# Patient Record
Sex: MALE | Race: WHITE | Employment: OTHER | URBAN - METROPOLITAN AREA
[De-identification: names, ages, dates, MRNs, and addresses within clinical notes are randomized per-mention and may not be internally consistent; named-entity substitution may affect disease eponyms.]

---

## 2018-10-10 ENCOUNTER — APPOINTMENT (OUTPATIENT)
Dept: GENERAL RADIOLOGY | Age: 58
End: 2018-10-10
Payer: COMMERCIAL

## 2018-10-10 ENCOUNTER — HOSPITAL ENCOUNTER (OUTPATIENT)
Age: 58
Setting detail: OBSERVATION
Discharge: HOME | End: 2018-10-11
Attending: EMERGENCY MEDICINE | Admitting: INTERNAL MEDICINE
Payer: COMMERCIAL

## 2018-10-10 DIAGNOSIS — R07.9 CHEST PAIN, UNSPECIFIED TYPE: Primary | ICD-10-CM

## 2018-10-10 DIAGNOSIS — I10 ESSENTIAL HYPERTENSION: ICD-10-CM

## 2018-10-10 DIAGNOSIS — E78.5 HYPERLIPIDEMIA, UNSPECIFIED HYPERLIPIDEMIA TYPE: ICD-10-CM

## 2018-10-10 LAB
ALBUMIN SERPL-MCNC: 3.8 G/DL (ref 3.5–5.2)
ALP BLD-CCNC: 73 U/L (ref 40–129)
ALT SERPL-CCNC: 33 U/L (ref 0–40)
ANION GAP SERPL CALCULATED.3IONS-SCNC: 9 MMOL/L (ref 7–16)
AST SERPL-CCNC: 26 U/L (ref 0–39)
BASOPHILS ABSOLUTE: 0.01 E9/L (ref 0–0.2)
BASOPHILS RELATIVE PERCENT: 0.2 % (ref 0–2)
BILIRUB SERPL-MCNC: 0.5 MG/DL (ref 0–1.2)
BUN BLDV-MCNC: 15 MG/DL (ref 8–23)
CALCIUM SERPL-MCNC: 8.6 MG/DL (ref 8.6–10.2)
CHLORIDE BLD-SCNC: 98 MMOL/L (ref 98–107)
CO2: 27 MMOL/L (ref 22–29)
CREAT SERPL-MCNC: 0.8 MG/DL (ref 0.7–1.2)
EOSINOPHILS ABSOLUTE: 0.2 E9/L (ref 0.05–0.5)
EOSINOPHILS RELATIVE PERCENT: 3.6 % (ref 0–6)
GFR AFRICAN AMERICAN: >60
GFR NON-AFRICAN AMERICAN: >60 ML/MIN/1.73
GLUCOSE BLD-MCNC: 115 MG/DL (ref 74–109)
HCT VFR BLD CALC: 36.8 % (ref 37–54)
HEMOGLOBIN: 12.4 G/DL (ref 12.5–16.5)
IMMATURE GRANULOCYTES #: 0.03 E9/L
IMMATURE GRANULOCYTES %: 0.5 % (ref 0–5)
LYMPHOCYTES ABSOLUTE: 1.17 E9/L (ref 1.5–4)
LYMPHOCYTES RELATIVE PERCENT: 21.3 % (ref 20–42)
MCH RBC QN AUTO: 29.5 PG (ref 26–35)
MCHC RBC AUTO-ENTMCNC: 33.7 % (ref 32–34.5)
MCV RBC AUTO: 87.4 FL (ref 80–99.9)
MONOCYTES ABSOLUTE: 0.61 E9/L (ref 0.1–0.95)
MONOCYTES RELATIVE PERCENT: 11.1 % (ref 2–12)
NEUTROPHILS ABSOLUTE: 3.48 E9/L (ref 1.8–7.3)
NEUTROPHILS RELATIVE PERCENT: 63.3 % (ref 43–80)
PDW BLD-RTO: 13.8 FL (ref 11.5–15)
PLATELET # BLD: 180 E9/L (ref 130–450)
PMV BLD AUTO: 10 FL (ref 7–12)
POTASSIUM SERPL-SCNC: 4.3 MMOL/L (ref 3.5–5)
PRO-BNP: 127 PG/ML (ref 0–125)
RBC # BLD: 4.21 E12/L (ref 3.8–5.8)
SODIUM BLD-SCNC: 134 MMOL/L (ref 132–146)
TOTAL PROTEIN: 7.1 G/DL (ref 6.4–8.3)
TROPONIN: <0.01 NG/ML (ref 0–0.03)
TROPONIN: <0.01 NG/ML (ref 0–0.03)
WBC # BLD: 5.5 E9/L (ref 4.5–11.5)

## 2018-10-10 PROCEDURE — 36415 COLL VENOUS BLD VENIPUNCTURE: CPT

## 2018-10-10 PROCEDURE — 71045 X-RAY EXAM CHEST 1 VIEW: CPT

## 2018-10-10 PROCEDURE — G0378 HOSPITAL OBSERVATION PER HR: HCPCS

## 2018-10-10 PROCEDURE — 99285 EMERGENCY DEPT VISIT HI MDM: CPT

## 2018-10-10 PROCEDURE — 85025 COMPLETE CBC W/AUTO DIFF WBC: CPT

## 2018-10-10 PROCEDURE — 83880 ASSAY OF NATRIURETIC PEPTIDE: CPT

## 2018-10-10 PROCEDURE — 6370000000 HC RX 637 (ALT 250 FOR IP): Performed by: EMERGENCY MEDICINE

## 2018-10-10 PROCEDURE — 6370000000 HC RX 637 (ALT 250 FOR IP): Performed by: INTERNAL MEDICINE

## 2018-10-10 PROCEDURE — 80053 COMPREHEN METABOLIC PANEL: CPT

## 2018-10-10 PROCEDURE — 93005 ELECTROCARDIOGRAM TRACING: CPT | Performed by: PHYSICIAN ASSISTANT

## 2018-10-10 PROCEDURE — 94664 DEMO&/EVAL PT USE INHALER: CPT

## 2018-10-10 PROCEDURE — 84484 ASSAY OF TROPONIN QUANT: CPT

## 2018-10-10 RX ORDER — ACETAMINOPHEN 325 MG/1
650 TABLET ORAL EVERY 4 HOURS PRN
Status: DISCONTINUED | OUTPATIENT
Start: 2018-10-10 | End: 2018-10-11 | Stop reason: HOSPADM

## 2018-10-10 RX ORDER — NITROGLYCERIN 0.4 MG/1
0.4 TABLET SUBLINGUAL EVERY 5 MIN PRN
Status: DISCONTINUED | OUTPATIENT
Start: 2018-10-10 | End: 2018-10-11 | Stop reason: HOSPADM

## 2018-10-10 RX ORDER — AMLODIPINE BESYLATE 5 MG/1
5 TABLET ORAL DAILY
Status: DISCONTINUED | OUTPATIENT
Start: 2018-10-11 | End: 2018-10-11 | Stop reason: HOSPADM

## 2018-10-10 RX ORDER — PRAVASTATIN SODIUM 20 MG
40 TABLET ORAL DAILY
Status: DISCONTINUED | OUTPATIENT
Start: 2018-10-11 | End: 2018-10-11 | Stop reason: HOSPADM

## 2018-10-10 RX ORDER — ONDANSETRON 2 MG/ML
4 INJECTION INTRAMUSCULAR; INTRAVENOUS EVERY 6 HOURS PRN
Status: DISCONTINUED | OUTPATIENT
Start: 2018-10-10 | End: 2018-10-11 | Stop reason: HOSPADM

## 2018-10-10 RX ORDER — ASPIRIN 81 MG/1
324 TABLET, CHEWABLE ORAL ONCE
Status: COMPLETED | OUTPATIENT
Start: 2018-10-10 | End: 2018-10-10

## 2018-10-10 RX ORDER — ASPIRIN 81 MG/1
81 TABLET, CHEWABLE ORAL DAILY
Status: DISCONTINUED | OUTPATIENT
Start: 2018-10-11 | End: 2018-10-11 | Stop reason: HOSPADM

## 2018-10-10 RX ORDER — IPRATROPIUM BROMIDE AND ALBUTEROL SULFATE 2.5; .5 MG/3ML; MG/3ML
1 SOLUTION RESPIRATORY (INHALATION) ONCE
Status: COMPLETED | OUTPATIENT
Start: 2018-10-10 | End: 2018-10-10

## 2018-10-10 RX ADMIN — IPRATROPIUM BROMIDE AND ALBUTEROL SULFATE 1 AMPULE: .5; 3 SOLUTION RESPIRATORY (INHALATION) at 17:59

## 2018-10-10 RX ADMIN — ASPIRIN 324 MG: 81 TABLET, CHEWABLE ORAL at 18:56

## 2018-10-10 RX ADMIN — ACETAMINOPHEN 650 MG: 325 TABLET ORAL at 23:10

## 2018-10-10 RX ADMIN — NITROGLYCERIN 0.4 MG: 0.4 TABLET SUBLINGUAL at 19:16

## 2018-10-10 RX ADMIN — NITROGLYCERIN 0.4 MG: 0.4 TABLET SUBLINGUAL at 18:58

## 2018-10-10 ASSESSMENT — ENCOUNTER SYMPTOMS
SHORTNESS OF BREATH: 1
EYE REDNESS: 0
VOMITING: 0
EYE DISCHARGE: 0
EYE PAIN: 0
BACK PAIN: 0
NAUSEA: 0
WHEEZING: 0
ABDOMINAL PAIN: 0
SORE THROAT: 0
COUGH: 0
SINUS PRESSURE: 0
DIARRHEA: 0

## 2018-10-10 ASSESSMENT — PAIN SCALES - GENERAL
PAINLEVEL_OUTOF10: 2
PAINLEVEL_OUTOF10: 2
PAINLEVEL_OUTOF10: 7
PAINLEVEL_OUTOF10: 0

## 2018-10-10 ASSESSMENT — PAIN DESCRIPTION - FREQUENCY: FREQUENCY: CONTINUOUS

## 2018-10-10 ASSESSMENT — PAIN DESCRIPTION - ORIENTATION
ORIENTATION: LEFT
ORIENTATION: LEFT

## 2018-10-10 ASSESSMENT — PAIN DESCRIPTION - LOCATION
LOCATION: CHEST
LOCATION: CHEST

## 2018-10-10 ASSESSMENT — PAIN DESCRIPTION - ONSET: ONSET: ON-GOING

## 2018-10-10 ASSESSMENT — PAIN DESCRIPTION - PROGRESSION: CLINICAL_PROGRESSION: NOT CHANGED

## 2018-10-10 ASSESSMENT — PAIN DESCRIPTION - DESCRIPTORS
DESCRIPTORS: HEAVINESS
DESCRIPTORS: HEAVINESS

## 2018-10-10 ASSESSMENT — PAIN DESCRIPTION - PAIN TYPE
TYPE: ACUTE PAIN
TYPE: ACUTE PAIN

## 2018-10-11 ENCOUNTER — APPOINTMENT (OUTPATIENT)
Dept: NUCLEAR MEDICINE | Age: 58
End: 2018-10-11
Payer: COMMERCIAL

## 2018-10-11 VITALS
BODY MASS INDEX: 31.6 KG/M2 | DIASTOLIC BLOOD PRESSURE: 67 MMHG | OXYGEN SATURATION: 93 % | WEIGHT: 196.6 LBS | RESPIRATION RATE: 18 BRPM | HEIGHT: 66 IN | SYSTOLIC BLOOD PRESSURE: 130 MMHG | HEART RATE: 76 BPM | TEMPERATURE: 98.3 F

## 2018-10-11 LAB
ALBUMIN SERPL-MCNC: 3.6 G/DL (ref 3.5–5.2)
ALP BLD-CCNC: 67 U/L (ref 40–129)
ALT SERPL-CCNC: 30 U/L (ref 0–40)
ANION GAP SERPL CALCULATED.3IONS-SCNC: 9 MMOL/L (ref 7–16)
AST SERPL-CCNC: 20 U/L (ref 0–39)
BASOPHILS ABSOLUTE: 0.02 E9/L (ref 0–0.2)
BASOPHILS RELATIVE PERCENT: 0.4 % (ref 0–2)
BILIRUB SERPL-MCNC: 0.4 MG/DL (ref 0–1.2)
BUN BLDV-MCNC: 15 MG/DL (ref 8–23)
CALCIUM SERPL-MCNC: 8.5 MG/DL (ref 8.6–10.2)
CHLORIDE BLD-SCNC: 100 MMOL/L (ref 98–107)
CHOLESTEROL, TOTAL: 145 MG/DL (ref 0–199)
CO2: 25 MMOL/L (ref 22–29)
CREAT SERPL-MCNC: 0.8 MG/DL (ref 0.7–1.2)
EOSINOPHILS ABSOLUTE: 0.21 E9/L (ref 0.05–0.5)
EOSINOPHILS RELATIVE PERCENT: 4.6 % (ref 0–6)
GFR AFRICAN AMERICAN: >60
GFR NON-AFRICAN AMERICAN: >60 ML/MIN/1.73
GLUCOSE BLD-MCNC: 120 MG/DL (ref 74–109)
HCT VFR BLD CALC: 36.2 % (ref 37–54)
HDLC SERPL-MCNC: 23 MG/DL
HEMOGLOBIN: 12.8 G/DL (ref 12.5–16.5)
IMMATURE GRANULOCYTES #: 0.01 E9/L
IMMATURE GRANULOCYTES %: 0.2 % (ref 0–5)
LDL CHOLESTEROL CALCULATED: 90 MG/DL (ref 0–99)
LV EF: 58 %
LVEF MODALITY: NORMAL
LYMPHOCYTES ABSOLUTE: 1.02 E9/L (ref 1.5–4)
LYMPHOCYTES RELATIVE PERCENT: 22.1 % (ref 20–42)
MCH RBC QN AUTO: 30.8 PG (ref 26–35)
MCHC RBC AUTO-ENTMCNC: 35.4 % (ref 32–34.5)
MCV RBC AUTO: 87.2 FL (ref 80–99.9)
MONOCYTES ABSOLUTE: 0.68 E9/L (ref 0.1–0.95)
MONOCYTES RELATIVE PERCENT: 14.8 % (ref 2–12)
NEUTROPHILS ABSOLUTE: 2.67 E9/L (ref 1.8–7.3)
NEUTROPHILS RELATIVE PERCENT: 57.9 % (ref 43–80)
PDW BLD-RTO: 13.7 FL (ref 11.5–15)
PLATELET # BLD: 170 E9/L (ref 130–450)
PMV BLD AUTO: 10.1 FL (ref 7–12)
POTASSIUM REFLEX MAGNESIUM: 4.1 MMOL/L (ref 3.5–5)
RBC # BLD: 4.15 E12/L (ref 3.8–5.8)
SODIUM BLD-SCNC: 134 MMOL/L (ref 132–146)
TOTAL PROTEIN: 6.4 G/DL (ref 6.4–8.3)
TRIGL SERPL-MCNC: 161 MG/DL (ref 0–149)
TROPONIN: <0.01 NG/ML (ref 0–0.03)
VLDLC SERPL CALC-MCNC: 32 MG/DL
WBC # BLD: 4.6 E9/L (ref 4.5–11.5)

## 2018-10-11 PROCEDURE — 6370000000 HC RX 637 (ALT 250 FOR IP): Performed by: INTERNAL MEDICINE

## 2018-10-11 PROCEDURE — 36415 COLL VENOUS BLD VENIPUNCTURE: CPT

## 2018-10-11 PROCEDURE — 93018 CV STRESS TEST I&R ONLY: CPT | Performed by: INTERNAL MEDICINE

## 2018-10-11 PROCEDURE — 84484 ASSAY OF TROPONIN QUANT: CPT

## 2018-10-11 PROCEDURE — 6360000002 HC RX W HCPCS: Performed by: INTERNAL MEDICINE

## 2018-10-11 PROCEDURE — 93016 CV STRESS TEST SUPVJ ONLY: CPT | Performed by: INTERNAL MEDICINE

## 2018-10-11 PROCEDURE — A9500 TC99M SESTAMIBI: HCPCS | Performed by: RADIOLOGY

## 2018-10-11 PROCEDURE — 80053 COMPREHEN METABOLIC PANEL: CPT

## 2018-10-11 PROCEDURE — G0378 HOSPITAL OBSERVATION PER HR: HCPCS

## 2018-10-11 PROCEDURE — 85025 COMPLETE CBC W/AUTO DIFF WBC: CPT

## 2018-10-11 PROCEDURE — 93017 CV STRESS TEST TRACING ONLY: CPT

## 2018-10-11 PROCEDURE — 80061 LIPID PANEL: CPT

## 2018-10-11 PROCEDURE — 3430000000 HC RX DIAGNOSTIC RADIOPHARMACEUTICAL: Performed by: RADIOLOGY

## 2018-10-11 PROCEDURE — 78452 HT MUSCLE IMAGE SPECT MULT: CPT

## 2018-10-11 RX ORDER — ALBUTEROL SULFATE 90 UG/1
2 AEROSOL, METERED RESPIRATORY (INHALATION) EVERY 6 HOURS PRN
Qty: 1 INHALER | Refills: 0 | Status: SHIPPED | OUTPATIENT
Start: 2018-10-11 | End: 2019-02-21 | Stop reason: ALTCHOICE

## 2018-10-11 RX ORDER — PREDNISONE 10 MG/1
TABLET ORAL
Qty: 30 TABLET | Refills: 0 | Status: SHIPPED | OUTPATIENT
Start: 2018-10-11 | End: 2018-10-21

## 2018-10-11 RX ADMIN — REGADENOSON 0.4 MG: 0.08 INJECTION, SOLUTION INTRAVENOUS at 11:12

## 2018-10-11 RX ADMIN — Medication 30 MILLICURIE: at 09:30

## 2018-10-11 RX ADMIN — Medication 10 MILLICURIE: at 09:30

## 2018-10-11 RX ADMIN — ASPIRIN 81 MG: 81 TABLET, CHEWABLE ORAL at 12:43

## 2018-10-11 RX ADMIN — AMLODIPINE BESYLATE 5 MG: 5 TABLET ORAL at 12:44

## 2018-10-11 RX ADMIN — PRAVASTATIN SODIUM 40 MG: 20 TABLET ORAL at 12:43

## 2018-10-11 ASSESSMENT — PAIN SCALES - GENERAL: PAINLEVEL_OUTOF10: 0

## 2018-10-11 NOTE — H&P
10/11/2018     10/11/2018    CREATININE 0.8 10/11/2018    BUN 15 10/11/2018    CO2 25 10/11/2018    GLUCOSE 120 (H) 10/11/2018    ALT 30 10/11/2018    AST 20 10/11/2018    INR 1.0 06/04/2012     Lab Results   Component Value Date    TROPONINI <0.01 10/11/2018       Recent Radiological Studies:  Stress/Rest NM Myocardial SPECT Exercise   Final Result   1. No reversible perfusion defect   2. Ejection fraction is 58 %. 3. No significant wall motion abnormality         XR CHEST PORTABLE   Final Result   Subtle prominence of the pulmonary vasculature for peribronchial   interstitial density, but no evidence of consolidative pneumonia,   dependent effusion, or cardiomegaly. Assessment  Active Hospital Problems    Diagnosis    Chest pain [R07.9]     Priority: High       Patient Active Problem List    Diagnosis Date Noted    Chest pain 10/10/2018     Priority: High    Hypertension     Hyperlipidemia        Plan  Chest pain likely related to pulmonary dz: Observation on telemetry floor, Exercise stress test, Cycle CE's, ASA, lipid panel. He would benefit from PFT's as an outpatient. Albuterol puffer and steroids on DC. · Continue home medications. · Follow labs  · DVT prophylaxis. · Please see orders for further management and care. ·  for discharge planning  · Discharge plan: Home today-- needs pulm f/u    Brittany Gardner NP  10/11/2018  12:29 PM    Addendum: I have personally participated in a face-to-face history and physical exam on the date of service with the patient. I have discussed the case with the NP. I also participated in medical decision making with the NPon the date of service and I agree with all of the pertinent clinical information unless indicated in my editing of the note. I have reviewed and edited the note above based on my findings during my history, exam, and decision making on the same day of service.      Electronically signed by Reji Avila DO on 10/11/2018

## 2018-10-11 NOTE — PROGRESS NOTES
P Quality Flow/Interdisciplinary Rounds Progress Note        Quality Flow Rounds held on October 11, 2018    Disciplines Attending:  Bedside Nurse, ,  and Nursing Unit Leadership    Boaz Mckeon was admitted on 10/10/2018  4:23 PM    Anticipated Discharge Date:  Expected Discharge Date: 10/11/18    Disposition:    Jaren Score:  Jaren Scale Score: 23    Readmission Risk              Risk of Unplanned Readmission:        9             Discussed patient goal for the day, patient clinical progression, and barriers to discharge.   The following Goal(s) of the Day/Commitment(s) have been identified:  Discharge if stress negative      Mayuri Nab  October 11, 2018

## 2018-10-12 LAB
EKG ATRIAL RATE: 81 BPM
EKG P AXIS: 32 DEGREES
EKG P-R INTERVAL: 162 MS
EKG Q-T INTERVAL: 370 MS
EKG QRS DURATION: 84 MS
EKG QTC CALCULATION (BAZETT): 429 MS
EKG R AXIS: -7 DEGREES
EKG T AXIS: 51 DEGREES
EKG VENTRICULAR RATE: 81 BPM

## 2018-11-03 LAB
CHOLESTEROL, TOTAL: 196 MG/DL
CHOLESTEROL/HDL RATIO: 4.8
HDLC SERPL-MCNC: 41 MG/DL (ref 35–70)
LDL CHOLESTEROL CALCULATED: 134 MG/DL (ref 0–160)
TRIGL SERPL-MCNC: 99 MG/DL
VLDLC SERPL CALC-MCNC: NORMAL MG/DL

## 2019-02-21 ENCOUNTER — HOSPITAL ENCOUNTER (EMERGENCY)
Age: 59
Discharge: HOME | End: 2019-02-21
Attending: FAMILY MEDICINE
Payer: COMMERCIAL

## 2019-02-21 VITALS
BODY MASS INDEX: 32.14 KG/M2 | HEART RATE: 84 BPM | HEIGHT: 66 IN | RESPIRATION RATE: 16 BRPM | DIASTOLIC BLOOD PRESSURE: 88 MMHG | WEIGHT: 200 LBS | OXYGEN SATURATION: 96 % | TEMPERATURE: 97.9 F | SYSTOLIC BLOOD PRESSURE: 144 MMHG

## 2019-02-21 DIAGNOSIS — T15.02XA FOREIGN BODY OF LEFT CORNEA, INITIAL ENCOUNTER: Primary | ICD-10-CM

## 2019-02-21 PROCEDURE — 6370000000 HC RX 637 (ALT 250 FOR IP)

## 2019-02-21 PROCEDURE — 99283 EMERGENCY DEPT VISIT LOW MDM: CPT

## 2019-02-21 RX ORDER — TETRACAINE HYDROCHLORIDE 5 MG/ML
SOLUTION OPHTHALMIC
Status: COMPLETED
Start: 2019-02-21 | End: 2019-02-21

## 2019-02-21 RX ADMIN — FLUORESCEIN SODIUM 1 MG: 1 STRIP OPHTHALMIC at 08:30

## 2019-02-21 RX ADMIN — TETRACAINE HYDROCHLORIDE 1 DROP: 5 SOLUTION OPHTHALMIC at 08:29

## 2019-02-21 ASSESSMENT — PAIN SCALES - GENERAL: PAINLEVEL_OUTOF10: 7

## 2019-02-21 ASSESSMENT — PAIN DESCRIPTION - LOCATION: LOCATION: EYE

## 2019-02-21 ASSESSMENT — PAIN DESCRIPTION - FREQUENCY: FREQUENCY: CONTINUOUS

## 2019-02-21 ASSESSMENT — PAIN DESCRIPTION - DESCRIPTORS: DESCRIPTORS: BURNING;SHARP

## 2019-02-21 ASSESSMENT — VISUAL ACUITY
OS: 20/50
OD: 20/20
OU: 20/20

## 2019-02-21 ASSESSMENT — PAIN DESCRIPTION - ORIENTATION: ORIENTATION: LEFT

## 2019-02-21 ASSESSMENT — PAIN - FUNCTIONAL ASSESSMENT: PAIN_FUNCTIONAL_ASSESSMENT: PREVENTS OR INTERFERES SOME ACTIVE ACTIVITIES AND ADLS

## 2019-02-21 ASSESSMENT — PAIN DESCRIPTION - PAIN TYPE: TYPE: ACUTE PAIN

## 2019-03-29 VITALS
BODY MASS INDEX: 35.32 KG/M2 | SYSTOLIC BLOOD PRESSURE: 142 MMHG | WEIGHT: 212 LBS | DIASTOLIC BLOOD PRESSURE: 82 MMHG | HEART RATE: 90 BPM | HEIGHT: 65 IN | TEMPERATURE: 98.4 F

## 2019-03-29 RX ORDER — SILDENAFIL CITRATE 20 MG/1
TABLET ORAL
COMMUNITY

## 2019-05-08 ENCOUNTER — OFFICE VISIT (OUTPATIENT)
Dept: PRIMARY CARE CLINIC | Age: 59
End: 2019-05-08
Payer: COMMERCIAL

## 2019-05-08 VITALS
HEART RATE: 85 BPM | OXYGEN SATURATION: 95 % | BODY MASS INDEX: 33.27 KG/M2 | TEMPERATURE: 97.7 F | WEIGHT: 207 LBS | DIASTOLIC BLOOD PRESSURE: 80 MMHG | SYSTOLIC BLOOD PRESSURE: 144 MMHG | HEIGHT: 66 IN

## 2019-05-08 DIAGNOSIS — R73.01 IMPAIRED FASTING GLUCOSE: ICD-10-CM

## 2019-05-08 DIAGNOSIS — I10 ESSENTIAL HYPERTENSION: Primary | ICD-10-CM

## 2019-05-08 DIAGNOSIS — F17.210 CIGARETTE NICOTINE DEPENDENCE WITHOUT COMPLICATION: ICD-10-CM

## 2019-05-08 DIAGNOSIS — E78.5 HYPERLIPIDEMIA, UNSPECIFIED HYPERLIPIDEMIA TYPE: ICD-10-CM

## 2019-05-08 DIAGNOSIS — L40.9 PSORIASIS: ICD-10-CM

## 2019-05-08 PROCEDURE — 99214 OFFICE O/P EST MOD 30 MIN: CPT | Performed by: FAMILY MEDICINE

## 2019-05-08 RX ORDER — AMLODIPINE BESYLATE 5 MG/1
5 TABLET ORAL DAILY
Qty: 90 TABLET | Refills: 3 | Status: SHIPPED | OUTPATIENT
Start: 2019-05-08 | End: 2019-11-20 | Stop reason: SDUPTHER

## 2019-05-08 RX ORDER — PRAVASTATIN SODIUM 40 MG
40 TABLET ORAL DAILY
Qty: 90 TABLET | Refills: 3 | Status: SHIPPED | OUTPATIENT
Start: 2019-05-08 | End: 2019-11-20 | Stop reason: SDUPTHER

## 2019-05-08 ASSESSMENT — ENCOUNTER SYMPTOMS
RESPIRATORY NEGATIVE: 1
EYES NEGATIVE: 1
ALLERGIC/IMMUNOLOGIC NEGATIVE: 1
GASTROINTESTINAL NEGATIVE: 1

## 2019-05-08 ASSESSMENT — PATIENT HEALTH QUESTIONNAIRE - PHQ9
1. LITTLE INTEREST OR PLEASURE IN DOING THINGS: 0
SUM OF ALL RESPONSES TO PHQ9 QUESTIONS 1 & 2: 0
2. FEELING DOWN, DEPRESSED OR HOPELESS: 0
SUM OF ALL RESPONSES TO PHQ QUESTIONS 1-9: 0
SUM OF ALL RESPONSES TO PHQ QUESTIONS 1-9: 0

## 2019-05-08 NOTE — PROGRESS NOTES
19     Mg Jaffe    : 1960 Sex: male   Age: 59 y.o. Chief Complaint   Patient presents with    Hypertension     No new concerns    Hyperlipidemia    Nicotine Dependence     He did not have CT scan done. HPI: Patient presents today and medical follow-up issues of hypertension hyperlipidemia nicotine dependence psoriasis. Medically doing well with current medications. Repeat pressure per myself 140/80 today. Psoriasis managed by Dr. Grimaldo Grandchild. Currently on Humira. Doing well. Scheduled for screening CT scan of the chest given nicotine history. Currently understands risks and unwilling to have tested this time. ROS:  Review of Systems   Constitutional: Negative. HENT: Negative. Eyes: Negative. Respiratory: Negative. Gastrointestinal: Negative. Endocrine: Negative. Genitourinary: Negative. Musculoskeletal: Negative. Skin: Negative. Allergic/Immunologic: Negative. Neurological: Negative. Hematological: Negative. Psychiatric/Behavioral: Negative.            Current Outpatient Medications:     sildenafil (REVATIO) 20 MG tablet, 2-3 tabs as needed, Disp: , Rfl:     pravastatin (PRAVACHOL) 40 MG tablet, Take 40 mg by mouth daily, Disp: , Rfl:     amLODIPine (NORVASC) 5 MG tablet, Take 5 mg by mouth daily Instructed to take am of procedure, Disp: , Rfl:     No Known Allergies    Social History     Tobacco Use    Smoking status: Heavy Tobacco Smoker     Packs/day: 1.50     Years: 30.00     Pack years: 45.00     Types: Cigarettes    Smokeless tobacco: Never Used   Substance Use Topics    Alcohol use: Yes     Comment: beer     Drug use: No      Past Surgical History:   Procedure Laterality Date    COLONOSCOPY  2016    JOINT REPLACEMENT Left     knee    KNEE ARTHROPLASTY       Family History   Problem Relation Age of Onset    Heart Disease Mother     Cancer Mother         Brain    Cancer Father         colon, Pancreatic    Heart Attack Father     Diabetes Father      Past Medical History:   Diagnosis Date    Abscess     Hyperlipidemia     Hypertension     Impaired fasting glucose     Psoriasis        Vitals:    05/08/19 1601 05/08/19 1607   BP: (!) 158/84 (!) 144/80   Pulse: 85    Temp: 97.7 °F (36.5 °C)    SpO2: 95%    Weight: 207 lb (93.9 kg)    Height: 5' 6\" (1.676 m)        Physical Exam   Constitutional: He is oriented to person, place, and time. He appears well-developed and well-nourished. HENT:   Head: Normocephalic. Right Ear: External ear normal.   Left Ear: External ear normal.   Nose: Nose normal.   Mouth/Throat: Oropharynx is clear and moist.   Eyes: Pupils are equal, round, and reactive to light. Conjunctivae and EOM are normal.   Neck: Normal range of motion. Neck supple. Cardiovascular: Normal rate and intact distal pulses. Pulmonary/Chest: Breath sounds normal.   Abdominal: Soft. Bowel sounds are normal.   Musculoskeletal: Normal range of motion. Neurological: He is alert and oriented to person, place, and time. Skin: Skin is warm and dry. Psychiatric: He has a normal mood and affect. His behavior is normal.   Nursing note and vitals reviewed. Medically all stable as noted. We'll continue with current medications. Recommended labs just prior to next visit including glycohemoglobin    Plan Per Assessment:  Shante Rodriguez was seen today for hypertension, hyperlipidemia and nicotine dependence. Diagnoses and all orders for this visit:    Essential hypertension    Hyperlipidemia, unspecified hyperlipidemia type    Psoriasis    Cigarette nicotine dependence without complication        No follow-ups on file. Avinash Najera DO    Note was generated with the assistance of voice recognition software. Document was reviewed however may contain grammatical errors.

## 2019-07-20 ENCOUNTER — OFFICE VISIT (OUTPATIENT)
Dept: FAMILY MEDICINE CLINIC | Age: 59
End: 2019-07-20
Payer: COMMERCIAL

## 2019-07-20 VITALS
WEIGHT: 201 LBS | HEIGHT: 65 IN | TEMPERATURE: 98 F | BODY MASS INDEX: 33.49 KG/M2 | OXYGEN SATURATION: 96 % | HEART RATE: 87 BPM | SYSTOLIC BLOOD PRESSURE: 138 MMHG | DIASTOLIC BLOOD PRESSURE: 80 MMHG

## 2019-07-20 DIAGNOSIS — L73.9 FOLLICULITIS OF BOTH AXILLAE: Primary | ICD-10-CM

## 2019-07-20 DIAGNOSIS — R73.01 IMPAIRED FASTING GLUCOSE: ICD-10-CM

## 2019-07-20 DIAGNOSIS — I10 ESSENTIAL HYPERTENSION: ICD-10-CM

## 2019-07-20 DIAGNOSIS — E78.5 HYPERLIPIDEMIA, UNSPECIFIED HYPERLIPIDEMIA TYPE: ICD-10-CM

## 2019-07-20 PROCEDURE — 99214 OFFICE O/P EST MOD 30 MIN: CPT | Performed by: FAMILY MEDICINE

## 2019-07-20 RX ORDER — SULFAMETHOXAZOLE AND TRIMETHOPRIM 800; 160 MG/1; MG/1
1 TABLET ORAL 2 TIMES DAILY
Qty: 30 TABLET | Refills: 0 | Status: SHIPPED | OUTPATIENT
Start: 2019-07-20 | End: 2019-08-04

## 2019-07-20 ASSESSMENT — ENCOUNTER SYMPTOMS
EYES NEGATIVE: 1
ALLERGIC/IMMUNOLOGIC NEGATIVE: 1
GASTROINTESTINAL NEGATIVE: 1
RESPIRATORY NEGATIVE: 1

## 2019-08-07 ENCOUNTER — HOSPITAL ENCOUNTER (OUTPATIENT)
Age: 59
Discharge: HOME | End: 2019-08-07
Payer: COMMERCIAL

## 2019-08-07 DIAGNOSIS — I10 ESSENTIAL HYPERTENSION: ICD-10-CM

## 2019-08-07 DIAGNOSIS — E78.5 HYPERLIPIDEMIA, UNSPECIFIED HYPERLIPIDEMIA TYPE: ICD-10-CM

## 2019-08-07 DIAGNOSIS — R73.01 IMPAIRED FASTING GLUCOSE: ICD-10-CM

## 2019-08-07 LAB
ALBUMIN SERPL-MCNC: 4.4 G/DL (ref 3.5–5.2)
ALP BLD-CCNC: 91 U/L (ref 40–129)
ALT SERPL-CCNC: 20 U/L (ref 0–40)
ANION GAP SERPL CALCULATED.3IONS-SCNC: 10 MMOL/L (ref 7–16)
AST SERPL-CCNC: 17 U/L (ref 0–39)
BILIRUB SERPL-MCNC: 0.4 MG/DL (ref 0–1.2)
BUN BLDV-MCNC: 14 MG/DL (ref 8–23)
CALCIUM SERPL-MCNC: 9.3 MG/DL (ref 8.6–10.2)
CHLORIDE BLD-SCNC: 102 MMOL/L (ref 98–107)
CHOLESTEROL, TOTAL: 166 MG/DL (ref 0–199)
CO2: 27 MMOL/L (ref 22–29)
CREAT SERPL-MCNC: 0.8 MG/DL (ref 0.7–1.2)
GFR AFRICAN AMERICAN: >60
GFR NON-AFRICAN AMERICAN: >60 ML/MIN/1.73
GLUCOSE BLD-MCNC: 131 MG/DL (ref 74–99)
HBA1C MFR BLD: 6.1 % (ref 4–5.6)
HDLC SERPL-MCNC: 27 MG/DL
LDL CHOLESTEROL CALCULATED: 118 MG/DL (ref 0–99)
POTASSIUM SERPL-SCNC: 4.1 MMOL/L (ref 3.5–5)
SODIUM BLD-SCNC: 139 MMOL/L (ref 132–146)
TOTAL PROTEIN: 7.7 G/DL (ref 6.4–8.3)
TRIGL SERPL-MCNC: 104 MG/DL (ref 0–149)
VLDLC SERPL CALC-MCNC: 21 MG/DL

## 2019-08-07 PROCEDURE — 83036 HEMOGLOBIN GLYCOSYLATED A1C: CPT

## 2019-08-07 PROCEDURE — 80053 COMPREHEN METABOLIC PANEL: CPT

## 2019-08-07 PROCEDURE — 36415 COLL VENOUS BLD VENIPUNCTURE: CPT

## 2019-08-07 PROCEDURE — 80061 LIPID PANEL: CPT

## 2019-08-15 ENCOUNTER — OFFICE VISIT (OUTPATIENT)
Dept: PRIMARY CARE CLINIC | Age: 59
End: 2019-08-15
Payer: COMMERCIAL

## 2019-08-15 VITALS
TEMPERATURE: 99 F | WEIGHT: 195 LBS | BODY MASS INDEX: 32.45 KG/M2 | SYSTOLIC BLOOD PRESSURE: 142 MMHG | DIASTOLIC BLOOD PRESSURE: 70 MMHG

## 2019-08-15 DIAGNOSIS — I10 ESSENTIAL HYPERTENSION: Primary | ICD-10-CM

## 2019-08-15 DIAGNOSIS — L08.9 SKIN INFECTION: ICD-10-CM

## 2019-08-15 DIAGNOSIS — R73.01 IMPAIRED FASTING GLUCOSE: ICD-10-CM

## 2019-08-15 DIAGNOSIS — L40.9 PSORIASIS: ICD-10-CM

## 2019-08-15 DIAGNOSIS — E78.5 HYPERLIPIDEMIA, UNSPECIFIED HYPERLIPIDEMIA TYPE: ICD-10-CM

## 2019-08-15 PROCEDURE — 99214 OFFICE O/P EST MOD 30 MIN: CPT | Performed by: FAMILY MEDICINE

## 2019-08-15 RX ORDER — METFORMIN HYDROCHLORIDE 500 MG/1
500 TABLET, EXTENDED RELEASE ORAL
Qty: 30 TABLET | Refills: 3 | Status: SHIPPED | OUTPATIENT
Start: 2019-08-15 | End: 2019-11-20 | Stop reason: SDUPTHER

## 2019-08-15 RX ORDER — DOXYCYCLINE HYCLATE 100 MG/1
100 CAPSULE ORAL 2 TIMES DAILY
Qty: 28 CAPSULE | Refills: 0 | Status: SHIPPED | OUTPATIENT
Start: 2019-08-15 | End: 2019-08-29

## 2019-08-15 ASSESSMENT — ENCOUNTER SYMPTOMS
EYES NEGATIVE: 1
ALLERGIC/IMMUNOLOGIC NEGATIVE: 1
RESPIRATORY NEGATIVE: 1
GASTROINTESTINAL NEGATIVE: 1

## 2019-11-14 ENCOUNTER — TELEPHONE (OUTPATIENT)
Dept: PRIMARY CARE CLINIC | Age: 59
End: 2019-11-14

## 2019-11-20 ENCOUNTER — OFFICE VISIT (OUTPATIENT)
Dept: PRIMARY CARE CLINIC | Age: 59
End: 2019-11-20

## 2019-11-20 VITALS
WEIGHT: 196 LBS | OXYGEN SATURATION: 98 % | TEMPERATURE: 97.6 F | HEART RATE: 84 BPM | BODY MASS INDEX: 32.62 KG/M2 | DIASTOLIC BLOOD PRESSURE: 80 MMHG | SYSTOLIC BLOOD PRESSURE: 158 MMHG

## 2019-11-20 DIAGNOSIS — I10 ESSENTIAL HYPERTENSION: Primary | ICD-10-CM

## 2019-11-20 DIAGNOSIS — E78.5 HYPERLIPIDEMIA, UNSPECIFIED HYPERLIPIDEMIA TYPE: ICD-10-CM

## 2019-11-20 DIAGNOSIS — L40.9 PSORIASIS: ICD-10-CM

## 2019-11-20 DIAGNOSIS — F17.210 CIGARETTE NICOTINE DEPENDENCE WITHOUT COMPLICATION: ICD-10-CM

## 2019-11-20 DIAGNOSIS — R73.01 IMPAIRED FASTING GLUCOSE: ICD-10-CM

## 2019-11-20 PROCEDURE — 99214 OFFICE O/P EST MOD 30 MIN: CPT | Performed by: FAMILY MEDICINE

## 2019-11-20 RX ORDER — AMLODIPINE BESYLATE 5 MG/1
5 TABLET ORAL DAILY
Qty: 90 TABLET | Refills: 3 | Status: SHIPPED
Start: 2019-11-20 | End: 2020-12-21 | Stop reason: SDUPTHER

## 2019-11-20 RX ORDER — HYDROCHLOROTHIAZIDE 25 MG/1
25 TABLET ORAL EVERY MORNING
Qty: 30 TABLET | Refills: 2 | Status: SHIPPED
Start: 2019-11-20 | End: 2020-03-23

## 2019-11-20 RX ORDER — METFORMIN HYDROCHLORIDE 500 MG/1
500 TABLET, EXTENDED RELEASE ORAL
Qty: 90 TABLET | Refills: 3 | Status: SHIPPED
Start: 2019-11-20 | End: 2020-12-04

## 2019-11-20 RX ORDER — PRAVASTATIN SODIUM 40 MG
40 TABLET ORAL DAILY
Qty: 90 TABLET | Refills: 3 | Status: SHIPPED
Start: 2019-11-20 | End: 2020-12-21 | Stop reason: SDUPTHER

## 2019-11-20 ASSESSMENT — ENCOUNTER SYMPTOMS
ALLERGIC/IMMUNOLOGIC NEGATIVE: 1
EYES NEGATIVE: 1
RESPIRATORY NEGATIVE: 1
GASTROINTESTINAL NEGATIVE: 1

## 2020-01-19 LAB
ALBUMIN SERPL-MCNC: 4.4 G/DL
ALP BLD-CCNC: 84 U/L
ALT SERPL-CCNC: 14 U/L
ANION GAP SERPL CALCULATED.3IONS-SCNC: NORMAL MMOL/L
AST SERPL-CCNC: 13 U/L
AVERAGE GLUCOSE: NORMAL
BILIRUB SERPL-MCNC: 0.6 MG/DL (ref 0.1–1.4)
BUN BLDV-MCNC: 14 MG/DL
CALCIUM SERPL-MCNC: 9.4 MG/DL
CHLORIDE BLD-SCNC: 104 MMOL/L
CHOLESTEROL, TOTAL: 176 MG/DL
CHOLESTEROL/HDL RATIO: 4.9
CO2: 31 MMOL/L
CREAT SERPL-MCNC: 0.82 MG/DL
GFR CALCULATED: 95
GLUCOSE BLD-MCNC: 121 MG/DL
HBA1C MFR BLD: 6.1 %
HDLC SERPL-MCNC: 36 MG/DL (ref 35–70)
LDL CHOLESTEROL CALCULATED: 122 MG/DL (ref 0–160)
POTASSIUM SERPL-SCNC: 4.2 MMOL/L
SODIUM BLD-SCNC: 141 MMOL/L
TOTAL PROTEIN: 6.8
TRIGL SERPL-MCNC: 79 MG/DL
VLDLC SERPL CALC-MCNC: NORMAL MG/DL

## 2020-02-15 ENCOUNTER — OFFICE VISIT (OUTPATIENT)
Dept: FAMILY MEDICINE CLINIC | Age: 60
End: 2020-02-15
Payer: MEDICAID

## 2020-02-15 VITALS
TEMPERATURE: 97.8 F | SYSTOLIC BLOOD PRESSURE: 146 MMHG | DIASTOLIC BLOOD PRESSURE: 78 MMHG | OXYGEN SATURATION: 97 % | WEIGHT: 202 LBS | RESPIRATION RATE: 18 BRPM | BODY MASS INDEX: 33.61 KG/M2 | HEART RATE: 83 BPM

## 2020-02-15 PROCEDURE — 4004F PT TOBACCO SCREEN RCVD TLK: CPT | Performed by: NURSE PRACTITIONER

## 2020-02-15 PROCEDURE — 3017F COLORECTAL CA SCREEN DOC REV: CPT | Performed by: NURSE PRACTITIONER

## 2020-02-15 PROCEDURE — G8427 DOCREV CUR MEDS BY ELIG CLIN: HCPCS | Performed by: NURSE PRACTITIONER

## 2020-02-15 PROCEDURE — G8417 CALC BMI ABV UP PARAM F/U: HCPCS | Performed by: NURSE PRACTITIONER

## 2020-02-15 PROCEDURE — G8484 FLU IMMUNIZE NO ADMIN: HCPCS | Performed by: NURSE PRACTITIONER

## 2020-02-15 PROCEDURE — 99213 OFFICE O/P EST LOW 20 MIN: CPT | Performed by: NURSE PRACTITIONER

## 2020-02-15 RX ORDER — AZITHROMYCIN 250 MG/1
250 TABLET, FILM COATED ORAL SEE ADMIN INSTRUCTIONS
Qty: 6 TABLET | Refills: 0 | Status: SHIPPED | OUTPATIENT
Start: 2020-02-18 | End: 2020-02-20 | Stop reason: CLARIF

## 2020-02-15 NOTE — PROGRESS NOTES
Chief Complaint:   URI; Cough (non productive); and Sinus Problem (stuffy)      History of Present Illness   Source of history provided by:  patient. Wilman Diaz is a 62 y.o. old male with a past medical history of:   Past Medical History:   Diagnosis Date    Abscess     Hyperlipidemia     Hypertension     Impaired fasting glucose     Psoriasis     Presents to express care for evaluation of sinus pressure, nasal congestion, discolored nasal drainage, bilateral ear pressure, mild nonproductive cough and sore throat x 2 days. Has been taking nyquil and dayquil OTC without relief. Denies any fever, chills, wheezing, CP, SOB, or GI symptoms. Denies any hx of asthma or COPD. He does admit to smoking cigarettes approximately 1 ppd. Review of Systems   Unless otherwise stated in this report or unable to obtain because of the patient's clinical or mental status as evidenced by the medical record, this patients's positive and negative responses for Review of Systems, constitutional, psych, eyes, ENT, cardiovascular, respiratory, gastrointestinal, neurological, genitourinary, musculoskeletal, integument systems and systems related to the presenting problem are either stated in the preceding or were negative for the symptoms and/or complaints related to the medical problem. Past Surgical History:  has a past surgical history that includes joint replacement (Left); Colonoscopy (12/05/2016); and Knee Arthroplasty. Social History:  reports that he has been smoking cigarettes. He has a 45.00 pack-year smoking history. He has never used smokeless tobacco. He reports current alcohol use. He reports that he does not use drugs. Family History: family history includes Cancer in his father and mother; Diabetes in his father; Heart Attack in his father; Heart Disease in his mother. Allergies: Patient has no known allergies.     Physical Exam   Vital Signs:  BP (!) 146/78   Pulse 83   Temp 97.8 °F (36.6

## 2020-02-20 ENCOUNTER — OFFICE VISIT (OUTPATIENT)
Dept: PRIMARY CARE CLINIC | Age: 60
End: 2020-02-20
Payer: MEDICAID

## 2020-02-20 VITALS
WEIGHT: 204 LBS | TEMPERATURE: 98.5 F | BODY MASS INDEX: 33.95 KG/M2 | SYSTOLIC BLOOD PRESSURE: 152 MMHG | DIASTOLIC BLOOD PRESSURE: 82 MMHG | OXYGEN SATURATION: 97 % | HEART RATE: 89 BPM

## 2020-02-20 PROCEDURE — G8427 DOCREV CUR MEDS BY ELIG CLIN: HCPCS | Performed by: FAMILY MEDICINE

## 2020-02-20 PROCEDURE — 4004F PT TOBACCO SCREEN RCVD TLK: CPT | Performed by: FAMILY MEDICINE

## 2020-02-20 PROCEDURE — 3017F COLORECTAL CA SCREEN DOC REV: CPT | Performed by: FAMILY MEDICINE

## 2020-02-20 PROCEDURE — G8417 CALC BMI ABV UP PARAM F/U: HCPCS | Performed by: FAMILY MEDICINE

## 2020-02-20 PROCEDURE — G8484 FLU IMMUNIZE NO ADMIN: HCPCS | Performed by: FAMILY MEDICINE

## 2020-02-20 PROCEDURE — 99214 OFFICE O/P EST MOD 30 MIN: CPT | Performed by: FAMILY MEDICINE

## 2020-02-20 ASSESSMENT — PATIENT HEALTH QUESTIONNAIRE - PHQ9
1. LITTLE INTEREST OR PLEASURE IN DOING THINGS: 0
SUM OF ALL RESPONSES TO PHQ QUESTIONS 1-9: 0
SUM OF ALL RESPONSES TO PHQ9 QUESTIONS 1 & 2: 0
SUM OF ALL RESPONSES TO PHQ QUESTIONS 1-9: 0
2. FEELING DOWN, DEPRESSED OR HOPELESS: 0

## 2020-02-20 ASSESSMENT — ENCOUNTER SYMPTOMS
COUGH: 1
SINUS PRESSURE: 1
EYES NEGATIVE: 1
ALLERGIC/IMMUNOLOGIC NEGATIVE: 1
SINUS PAIN: 1
GASTROINTESTINAL NEGATIVE: 1

## 2020-03-23 RX ORDER — HYDROCHLOROTHIAZIDE 25 MG/1
25 TABLET ORAL EVERY MORNING
Qty: 30 TABLET | Refills: 2 | Status: SHIPPED
Start: 2020-03-23 | End: 2020-05-20 | Stop reason: SDUPTHER

## 2020-05-20 ENCOUNTER — OFFICE VISIT (OUTPATIENT)
Dept: PRIMARY CARE CLINIC | Age: 60
End: 2020-05-20
Payer: MEDICAID

## 2020-05-20 ENCOUNTER — HOSPITAL ENCOUNTER (OUTPATIENT)
Age: 60
Discharge: HOME | End: 2020-05-22
Payer: COMMERCIAL

## 2020-05-20 VITALS
HEART RATE: 88 BPM | TEMPERATURE: 98.3 F | DIASTOLIC BLOOD PRESSURE: 78 MMHG | SYSTOLIC BLOOD PRESSURE: 140 MMHG | OXYGEN SATURATION: 98 % | BODY MASS INDEX: 33.12 KG/M2 | WEIGHT: 199 LBS

## 2020-05-20 PROBLEM — M79.672 FOOT PAIN, BILATERAL: Status: ACTIVE | Noted: 2020-05-20

## 2020-05-20 PROBLEM — M25.562 CHRONIC PAIN OF LEFT KNEE: Status: ACTIVE | Noted: 2020-05-20

## 2020-05-20 PROBLEM — G89.29 CHRONIC PAIN OF LEFT KNEE: Status: ACTIVE | Noted: 2020-05-20

## 2020-05-20 PROBLEM — M79.671 FOOT PAIN, BILATERAL: Status: ACTIVE | Noted: 2020-05-20

## 2020-05-20 LAB
ALBUMIN SERPL-MCNC: 4.6 G/DL (ref 3.5–5.2)
ALP BLD-CCNC: 78 U/L (ref 40–129)
ALT SERPL-CCNC: 16 U/L (ref 0–40)
ANION GAP SERPL CALCULATED.3IONS-SCNC: 14 MMOL/L (ref 7–16)
AST SERPL-CCNC: 17 U/L (ref 0–39)
BILIRUB SERPL-MCNC: 0.5 MG/DL (ref 0–1.2)
BUN BLDV-MCNC: 14 MG/DL (ref 8–23)
CALCIUM SERPL-MCNC: 9.7 MG/DL (ref 8.6–10.2)
CHLORIDE BLD-SCNC: 100 MMOL/L (ref 98–107)
CHOLESTEROL, TOTAL: 173 MG/DL (ref 0–199)
CO2: 31 MMOL/L (ref 22–29)
CREAT SERPL-MCNC: 1 MG/DL (ref 0.7–1.2)
GFR AFRICAN AMERICAN: >60
GFR NON-AFRICAN AMERICAN: >60 ML/MIN/1.73
GLUCOSE BLD-MCNC: 131 MG/DL (ref 74–99)
HBA1C MFR BLD: 6.4 % (ref 4–5.6)
HDLC SERPL-MCNC: 34 MG/DL
LDL CHOLESTEROL CALCULATED: 117 MG/DL (ref 0–99)
POTASSIUM SERPL-SCNC: 4.6 MMOL/L (ref 3.5–5)
SODIUM BLD-SCNC: 145 MMOL/L (ref 132–146)
TOTAL PROTEIN: 7.6 G/DL (ref 6.4–8.3)
TRIGL SERPL-MCNC: 111 MG/DL (ref 0–149)
VLDLC SERPL CALC-MCNC: 22 MG/DL

## 2020-05-20 PROCEDURE — G8427 DOCREV CUR MEDS BY ELIG CLIN: HCPCS | Performed by: FAMILY MEDICINE

## 2020-05-20 PROCEDURE — 4004F PT TOBACCO SCREEN RCVD TLK: CPT | Performed by: FAMILY MEDICINE

## 2020-05-20 PROCEDURE — 99214 OFFICE O/P EST MOD 30 MIN: CPT | Performed by: FAMILY MEDICINE

## 2020-05-20 PROCEDURE — 80061 LIPID PANEL: CPT

## 2020-05-20 PROCEDURE — 83036 HEMOGLOBIN GLYCOSYLATED A1C: CPT

## 2020-05-20 PROCEDURE — 80053 COMPREHEN METABOLIC PANEL: CPT

## 2020-05-20 PROCEDURE — 3017F COLORECTAL CA SCREEN DOC REV: CPT | Performed by: FAMILY MEDICINE

## 2020-05-20 PROCEDURE — G8417 CALC BMI ABV UP PARAM F/U: HCPCS | Performed by: FAMILY MEDICINE

## 2020-05-20 PROCEDURE — 36415 COLL VENOUS BLD VENIPUNCTURE: CPT

## 2020-05-20 RX ORDER — HYDROCHLOROTHIAZIDE 25 MG/1
25 TABLET ORAL EVERY MORNING
Qty: 90 TABLET | Refills: 3 | Status: SHIPPED
Start: 2020-05-20 | End: 2021-03-04 | Stop reason: SDUPTHER

## 2020-05-20 ASSESSMENT — ENCOUNTER SYMPTOMS
EYES NEGATIVE: 1
GASTROINTESTINAL NEGATIVE: 1
ALLERGIC/IMMUNOLOGIC NEGATIVE: 1
RESPIRATORY NEGATIVE: 1

## 2020-05-20 NOTE — PROGRESS NOTES
MG tablet, Take 1 tablet by mouth daily, Disp: 90 tablet, Rfl: 3    amLODIPine (NORVASC) 5 MG tablet, Take 1 tablet by mouth daily 1 tablet daily, Disp: 90 tablet, Rfl: 3    metFORMIN (GLUCOPHAGE XR) 500 MG extended release tablet, Take 1 tablet by mouth daily (with breakfast), Disp: 90 tablet, Rfl: 3    adalimumab (HUMIRA PEN) 40 MG/0.8ML injection, Inject into the skin, Disp: , Rfl:     sildenafil (REVATIO) 20 MG tablet, 2-3 tabs as needed, Disp: , Rfl:     No Known Allergies    Social History     Tobacco Use    Smoking status: Heavy Tobacco Smoker     Packs/day: 1.50     Years: 30.00     Pack years: 45.00     Types: Cigarettes    Smokeless tobacco: Never Used   Substance Use Topics    Alcohol use: Yes     Comment: beer     Drug use: No      Past Surgical History:   Procedure Laterality Date    COLONOSCOPY  12/05/2016    JOINT REPLACEMENT Left     knee    KNEE ARTHROPLASTY       Family History   Problem Relation Age of Onset    Heart Disease Mother     Cancer Mother         Brain    Cancer Father         colon, Pancreatic    Heart Attack Father     Diabetes Father      Past Medical History:   Diagnosis Date    Abscess     Hyperlipidemia     Hypertension     Impaired fasting glucose     Psoriasis        Vitals:    05/20/20 1547   BP: (!) 140/78   Pulse: 88   Temp: 98.3 °F (36.8 °C)   SpO2: 98%   Weight: 199 lb (90.3 kg)     BP Readings from Last 3 Encounters:   05/20/20 (!) 140/78   02/20/20 (!) 152/82   02/15/20 (!) 146/78    138/78    Physical Exam  Vitals signs and nursing note reviewed. Constitutional:       Appearance: He is well-developed. HENT:      Head: Normocephalic. Right Ear: External ear normal.      Left Ear: External ear normal.      Nose: Nose normal.   Eyes:      Conjunctiva/sclera: Conjunctivae normal.      Pupils: Pupils are equal, round, and reactive to light. Neck:      Musculoskeletal: Normal range of motion and neck supple.    Cardiovascular:      Rate and LABA1C 6.1 01/18/2020    LABA1C 6.1 (H) 08/07/2019     No results found for: EAG       Plan Per Assessment:  Joshua Limon was seen today for hypertension and discuss labs. Diagnoses and all orders for this visit:    Essential hypertension  -     Comprehensive Metabolic Panel; Future  -     Lipid Panel; Future  -     Hemoglobin A1C; Future    Impaired fasting glucose  -     Comprehensive Metabolic Panel; Future  -     Lipid Panel; Future  -     Hemoglobin A1C; Future    Hyperlipidemia, unspecified hyperlipidemia type  -     Comprehensive Metabolic Panel; Future  -     Lipid Panel; Future  -     Hemoglobin A1C; Future    Cigarette nicotine dependence without complication    Chronic pain of left knee    Foot pain, bilateral    Other orders  -     hydroCHLOROthiazide (HYDRODIURIL) 25 MG tablet; Take 1 tablet by mouth every morning            Return in about 3 months (around 8/20/2020). Preethi Mcmanus, DO    Note was generated with the assistance of voice recognition software. Document was reviewed however may contain grammatical errors.

## 2020-07-13 ENCOUNTER — HOSPITAL ENCOUNTER (OUTPATIENT)
Age: 60
Discharge: HOME | End: 2020-07-15
Payer: MEDICAID

## 2020-07-13 ENCOUNTER — OFFICE VISIT (OUTPATIENT)
Dept: FAMILY MEDICINE CLINIC | Age: 60
End: 2020-07-13
Payer: MEDICAID

## 2020-07-13 VITALS
HEART RATE: 85 BPM | WEIGHT: 194 LBS | OXYGEN SATURATION: 95 % | TEMPERATURE: 97.9 F | BODY MASS INDEX: 32.28 KG/M2 | DIASTOLIC BLOOD PRESSURE: 82 MMHG | SYSTOLIC BLOOD PRESSURE: 142 MMHG

## 2020-07-13 PROBLEM — L02.411 ABSCESS OF AXILLA, RIGHT: Status: ACTIVE | Noted: 2020-07-13

## 2020-07-13 PROBLEM — L30.9 DERMATITIS: Status: ACTIVE | Noted: 2019-07-20

## 2020-07-13 PROCEDURE — G8417 CALC BMI ABV UP PARAM F/U: HCPCS | Performed by: FAMILY MEDICINE

## 2020-07-13 PROCEDURE — 3017F COLORECTAL CA SCREEN DOC REV: CPT | Performed by: FAMILY MEDICINE

## 2020-07-13 PROCEDURE — G8427 DOCREV CUR MEDS BY ELIG CLIN: HCPCS | Performed by: FAMILY MEDICINE

## 2020-07-13 PROCEDURE — 10060 I&D ABSCESS SIMPLE/SINGLE: CPT | Performed by: FAMILY MEDICINE

## 2020-07-13 PROCEDURE — 87081 CULTURE SCREEN ONLY: CPT

## 2020-07-13 PROCEDURE — 99214 OFFICE O/P EST MOD 30 MIN: CPT | Performed by: FAMILY MEDICINE

## 2020-07-13 PROCEDURE — 4004F PT TOBACCO SCREEN RCVD TLK: CPT | Performed by: FAMILY MEDICINE

## 2020-07-13 RX ORDER — SULFAMETHOXAZOLE AND TRIMETHOPRIM 800; 160 MG/1; MG/1
1 TABLET ORAL 2 TIMES DAILY
Qty: 28 TABLET | Refills: 0 | Status: SHIPPED | OUTPATIENT
Start: 2020-07-13 | End: 2020-07-27

## 2020-07-13 RX ORDER — DESOXIMETASONE 2.5 MG/G
CREAM TOPICAL
Qty: 30 G | Refills: 1 | Status: SHIPPED | OUTPATIENT
Start: 2020-07-13

## 2020-07-13 RX ORDER — PREDNISONE 10 MG/1
TABLET ORAL
Qty: 30 TABLET | Refills: 0 | Status: SHIPPED
Start: 2020-07-13 | End: 2020-08-05

## 2020-07-13 ASSESSMENT — ENCOUNTER SYMPTOMS
GASTROINTESTINAL NEGATIVE: 1
ALLERGIC/IMMUNOLOGIC NEGATIVE: 1
RESPIRATORY NEGATIVE: 1
EYES NEGATIVE: 1

## 2020-07-13 NOTE — PROGRESS NOTES
20     Mg Jaffe    : 1960 Sex: male   Age: 62 y.o. Chief Complaint   Patient presents with    Rash     using topical cream       Prior to Admission medications    Medication Sig Start Date End Date Taking? Authorizing Provider   predniSONE (DELTASONE) 10 MG tablet 3 x 5 days then 2 x  5 days then 1 x 5days 20  Yes Kathy Rader DO   sulfamethoxazole-trimethoprim (BACTRIM DS;SEPTRA DS) 800-160 MG per tablet Take 1 tablet by mouth 2 times daily for 14 days 20 Yes Ravinder Rader,    desoximetasone (TOPICORT) 0.25 % cream Apply topically 2 times daily. 20  Yes Kathy Rader DO   hydroCHLOROthiazide (HYDRODIURIL) 25 MG tablet Take 1 tablet by mouth every morning 20   Eron Martinez, DO   pravastatin (PRAVACHOL) 40 MG tablet Take 1 tablet by mouth daily 19   Kathy Rader DO   amLODIPine (NORVASC) 5 MG tablet Take 1 tablet by mouth daily 1 tablet daily 19   Kathy Rader DO   metFORMIN (GLUCOPHAGE XR) 500 MG extended release tablet Take 1 tablet by mouth daily (with breakfast) 19   Kathy Rader DO   adalimumab (HUMIRA PEN) 40 MG/0.8ML injection Inject into the skin    Historical Provider, MD   sildenafil (REVATIO) 20 MG tablet 2-3 tabs as needed    Historical Provider, MD          HPI: Patient evaluated today for rash involving the extremities primarily. Hands appear to be dyshidrosis eczema. Additional problems of psoriasis and follows with dermatology. Currently on Humira. Additional problems today axillary abscess. Incision and drainage completed and antibiotic therapy provided. Review of Systems   Constitutional: Negative. HENT: Negative. Eyes: Negative. Respiratory: Negative. Gastrointestinal: Negative. Endocrine: Negative. Genitourinary: Negative. Musculoskeletal: Negative. Skin: Negative. Allergic/Immunologic: Negative. Neurological: Negative.     Hematological: Negative. Psychiatric/Behavioral: Negative. Systems review overall stable aside from right axillary abscess and dermatitis as noted. Current Outpatient Medications:     predniSONE (DELTASONE) 10 MG tablet, 3 x 5 days then 2 x  5 days then 1 x 5days, Disp: 30 tablet, Rfl: 0    sulfamethoxazole-trimethoprim (BACTRIM DS;SEPTRA DS) 800-160 MG per tablet, Take 1 tablet by mouth 2 times daily for 14 days, Disp: 28 tablet, Rfl: 0    desoximetasone (TOPICORT) 0.25 % cream, Apply topically 2 times daily. , Disp: 30 g, Rfl: 1    hydroCHLOROthiazide (HYDRODIURIL) 25 MG tablet, Take 1 tablet by mouth every morning, Disp: 90 tablet, Rfl: 3    pravastatin (PRAVACHOL) 40 MG tablet, Take 1 tablet by mouth daily, Disp: 90 tablet, Rfl: 3    amLODIPine (NORVASC) 5 MG tablet, Take 1 tablet by mouth daily 1 tablet daily, Disp: 90 tablet, Rfl: 3    metFORMIN (GLUCOPHAGE XR) 500 MG extended release tablet, Take 1 tablet by mouth daily (with breakfast), Disp: 90 tablet, Rfl: 3    adalimumab (HUMIRA PEN) 40 MG/0.8ML injection, Inject into the skin, Disp: , Rfl:     sildenafil (REVATIO) 20 MG tablet, 2-3 tabs as needed, Disp: , Rfl:     No Known Allergies    Social History     Tobacco Use    Smoking status: Heavy Tobacco Smoker     Packs/day: 1.50     Years: 30.00     Pack years: 45.00     Types: Cigarettes    Smokeless tobacco: Never Used   Substance Use Topics    Alcohol use: Yes     Comment: beer     Drug use: No      Past Surgical History:   Procedure Laterality Date    COLONOSCOPY  12/05/2016    JOINT REPLACEMENT Left     knee    KNEE ARTHROPLASTY       Family History   Problem Relation Age of Onset    Heart Disease Mother     Cancer Mother         Brain    Cancer Father         colon, Pancreatic    Heart Attack Father     Diabetes Father      Past Medical History:   Diagnosis Date    Abscess     Hyperlipidemia     Hypertension     Impaired fasting glucose     Psoriasis        Vitals:    07/13/20 0803   BP: (!) 142/82   Pulse: 85   Temp: 97.9 °F (36.6 °C)   SpO2: 95%   Weight: 194 lb (88 kg)     BP Readings from Last 3 Encounters:   07/13/20 (!) 142/82   05/20/20 (!) 140/78   02/20/20 (!) 152/82        Physical Exam  Vitals signs and nursing note reviewed. Constitutional:       Appearance: He is well-developed. HENT:      Head: Normocephalic. Right Ear: External ear normal.      Left Ear: External ear normal.      Nose: Nose normal.   Eyes:      Conjunctiva/sclera: Conjunctivae normal.      Pupils: Pupils are equal, round, and reactive to light. Neck:      Musculoskeletal: Normal range of motion and neck supple. Cardiovascular:      Rate and Rhythm: Normal rate. Pulmonary:      Breath sounds: Normal breath sounds. Abdominal:      General: Bowel sounds are normal.      Palpations: Abdomen is soft. Musculoskeletal: Normal range of motion. Skin:     General: Skin is warm and dry. Neurological:      Mental Status: He is alert and oriented to person, place, and time. Psychiatric:         Behavior: Behavior normal.     Large axillary abscess on the right side. Incision and drainage today. Culture was sent. Placed on Septra DS twice a day 14 days. Probable staph infection. Additional treatment for skin rash provided. Reassessment 1 week and sooner if problems. Plan Per Assessment:  Ami Zamora was seen today for rash. Diagnoses and all orders for this visit:    Abscess of axilla, right  -     Culture, MRSA, Screening; Future    Dermatitis    Other orders  -     predniSONE (DELTASONE) 10 MG tablet; 3 x 5 days then 2 x  5 days then 1 x 5days  -     sulfamethoxazole-trimethoprim (BACTRIM DS;SEPTRA DS) 800-160 MG per tablet; Take 1 tablet by mouth 2 times daily for 14 days  -     desoximetasone (TOPICORT) 0.25 % cream; Apply topically 2 times daily. Return in about 1 week (around 7/20/2020).       Patricia Houston DO    Note was generated with the assistance of

## 2020-07-15 LAB — MRSA CULTURE ONLY: NORMAL

## 2020-07-20 ENCOUNTER — OFFICE VISIT (OUTPATIENT)
Dept: PRIMARY CARE CLINIC | Age: 60
End: 2020-07-20
Payer: MEDICAID

## 2020-07-20 VITALS
TEMPERATURE: 98.9 F | HEART RATE: 94 BPM | DIASTOLIC BLOOD PRESSURE: 72 MMHG | OXYGEN SATURATION: 97 % | SYSTOLIC BLOOD PRESSURE: 134 MMHG

## 2020-07-20 PROBLEM — M77.42 METATARSALGIA OF BOTH FEET: Status: ACTIVE | Noted: 2020-05-20

## 2020-07-20 PROBLEM — M77.41 METATARSALGIA OF BOTH FEET: Status: ACTIVE | Noted: 2020-05-20

## 2020-07-20 PROCEDURE — 3017F COLORECTAL CA SCREEN DOC REV: CPT | Performed by: FAMILY MEDICINE

## 2020-07-20 PROCEDURE — 99214 OFFICE O/P EST MOD 30 MIN: CPT | Performed by: FAMILY MEDICINE

## 2020-07-20 PROCEDURE — G8417 CALC BMI ABV UP PARAM F/U: HCPCS | Performed by: FAMILY MEDICINE

## 2020-07-20 PROCEDURE — G8427 DOCREV CUR MEDS BY ELIG CLIN: HCPCS | Performed by: FAMILY MEDICINE

## 2020-07-20 PROCEDURE — 4004F PT TOBACCO SCREEN RCVD TLK: CPT | Performed by: FAMILY MEDICINE

## 2020-07-20 ASSESSMENT — ENCOUNTER SYMPTOMS
EYES NEGATIVE: 1
RESPIRATORY NEGATIVE: 1
ALLERGIC/IMMUNOLOGIC NEGATIVE: 1
GASTROINTESTINAL NEGATIVE: 1

## 2020-07-20 NOTE — PROGRESS NOTES
20     Mg Jaffe    : 1960 Sex: male   Age: 62 y.o. Chief Complaint   Patient presents with    Other     recheck abscess right underarm. Improving not having to use dressing over    Numbness     notices numbness feet when done working for the day       Prior to Admission medications    Medication Sig Start Date End Date Taking? Authorizing Provider   predniSONE (DELTASONE) 10 MG tablet 3 x 5 days then 2 x  5 days then 1 x 5days 20  Yes Rosa Maria Rader DO   sulfamethoxazole-trimethoprim (BACTRIM DS;SEPTRA DS) 800-160 MG per tablet Take 1 tablet by mouth 2 times daily for 14 days 20 Yes Ravinder Rader,    desoximetasone (TOPICORT) 0.25 % cream Apply topically 2 times daily. 20  Yes Rosa Maria Rader DO   hydroCHLOROthiazide (HYDRODIURIL) 25 MG tablet Take 1 tablet by mouth every morning 20  Yes Rosa Maria Rader DO   pravastatin (PRAVACHOL) 40 MG tablet Take 1 tablet by mouth daily 19  Yes Rosa Maria Rader DO   amLODIPine (NORVASC) 5 MG tablet Take 1 tablet by mouth daily 1 tablet daily 19  Yes Rosa Maria Rader DO   metFORMIN (GLUCOPHAGE XR) 500 MG extended release tablet Take 1 tablet by mouth daily (with breakfast) 19  Yes Rosa Maria Rader DO   adalimumab (HUMIRA PEN) 40 MG/0.8ML injection Inject into the skin   Yes Historical Provider, MD   sildenafil (REVATIO) 20 MG tablet 2-3 tabs as needed   Yes Historical Provider, MD          HPI: Patient evaluated today hypertension hyperlipidemia psoriasis eczematous skin condition. We will have him further evaluated with Dr. Charlie Kunz. Previously seeing Dr. Gloria Lane and needed to adjust because of insurance. Recent axillary abscess is healing well and responding well to antibiotics and incision and drainage that was completed. Bilateral foot pain is present. Metatarsalgia is present. X-rays to be completed and then podiatry consultation.         Review of Systems Constitutional: Negative. HENT: Negative. Eyes: Negative. Respiratory: Negative. Gastrointestinal: Negative. Endocrine: Negative. Genitourinary: Negative. Musculoskeletal: Positive for arthralgias. Skin: Positive for rash. Allergic/Immunologic: Negative. Neurological: Negative. Hematological: Negative. Psychiatric/Behavioral: Negative. Systems review stable aside from bilateral foot pain as noted. Podiatry consultation and x-rays. Current Outpatient Medications:     predniSONE (DELTASONE) 10 MG tablet, 3 x 5 days then 2 x  5 days then 1 x 5days, Disp: 30 tablet, Rfl: 0    sulfamethoxazole-trimethoprim (BACTRIM DS;SEPTRA DS) 800-160 MG per tablet, Take 1 tablet by mouth 2 times daily for 14 days, Disp: 28 tablet, Rfl: 0    desoximetasone (TOPICORT) 0.25 % cream, Apply topically 2 times daily. , Disp: 30 g, Rfl: 1    hydroCHLOROthiazide (HYDRODIURIL) 25 MG tablet, Take 1 tablet by mouth every morning, Disp: 90 tablet, Rfl: 3    pravastatin (PRAVACHOL) 40 MG tablet, Take 1 tablet by mouth daily, Disp: 90 tablet, Rfl: 3    amLODIPine (NORVASC) 5 MG tablet, Take 1 tablet by mouth daily 1 tablet daily, Disp: 90 tablet, Rfl: 3    metFORMIN (GLUCOPHAGE XR) 500 MG extended release tablet, Take 1 tablet by mouth daily (with breakfast), Disp: 90 tablet, Rfl: 3    adalimumab (HUMIRA PEN) 40 MG/0.8ML injection, Inject into the skin, Disp: , Rfl:     sildenafil (REVATIO) 20 MG tablet, 2-3 tabs as needed, Disp: , Rfl:     No Known Allergies    Social History     Tobacco Use    Smoking status: Heavy Tobacco Smoker     Packs/day: 1.50     Years: 30.00     Pack years: 45.00     Types: Cigarettes    Smokeless tobacco: Never Used   Substance Use Topics    Alcohol use: Yes     Comment: beer     Drug use: No      Past Surgical History:   Procedure Laterality Date    COLONOSCOPY  12/05/2016    JOINT REPLACEMENT Left     knee    KNEE ARTHROPLASTY       Family History   Problem Relation Age of Onset    Heart Disease Mother     Cancer Mother         Brain    Cancer Father         colon, Pancreatic    Heart Attack Father     Diabetes Father      Past Medical History:   Diagnosis Date    Abscess     Hyperlipidemia     Hypertension     Impaired fasting glucose     Psoriasis        Vitals:    07/20/20 1347   BP: 134/72   Pulse: 94   Temp: 98.9 °F (37.2 °C)   SpO2: 97%     BP Readings from Last 3 Encounters:   07/20/20 134/72   07/13/20 (!) 142/82   05/20/20 (!) 140/78        Physical Exam  Vitals signs and nursing note reviewed. Constitutional:       Appearance: He is well-developed. HENT:      Head: Normocephalic. Right Ear: External ear normal.      Left Ear: External ear normal.      Nose: Nose normal.   Eyes:      Conjunctiva/sclera: Conjunctivae normal.      Pupils: Pupils are equal, round, and reactive to light. Neck:      Musculoskeletal: Normal range of motion and neck supple. Cardiovascular:      Rate and Rhythm: Normal rate. Pulmonary:      Breath sounds: Normal breath sounds. Abdominal:      General: Bowel sounds are normal.      Palpations: Abdomen is soft. Musculoskeletal: Normal range of motion. Skin:     General: Skin is warm and dry. Neurological:      Mental Status: He is alert and oriented to person, place, and time. Psychiatric:         Behavior: Behavior normal.     Today's physical exam is stable. Medications as prescribed. Complete antibiotics as noted. Abscess is resolved. Rash is improved. Dermatology consultation. Bilateral foot x-rays and podiatry consultation for metatarsalgia. Reassessment in August.          Plan Per Assessment:  Shereen Goldstein was seen today for other and numbness.     Diagnoses and all orders for this visit:    Abscess of axilla, right    Essential hypertension    Psoriasis  -     Viri Jean Baptiste MD,  Dermatology, [facility] ([facility])    Hyperlipidemia, unspecified hyperlipidemia type    Dermatitis  -

## 2020-08-01 ENCOUNTER — HOSPITAL ENCOUNTER (EMERGENCY)
Age: 60
Discharge: HOME | End: 2020-08-01
Payer: MEDICAID

## 2020-08-01 VITALS
RESPIRATION RATE: 16 BRPM | TEMPERATURE: 97.7 F | OXYGEN SATURATION: 98 % | HEIGHT: 66 IN | DIASTOLIC BLOOD PRESSURE: 96 MMHG | SYSTOLIC BLOOD PRESSURE: 164 MMHG | HEART RATE: 70 BPM | BODY MASS INDEX: 30.53 KG/M2 | WEIGHT: 190 LBS

## 2020-08-01 PROCEDURE — 99282 EMERGENCY DEPT VISIT SF MDM: CPT

## 2020-08-01 RX ORDER — TRIAMCINOLONE ACETONIDE 1 MG/G
CREAM TOPICAL
Qty: 80 G | Refills: 1 | Status: SHIPPED | OUTPATIENT
Start: 2020-08-01

## 2020-08-01 NOTE — ED PROVIDER NOTES
for this visit on 08/01/20. RADIOLOGY:  Interpreted by Radiologist.  No orders to display       ------------------------- NURSING NOTES AND VITALS REVIEWED ---------------------------   The nursing notes within the ED encounter and vital signs as below have been reviewed. BP (!) 164/96   Pulse 70   Temp 97.7 °F (36.5 °C) (Infrared)   Resp 16   Ht 5' 6\" (1.676 m)   Wt 190 lb (86.2 kg)   SpO2 98%   BMI 30.67 kg/m²   Oxygen Saturation Interpretation: Normal      ---------------------------------------------------PHYSICAL EXAM--------------------------------------      Constitutional/General: Alert and oriented x3, well appearing, non toxic in NAD  Head: Normocephalic and atraumatic  Eyes: PERRL, EOMI  Mouth: Oropharynx clear, handling secretions, no trismus  Neck: Supple, full ROM,   Pulmonary: Lungs clear to auscultation bilaterally, no wheezes, rales, or rhonchi. Not in respiratory distress  Cardiovascular:  Regular rate and rhythm, no murmurs, gallops, or rubs. 2+ distal pulses  Abdomen: Soft, non tender, non distended,   Extremities: Moves all extremities x 4. Warm and well perfused  Skin: warm and dry, excoriated macular rash to bilateral forearms, bilateral lower legs, and stomach, no fluctuance, no drainage, no erythema, no petechiae. Neurologic: GCS 15,  Psych: Normal Affect      ------------------------------ ED COURSE/MEDICAL DECISION MAKING----------------------  Medications - No data to display      ED COURSE:       Medical Decision Making:    Patient is 55-year-old male presenting emergency department for rash to bilateral upper and lower extremities consistent with a dermatitis. He has had this in the past.  No inciting event. Patient was treated with topical steroids in the past with complete resolution of his symptoms. Will initiate the same treatment. Recommend close follow-up with PCP for recheck return to the emergency department any new worsening symptoms.   No other signs of systemic illness at this time. Patient voiced understanding and is agreeable to the above treatment plan. Counseling: The emergency provider has spoken with the patient and discussed todays results, in addition to providing specific details for the plan of care and counseling regarding the diagnosis and prognosis. Questions are answered at this time and they are agreeable with the plan.      --------------------------------- IMPRESSION AND DISPOSITION ---------------------------------    IMPRESSION  1. Dermatitis        DISPOSITION  Disposition: Discharge to home  Patient condition is stable      NOTE: This report was transcribed using voice recognition software.  Every effort was made to ensure accuracy; however, inadvertent computerized transcription errors may be present        Maria Del Carmen Granda  08/01/20 4727

## 2020-08-05 ENCOUNTER — OFFICE VISIT (OUTPATIENT)
Dept: PODIATRY | Age: 60
End: 2020-08-05
Payer: MEDICAID

## 2020-08-05 VITALS
TEMPERATURE: 98.2 F | BODY MASS INDEX: 30.51 KG/M2 | SYSTOLIC BLOOD PRESSURE: 128 MMHG | WEIGHT: 189 LBS | DIASTOLIC BLOOD PRESSURE: 82 MMHG

## 2020-08-05 PROCEDURE — G8417 CALC BMI ABV UP PARAM F/U: HCPCS | Performed by: PODIATRIST

## 2020-08-05 PROCEDURE — 4004F PT TOBACCO SCREEN RCVD TLK: CPT | Performed by: PODIATRIST

## 2020-08-05 PROCEDURE — G8427 DOCREV CUR MEDS BY ELIG CLIN: HCPCS | Performed by: PODIATRIST

## 2020-08-05 PROCEDURE — 99213 OFFICE O/P EST LOW 20 MIN: CPT | Performed by: PODIATRIST

## 2020-08-05 PROCEDURE — 20600 DRAIN/INJ JOINT/BURSA W/O US: CPT | Performed by: PODIATRIST

## 2020-08-05 PROCEDURE — 3017F COLORECTAL CA SCREEN DOC REV: CPT | Performed by: PODIATRIST

## 2020-08-05 RX ORDER — BUPIVACAINE HYDROCHLORIDE 5 MG/ML
1 INJECTION, SOLUTION PERINEURAL ONCE
Status: COMPLETED | OUTPATIENT
Start: 2020-08-05 | End: 2020-08-05

## 2020-08-05 RX ORDER — DEXAMETHASONE SODIUM PHOSPHATE 4 MG/ML
4 INJECTION, SOLUTION INTRA-ARTICULAR; INTRALESIONAL; INTRAMUSCULAR; INTRAVENOUS; SOFT TISSUE ONCE
Status: COMPLETED | OUTPATIENT
Start: 2020-08-05 | End: 2020-08-05

## 2020-08-05 RX ORDER — MELOXICAM 15 MG/1
15 TABLET ORAL DAILY
Qty: 30 TABLET | Refills: 3 | Status: SHIPPED
Start: 2020-08-05 | End: 2020-09-03

## 2020-08-05 RX ADMIN — BUPIVACAINE HYDROCHLORIDE 5 MG: 5 INJECTION, SOLUTION PERINEURAL at 17:00

## 2020-08-05 RX ADMIN — DEXAMETHASONE SODIUM PHOSPHATE 4 MG: 4 INJECTION, SOLUTION INTRA-ARTICULAR; INTRALESIONAL; INTRAMUSCULAR; INTRAVENOUS; SOFT TISSUE at 17:00

## 2020-08-05 NOTE — PROGRESS NOTES
[facility] PODIATRY  [address] [place] [address]  Dept: [phone]  Dept Fax: [phone]     PATIENT PROGRESS NOTE  Date of patient's visit: 8/5/2020  Patient's Name:  Celine Jamison YOB: 1960            Patient Care Team:  Ivonne Alves DO as PCP - [place] [id number] as PCP - [facility] Empaneled Provider        Chief Complaint   Patient presents with    Diabetes     new pt referred by Dr. Amber Gomez for pain in both feet had xrays done on 7/20/2020    Foot Pain    Established New Doctor       HPI  HPI:   Celine Jamison is a 62 y.o. male who presents to the office today complaining of this patient is complaining of right foot left pain right worse than left. Patient states that I have seen him years ago but today is a new issue. Patient is seen mostly for right ball of foot pain this is mostly after work. .  . Currently denies F/C/N/V. No Known Allergies    Past Medical History:   Diagnosis Date    Abscess     Hyperlipidemia     Hypertension     Impaired fasting glucose     Psoriasis        Prior to Admission medications    Medication Sig Start Date End Date Taking? Authorizing Provider   meloxicam (MOBIC) 15 MG tablet Take 1 tablet by mouth daily 8/5/20  Yes Leonila Gonzales DPM   triamcinolone (KENALOG) 0.1 % cream Apply topically 2 times daily. 8/1/20  Yes LEONARDO Babcock   desoximetasone (TOPICORT) 0.25 % cream Apply topically 2 times daily.  7/13/20  Yes Nigel Rader, DO   hydroCHLOROthiazide (HYDRODIURIL) 25 MG tablet Take 1 tablet by mouth every morning 5/20/20  Yes Nigel Rader DO   pravastatin (PRAVACHOL) 40 MG tablet Take 1 tablet by mouth daily 11/20/19  Yes Nigel Rader DO   amLODIPine (NORVASC) 5 MG tablet Take 1 tablet by mouth daily 1 tablet daily 11/20/19  Yes Nigel Rader, DO   metFORMIN (GLUCOPHAGE XR) 500 MG extended release tablet Take 1 tablet by mouth daily (with breakfast) 11/20/19  Yes Kevin Rader DO   adalimumab (HUMIRA PEN) 40 MG/0.8ML injection Inject into the skin   Yes Historical Provider, MD   sildenafil (REVATIO) 20 MG tablet 2-3 tabs as needed   Yes Historical Provider, MD       Past Surgical History:   Procedure Laterality Date    COLONOSCOPY  12/05/2016    JOINT REPLACEMENT Left     knee    KNEE ARTHROPLASTY         Family History   Problem Relation Age of Onset    Heart Disease Mother     Cancer Mother         Brain    Cancer Father         colon, Pancreatic    Heart Attack Father     Diabetes Father        Social History     Tobacco Use    Smoking status: Heavy Tobacco Smoker     Packs/day: 1.50     Years: 30.00     Pack years: 45.00     Types: Cigarettes    Smokeless tobacco: Never Used   Substance Use Topics    Alcohol use: Yes     Comment: beer        Review of Systems    Review of Systems:   History obtained from chart review and the patient  General ROS: negative for - chills, fatigue, fever, night sweats or weight gain  Constitutional: Negative for chills, diaphoresis, fatigue, fever and unexpected weight change. Musculoskeletal: Positive for . Neurological ROS: negative for - behavioral changes, confusion, headaches or seizures. Negative for weakness and numbness. Dermatological ROS: negative for - mole changes, rash  Cardiovascular: Negative for leg swelling. Gastrointestinal: Negative for constipation, diarrhea, nausea and vomiting. Lower Extremity Physical Examination:   Vitals:   Vitals:    08/05/20 1635   BP: 128/82   Temp: 98.2 °F (36.8 °C)        Foot Exam  Ortho Exam    General: AAO x 3 in NAD. Dermatologic Exam:  Skin lesion/ulceration Absent . Skin No rashes or nodules noted. .   Musculoskeletal:   Orthopedic examination revealed pain with palpation to the second third metatarsal phalangeal joint right foot there was full range of motion there was a short first metatarsal bilateral with full range of motion negative pain with palpation first metatarsal phalangeal joint bilaterally. Vascular:     Radiographs: X-ray has been reviewed he has a brachial metatarsal first digit multiple arthritic joints through the midfoot and rear foot inferior posterior calcaneal spurs multiple focal sclerotic regions to the metatarsals. 3 views Xr Foot Left (min 3 Views)    Result Date: 7/20/2020  1. Multifocal degenerative joint disease. 2. Findings of chronic plantar fasciitis. Xr Foot Right (min 3 Views)    Result Date: 7/20/2020  There is no fracture or dislocation of the right foot. Multifocal degenerative joint disease. Findings of chronic plantar fasciitis. foot/ankle:     Asessment: Patient is a 62 y.o. male with:    Diagnosis Orders   1. Arthritis of foot     2. Pain in left toe(s)     3. Pain in toe of right foot     4. Metatarsalgia of both feet         Plan: Patient examined and evaluated. Right foot injection:  Potential risks, complications, alternative treatment options and procedure prognosis were explained to the patient. Verbal and signed informed consent were obtained from the patient. An antiseptic preparation of the skin was performed with ChloraPrep 1 cc of 1.5% Marcaine plain 1 cc of dexamethasone into the second and third metatarsal phalangeal joints right foot no adverse reaction was observed. The puncture site was covered with an adhesive bandage and the patient was fitted with a removable metatarsal pad. Post injection instructions were given. We did discuss making diabetic orthotics for the patient as well as Mobic once a day and an injection. Current condition and treatment options discussed in detail. Discussed conservative and surgical options with the patient. Treatment options today consisted of verbal and written instructions given to patient. Contact office with any questions/problems/concerns. RTC in 4week(s).     8/5/2020    Electronically signed by Chantelle Vazquez DPM on 8/5/2020 at 5:16 PM  8/5/2020

## 2020-08-13 ENCOUNTER — TELEPHONE (OUTPATIENT)
Dept: PODIATRY | Age: 60
End: 2020-08-13

## 2020-08-13 NOTE — TELEPHONE ENCOUNTER
Called insurance co to check benefits for orthotics . Pt has a $85 copay due after deductible is met. Deductible is [id number] with [id number] met. No prior auth, no frequency limit . Spoke to james call ref # [id number]    Called patient to let him know deductible information and that he would be responsible for the cost of the orthotics. He states he will speak with his wife and call back with his decision to move forward with being casted for the orthotics.

## 2020-08-20 ENCOUNTER — OFFICE VISIT (OUTPATIENT)
Dept: PRIMARY CARE CLINIC | Age: 60
End: 2020-08-20
Payer: MEDICAID

## 2020-08-20 ENCOUNTER — HOSPITAL ENCOUNTER (OUTPATIENT)
Age: 60
Discharge: HOME | End: 2020-08-22
Payer: MEDICAID

## 2020-08-20 VITALS
WEIGHT: 188 LBS | TEMPERATURE: 97.9 F | DIASTOLIC BLOOD PRESSURE: 74 MMHG | SYSTOLIC BLOOD PRESSURE: 148 MMHG | RESPIRATION RATE: 16 BRPM | HEART RATE: 88 BPM | OXYGEN SATURATION: 98 % | BODY MASS INDEX: 30.34 KG/M2

## 2020-08-20 LAB
ALBUMIN SERPL-MCNC: 4.4 G/DL (ref 3.5–5.2)
ALP BLD-CCNC: 70 U/L (ref 40–129)
ALT SERPL-CCNC: 15 U/L (ref 0–40)
ANION GAP SERPL CALCULATED.3IONS-SCNC: 17 MMOL/L (ref 7–16)
AST SERPL-CCNC: 16 U/L (ref 0–39)
BILIRUB SERPL-MCNC: 0.5 MG/DL (ref 0–1.2)
BUN BLDV-MCNC: 18 MG/DL (ref 8–23)
CALCIUM SERPL-MCNC: 9.8 MG/DL (ref 8.6–10.2)
CHLORIDE BLD-SCNC: 102 MMOL/L (ref 98–107)
CHOLESTEROL, TOTAL: 168 MG/DL (ref 0–199)
CO2: 24 MMOL/L (ref 22–29)
CREAT SERPL-MCNC: 1.2 MG/DL (ref 0.7–1.2)
GFR AFRICAN AMERICAN: >60
GFR NON-AFRICAN AMERICAN: >60 ML/MIN/1.73
GLUCOSE BLD-MCNC: 112 MG/DL (ref 74–99)
HBA1C MFR BLD: 6.1 % (ref 4–5.6)
HDLC SERPL-MCNC: 36 MG/DL
LDL CHOLESTEROL CALCULATED: 110 MG/DL (ref 0–99)
POTASSIUM SERPL-SCNC: 3.8 MMOL/L (ref 3.5–5)
SODIUM BLD-SCNC: 143 MMOL/L (ref 132–146)
TOTAL PROTEIN: 6.9 G/DL (ref 6.4–8.3)
TRIGL SERPL-MCNC: 108 MG/DL (ref 0–149)
VLDLC SERPL CALC-MCNC: 22 MG/DL

## 2020-08-20 PROCEDURE — 3017F COLORECTAL CA SCREEN DOC REV: CPT | Performed by: FAMILY MEDICINE

## 2020-08-20 PROCEDURE — G8427 DOCREV CUR MEDS BY ELIG CLIN: HCPCS | Performed by: FAMILY MEDICINE

## 2020-08-20 PROCEDURE — 80053 COMPREHEN METABOLIC PANEL: CPT

## 2020-08-20 PROCEDURE — 83036 HEMOGLOBIN GLYCOSYLATED A1C: CPT

## 2020-08-20 PROCEDURE — 80061 LIPID PANEL: CPT

## 2020-08-20 PROCEDURE — 4004F PT TOBACCO SCREEN RCVD TLK: CPT | Performed by: FAMILY MEDICINE

## 2020-08-20 PROCEDURE — 99214 OFFICE O/P EST MOD 30 MIN: CPT | Performed by: FAMILY MEDICINE

## 2020-08-20 PROCEDURE — 36415 COLL VENOUS BLD VENIPUNCTURE: CPT

## 2020-08-20 PROCEDURE — G8417 CALC BMI ABV UP PARAM F/U: HCPCS | Performed by: FAMILY MEDICINE

## 2020-08-20 NOTE — PROGRESS NOTES
20     Mg Jaffe    : 1960 Sex: male   Age: 62 y.o. Chief Complaint   Patient presents with    Hypertension    Hyperlipidemia    Discuss Labs       Prior to Admission medications    Medication Sig Start Date End Date Taking? Authorizing Provider   meloxicam (MOBIC) 15 MG tablet Take 1 tablet by mouth daily 20  Yes Johnathon Bernheim, DPM   triamcinolone (KENALOG) 0.1 % cream Apply topically 2 times daily. 20  Yes LEONARDO Plascencia   desoximetasone (TOPICORT) 0.25 % cream Apply topically 2 times daily. 20  Yes Nya Rader DO   hydroCHLOROthiazide (HYDRODIURIL) 25 MG tablet Take 1 tablet by mouth every morning 20  Yes Nya Rader DO   pravastatin (PRAVACHOL) 40 MG tablet Take 1 tablet by mouth daily 19  Yes Nya Rader DO   amLODIPine (NORVASC) 5 MG tablet Take 1 tablet by mouth daily 1 tablet daily 19  Yes Nya Rader DO   metFORMIN (GLUCOPHAGE XR) 500 MG extended release tablet Take 1 tablet by mouth daily (with breakfast) 19  Yes Nya Rader DO   adalimumab (HUMIRA PEN) 40 MG/0.8ML injection Inject into the skin   Yes Historical Provider, MD   sildenafil (REVATIO) 20 MG tablet 2-3 tabs as needed   Yes Historical Provider, MD          HPI: Patient presents today in follow-up doing well blood pressure is under control. Psoriasis will be seeing dermatology. Dr. Leilani Maldonado. Hyperlipidemia stable impaired fasting glucose stable. Review of Systems   Constitutional: Negative. HENT: Negative. Eyes: Negative. Respiratory: Negative. Gastrointestinal: Negative. Endocrine: Negative. Genitourinary: Negative. Musculoskeletal: Negative. Skin: Negative. Allergic/Immunologic: Negative. Neurological: Negative. Hematological: Negative. Psychiatric/Behavioral: Negative. Systems review stable as noted.         Current Outpatient Medications:     meloxicam (MOBIC) 15 MG tablet, Take 1 tablet by mouth daily, Disp: 30 tablet, Rfl: 3    triamcinolone (KENALOG) 0.1 % cream, Apply topically 2 times daily. , Disp: 80 g, Rfl: 1    desoximetasone (TOPICORT) 0.25 % cream, Apply topically 2 times daily. , Disp: 30 g, Rfl: 1    hydroCHLOROthiazide (HYDRODIURIL) 25 MG tablet, Take 1 tablet by mouth every morning, Disp: 90 tablet, Rfl: 3    pravastatin (PRAVACHOL) 40 MG tablet, Take 1 tablet by mouth daily, Disp: 90 tablet, Rfl: 3    amLODIPine (NORVASC) 5 MG tablet, Take 1 tablet by mouth daily 1 tablet daily, Disp: 90 tablet, Rfl: 3    metFORMIN (GLUCOPHAGE XR) 500 MG extended release tablet, Take 1 tablet by mouth daily (with breakfast), Disp: 90 tablet, Rfl: 3    adalimumab (HUMIRA PEN) 40 MG/0.8ML injection, Inject into the skin, Disp: , Rfl:     sildenafil (REVATIO) 20 MG tablet, 2-3 tabs as needed, Disp: , Rfl:     No Known Allergies    Social History     Tobacco Use    Smoking status: Heavy Tobacco Smoker     Packs/day: 1.50     Years: 30.00     Pack years: 45.00     Types: Cigarettes    Smokeless tobacco: Never Used   Substance Use Topics    Alcohol use: Yes     Comment: beer     Drug use: No      Past Surgical History:   Procedure Laterality Date    COLONOSCOPY  12/05/2016    JOINT REPLACEMENT Left     knee    KNEE ARTHROPLASTY       Family History   Problem Relation Age of Onset    Heart Disease Mother     Cancer Mother         Brain    Cancer Father         colon, Pancreatic    Heart Attack Father     Diabetes Father      Past Medical History:   Diagnosis Date    Abscess     Hyperlipidemia     Hypertension     Impaired fasting glucose     Psoriasis        Vitals:    08/20/20 1543   BP: (!) 148/74   Pulse: 88   Resp: 16   Temp: 97.9 °F (36.6 °C)   TempSrc: Temporal   SpO2: 98%   Weight: 188 lb (85.3 kg)     BP Readings from Last 3 Encounters:   08/20/20 (!) 148/74   08/05/20 128/82   08/01/20 (!) 164/96        Physical Exam  Vitals signs and nursing note reviewed.    Constitutional: Appearance: He is well-developed. HENT:      Head: Normocephalic. Right Ear: External ear normal.      Left Ear: External ear normal.      Nose: Nose normal.   Eyes:      Conjunctiva/sclera: Conjunctivae normal.      Pupils: Pupils are equal, round, and reactive to light. Neck:      Musculoskeletal: Normal range of motion and neck supple. Cardiovascular:      Rate and Rhythm: Normal rate. Pulmonary:      Breath sounds: Normal breath sounds. Abdominal:      General: Bowel sounds are normal.      Palpations: Abdomen is soft. Musculoskeletal: Normal range of motion. Skin:     General: Skin is warm and dry. Neurological:      Mental Status: He is alert and oriented to person, place, and time. Psychiatric:         Behavior: Behavior normal.     Today's vitals physical examination stable. We will maintain present meds and care. Reassessment 3 months and sooner if problems.       No results found for: TSH, W9RPMWZ, W7OSLBR, THYROIDAB, FT3, T4FREE  Lab Results   Component Value Date    CHOL 168 08/20/2020    CHOL 173 05/20/2020     Lab Results   Component Value Date    TRIG 108 08/20/2020    TRIG 111 05/20/2020     Lab Results   Component Value Date    HDL 36 08/20/2020    HDL 34 05/20/2020     No results found for: [facility]  Lab Results   Component Value Date    LABVLDL 22 08/20/2020    LABVLDL 22 05/20/2020     Lab Results   Component Value Date    CHOLHDLRATIO 4.9 01/18/2020    CHOLHDLRATIO 4.8 11/03/2018     Lab Results   Component Value Date    WBC 4.6 10/11/2018    HGB 12.8 10/11/2018    HCT 36.2 (L) 10/11/2018    MCV 87.2 10/11/2018     10/11/2018    LYMPHOPCT 22.1 10/11/2018    RBC 4.15 10/11/2018    MCH 30.8 10/11/2018    MCHC 35.4 (H) 10/11/2018    RDW 13.7 10/11/2018     Lab Results   Component Value Date     08/20/2020    K 3.8 08/20/2020     08/20/2020    CO2 24 08/20/2020    BUN 18 08/20/2020    CREATININE 1.2 08/20/2020    GLUCOSE 112 (H) 08/20/2020 CALCIUM 9.8 08/20/2020    PROT 6.9 08/20/2020    LABALBU 4.4 08/20/2020    BILITOT 0.5 08/20/2020    ALKPHOS 70 08/20/2020    AST 16 08/20/2020    ALT 15 08/20/2020    LABGLOM >60 08/20/2020    GFRAA >60 08/20/2020      No results found for: PSA, PSADIA   Lab Results   Component Value Date    LABA1C 6.1 (H) 08/20/2020    LABA1C 6.4 (H) 05/20/2020    LABA1C 6.1 01/18/2020     No results found for: EAG     Plan Per Assessment:  [place] Imelda was seen today for hypertension, hyperlipidemia and discuss labs. Diagnoses and all orders for this visit:    Essential hypertension  -     Comprehensive Metabolic Panel; Future  -     Hemoglobin A1C; Future  -     Lipid Panel; Future    Impaired fasting glucose  -     Comprehensive Metabolic Panel; Future  -     Hemoglobin A1C; Future  -     Lipid Panel; Future    Hyperlipidemia, unspecified hyperlipidemia type  -     Comprehensive Metabolic Panel; Future  -     Hemoglobin A1C; Future  -     Lipid Panel; Future    Cigarette nicotine dependence without complication    Psoriasis    Metatarsalgia of both feet            Return in about 3 months (around 11/20/2020). Janki Kimbrough DO    Note was generated with the assistance of voice recognition software. Document was reviewed however may contain grammatical errors.

## 2020-09-03 ENCOUNTER — OFFICE VISIT (OUTPATIENT)
Dept: PODIATRY | Age: 60
End: 2020-09-03
Payer: MEDICAID

## 2020-09-03 VITALS
DIASTOLIC BLOOD PRESSURE: 82 MMHG | BODY MASS INDEX: 29.73 KG/M2 | TEMPERATURE: 97.9 F | WEIGHT: 185 LBS | HEIGHT: 66 IN | SYSTOLIC BLOOD PRESSURE: 139 MMHG

## 2020-09-03 PROCEDURE — 99213 OFFICE O/P EST LOW 20 MIN: CPT | Performed by: PODIATRIST

## 2020-09-03 PROCEDURE — G8417 CALC BMI ABV UP PARAM F/U: HCPCS | Performed by: PODIATRIST

## 2020-09-03 PROCEDURE — G8427 DOCREV CUR MEDS BY ELIG CLIN: HCPCS | Performed by: PODIATRIST

## 2020-09-03 PROCEDURE — 3017F COLORECTAL CA SCREEN DOC REV: CPT | Performed by: PODIATRIST

## 2020-09-03 PROCEDURE — 4004F PT TOBACCO SCREEN RCVD TLK: CPT | Performed by: PODIATRIST

## 2020-09-03 PROCEDURE — 20600 DRAIN/INJ JOINT/BURSA W/O US: CPT | Performed by: PODIATRIST

## 2020-09-03 RX ORDER — PREDNISONE 10 MG/1
10 TABLET ORAL DAILY
Qty: 18 TABLET | Refills: 1 | Status: SHIPPED | OUTPATIENT
Start: 2020-09-03 | End: 2020-09-12

## 2020-09-03 RX ORDER — BUPIVACAINE HYDROCHLORIDE 5 MG/ML
1 INJECTION, SOLUTION PERINEURAL ONCE
Status: COMPLETED | OUTPATIENT
Start: 2020-09-03 | End: 2020-09-03

## 2020-09-03 RX ORDER — METHYLPREDNISOLONE ACETATE 40 MG/ML
40 INJECTION, SUSPENSION INTRA-ARTICULAR; INTRALESIONAL; INTRAMUSCULAR; SOFT TISSUE ONCE
Status: COMPLETED | OUTPATIENT
Start: 2020-09-03 | End: 2020-09-03

## 2020-09-03 RX ADMIN — METHYLPREDNISOLONE ACETATE 40 MG: 40 INJECTION, SUSPENSION INTRA-ARTICULAR; INTRALESIONAL; INTRAMUSCULAR; SOFT TISSUE at 07:55

## 2020-09-03 RX ADMIN — BUPIVACAINE HYDROCHLORIDE 5 MG: 5 INJECTION, SOLUTION PERINEURAL at 07:54

## 2020-09-03 NOTE — PROGRESS NOTES
9/3/20  Mg Jaffe : 1960 Sex: male  Age: 62 y.o. Patient was referred by Derrick Cartagena DO    Chief Complaint   Patient presents with    Foot Pain     b/l arthritis had an inj right foot last visit we ck'd ins about inserts but high ded not met yet he would be resp for full amount of inserts    Diabetes     stopped taking the mobic he got hives not sure if that is from that med       SUBJECTIVE pt seen for f/u  Right foot pain injection did help  Left foot pain started to get worse no trauma  Noted   HPI  Review of Systems  Const: Denies constitutional symptoms  Musculo: Denies symptoms other than stated above  Skin: Denies symptoms other than stated above       Current Outpatient Medications:     predniSONE (DELTASONE) 10 MG tablet, Take 1 tablet by mouth daily for 9 days 3 tabs  Qd x 3  days   2 tabs qd x 3 days  1 tab qd  X 3 days, Disp: 18 tablet, Rfl: 1    triamcinolone (KENALOG) 0.1 % cream, Apply topically 2 times daily. , Disp: 80 g, Rfl: 1    desoximetasone (TOPICORT) 0.25 % cream, Apply topically 2 times daily. , Disp: 30 g, Rfl: 1    hydroCHLOROthiazide (HYDRODIURIL) 25 MG tablet, Take 1 tablet by mouth every morning, Disp: 90 tablet, Rfl: 3    pravastatin (PRAVACHOL) 40 MG tablet, Take 1 tablet by mouth daily, Disp: 90 tablet, Rfl: 3    amLODIPine (NORVASC) 5 MG tablet, Take 1 tablet by mouth daily 1 tablet daily, Disp: 90 tablet, Rfl: 3    metFORMIN (GLUCOPHAGE XR) 500 MG extended release tablet, Take 1 tablet by mouth daily (with breakfast), Disp: 90 tablet, Rfl: 3    adalimumab (HUMIRA PEN) 40 MG/0.8ML injection, Inject into the skin, Disp: , Rfl:     sildenafil (REVATIO) 20 MG tablet, 2-3 tabs as needed, Disp: , Rfl:   Allergies   Allergen Reactions    Mobic [Meloxicam] Hives       Past Medical History:   Diagnosis Date    Abscess     Hyperlipidemia     Hypertension     Impaired fasting glucose     Psoriasis      Past Surgical History:   Procedure Laterality Date    COLONOSCOPY  12/05/2016    JOINT REPLACEMENT Left     knee    KNEE ARTHROPLASTY       Family History   Problem Relation Age of Onset    Heart Disease Mother     Cancer Mother         Brain    Cancer Father         colon, Pancreatic    Heart Attack Father     Diabetes Father      Social History     Socioeconomic History    Marital status:      Spouse name: Not on file    Number of children: Not on file    Years of education: Not on file    Highest education level: Not on file   Occupational History    Not on file   Social Needs    Financial resource strain: Not on file    Food insecurity     Worry: Not on file     Inability: Not on file    Transportation needs     Medical: Not on file     Non-medical: Not on file   Tobacco Use    Smoking status: Heavy Tobacco Smoker     Packs/day: 1.50     Years: 30.00     Pack years: 45.00     Types: Cigarettes    Smokeless tobacco: Never Used   Substance and Sexual Activity    Alcohol use: Yes     Comment: beer     Drug use: No    Sexual activity: Not on file   Lifestyle    Physical activity     Days per week: Not on file     Minutes per session: Not on file    Stress: Not on file   Relationships    Social connections     Talks on phone: Not on file     Gets together: Not on file     Attends [employer] service: Not on file     Active member of club or organization: Not on file     Attends meetings of clubs or organizations: Not on file     Relationship status: Not on file    Intimate partner violence     Fear of current or ex partner: Not on file     Emotionally abused: Not on file     Physically abused: Not on file     Forced sexual activity: Not on file   Other Topics Concern    Not on file   Social History Narrative    Not on file       Vitals:    09/03/20 0726   BP: 139/82   Temp: 97.9 °F (36.6 °C)   TempSrc: Temporal   Weight: 185 lb (83.9 kg)   Height: 5' 6\" (1.676 m)       Focused Lower Extremity Physical Exam:  Vitals: 09/03/20 0726   BP: 139/82   Temp: 97.9 °F (36.6 °C)        Foot Exam    General  General Appearance: appears stated age and healthy   Orientation: alert and oriented to person, place, and time       Right Foot/Ankle     Inspection and Palpation  Tenderness: metatarsals       Left Foot/Ankle      Inspection and Palpation  Tenderness: metatarsals              Ortho Exam    Vascular: pulses  dp  pt    Capillary Refill Time:   Hair growth  Skin:    Edema:    Neurologic:      Musculoskeletal/ Orthopedic examination: Orthopedic examination of bilateral feet the right foot had minimal pain with palpation to the second and third metatarsal phalangeal joint mild numbness noted to distal digits. The left foot there was pain with palpation to the second metatarsal phalangeal joint second webspace. Full range of motion  NAIL   Web space  Derm:          Assessment and Plan: Today we discussed treatment options I did give a an injection into the left foot. The right foot were going to continue monitoring I did call in a prednisone Medrol Dosepak for the patient to take for 9 days due to the rash and the pain in both feet. I also advised orthotics but his deductible has not been met so we will hold off on orthotics at this time. Left foot second metatarsal phalangeal joint injection:  Potential risks, complications, alternative treatment options and procedure prognosis were explained to the patient. Verbal and signed informed consent were obtained from the patient. An antiseptic preparation of the skin was performed with ChloraPrep 1 cc 0.5% Marcaine plain 1 cc of Depo-Medrol into the second metatarsal phalangeal joint left foot no adverse reaction was observed. The puncture site was covered with an adhesive bandage and the patient was fitted with a removable metatarsal pad. Post injection instructions were given. Elsa Suarez was seen today for foot pain and diabetes.     Diagnoses and all orders for this

## 2020-09-19 ENCOUNTER — OFFICE VISIT (OUTPATIENT)
Dept: PODIATRY | Age: 60
End: 2020-09-19
Payer: MEDICAID

## 2020-09-19 VITALS
DIASTOLIC BLOOD PRESSURE: 82 MMHG | SYSTOLIC BLOOD PRESSURE: 136 MMHG | TEMPERATURE: 97.9 F | WEIGHT: 189 LBS | BODY MASS INDEX: 30.51 KG/M2

## 2020-09-19 PROCEDURE — 4004F PT TOBACCO SCREEN RCVD TLK: CPT | Performed by: PODIATRIST

## 2020-09-19 PROCEDURE — G8417 CALC BMI ABV UP PARAM F/U: HCPCS | Performed by: PODIATRIST

## 2020-09-19 PROCEDURE — 3017F COLORECTAL CA SCREEN DOC REV: CPT | Performed by: PODIATRIST

## 2020-09-19 PROCEDURE — G8427 DOCREV CUR MEDS BY ELIG CLIN: HCPCS | Performed by: PODIATRIST

## 2020-09-19 PROCEDURE — 99213 OFFICE O/P EST LOW 20 MIN: CPT | Performed by: PODIATRIST

## 2020-09-19 NOTE — PROGRESS NOTES
9/3/20  Mg Jaffe : 1960 Sex: male  Age: 62 y.o. Patient was referred by Yves Castellano DO    Chief Complaint   Patient presents with    Foot Pain     arthritis in both feet last visit had an inj left foot the pain is off and on depending on how long he is on his feet        SUBJECTIVE patient is seen for follow-up of left and right foot pain. Right foot is doing excellent left foot where I gave the injection feels about 80% better only having pain in the second and third toes when he is going up and down stairs. Foot Pain    This is a chronic problem. The current episode started more than 1 year ago. The problem occurs 2 to 4 times per day. The pain is at a severity of 4/10. Review of Systems  Const: Denies constitutional symptoms  Musculo: Denies symptoms other than stated above  Skin: Denies symptoms other than stated above       Current Outpatient Medications:     diclofenac sodium (VOLTAREN) 1 % GEL, Apply 4 g topically 4 times daily, Disp: 4 Tube, Rfl: 1    triamcinolone (KENALOG) 0.1 % cream, Apply topically 2 times daily. , Disp: 80 g, Rfl: 1    desoximetasone (TOPICORT) 0.25 % cream, Apply topically 2 times daily. , Disp: 30 g, Rfl: 1    hydroCHLOROthiazide (HYDRODIURIL) 25 MG tablet, Take 1 tablet by mouth every morning, Disp: 90 tablet, Rfl: 3    pravastatin (PRAVACHOL) 40 MG tablet, Take 1 tablet by mouth daily, Disp: 90 tablet, Rfl: 3    amLODIPine (NORVASC) 5 MG tablet, Take 1 tablet by mouth daily 1 tablet daily, Disp: 90 tablet, Rfl: 3    metFORMIN (GLUCOPHAGE XR) 500 MG extended release tablet, Take 1 tablet by mouth daily (with breakfast), Disp: 90 tablet, Rfl: 3    adalimumab (HUMIRA PEN) 40 MG/0.8ML injection, Inject into the skin, Disp: , Rfl:     sildenafil (REVATIO) 20 MG tablet, 2-3 tabs as needed, Disp: , Rfl:   Allergies   Allergen Reactions    Mobic [Meloxicam] Hives       Past Medical History:   Diagnosis Date    Abscess     Hyperlipidemia Weight: 189 lb (85.7 kg)       Focused Lower Extremity Physical Exam:  Vitals:    09/19/20 0850   BP: 136/82   Temp: 97.9 °F (36.6 °C)        Foot Exam    General  General Appearance: appears stated age and healthy   Orientation: alert and oriented to person, place, and time       Right Foot/Ankle     Inspection and Palpation  Tenderness: metatarsals       Left Foot/Ankle      Inspection and Palpation  Tenderness: metatarsals              Ortho Exam    Vascular: pulses  dp  pt    Capillary Refill Time:   Hair growth  Skin:    Edema:    Neurologic:      Musculoskeletal/ Orthopedic examination: Orthopedic examination of bilateral feet the right foot had minimal pain with palpation to the second and third metatarsal phalangeal joint mild numbness noted to distal digits. The left foot there was pain with palpation to the second metatarsal phalangeal joint second webspace. Full range of motion  NAIL   Web space  Derm:          Assessment and Plan: Today we reevaluated the arthritis I prescribed Voltaren gel up to 4 times on bilateral feet patient is to continue wearing custom inserts with a comfortable shoes. We will monitor patient does not have allergic reaction to meloxicam he is taking it now without any issues    Yoel Phillip was seen today for foot pain. Diagnoses and all orders for this visit:    Arthritis of foot    Pain in left toe(s)    Pain in toe of right foot    Metatarsalgia of both feet    Other orders  -     diclofenac sodium (VOLTAREN) 1 % GEL; Apply 4 g topically 4 times daily        No follow-ups on file. Seen By:  Zulema Mann DPM      Document was created using voice recognition software. Note was reviewed, however may contain grammatical errors.

## 2020-11-25 ENCOUNTER — OFFICE VISIT (OUTPATIENT)
Dept: PRIMARY CARE CLINIC | Age: 60
End: 2020-11-25
Payer: COMMERCIAL

## 2020-11-25 VITALS
SYSTOLIC BLOOD PRESSURE: 140 MMHG | BODY MASS INDEX: 30.76 KG/M2 | DIASTOLIC BLOOD PRESSURE: 70 MMHG | OXYGEN SATURATION: 97 % | TEMPERATURE: 97.1 F | HEART RATE: 84 BPM | HEIGHT: 66 IN | WEIGHT: 191.38 LBS

## 2020-11-25 PROCEDURE — 99213 OFFICE O/P EST LOW 20 MIN: CPT | Performed by: INTERNAL MEDICINE

## 2020-11-25 PROCEDURE — G8417 CALC BMI ABV UP PARAM F/U: HCPCS | Performed by: INTERNAL MEDICINE

## 2020-11-25 PROCEDURE — 4004F PT TOBACCO SCREEN RCVD TLK: CPT | Performed by: INTERNAL MEDICINE

## 2020-11-25 PROCEDURE — G8427 DOCREV CUR MEDS BY ELIG CLIN: HCPCS | Performed by: INTERNAL MEDICINE

## 2020-11-25 PROCEDURE — 3017F COLORECTAL CA SCREEN DOC REV: CPT | Performed by: INTERNAL MEDICINE

## 2020-11-25 PROCEDURE — G8484 FLU IMMUNIZE NO ADMIN: HCPCS | Performed by: INTERNAL MEDICINE

## 2020-11-25 RX ORDER — MELOXICAM 15 MG/1
TABLET ORAL
COMMUNITY
Start: 2020-09-27 | End: 2021-03-04

## 2020-11-25 NOTE — PROGRESS NOTES
rhonchi  Heart: regular rate and rhythm, S1, S2 normal, no murmur, click, rub or gallop  Abdomen: soft, non-tender; bowel sounds normal; no masses, no organomegaly  Extremities: atraumatic, no cyanosis, no edema  Skin: color, texture, turgor within normal limits. psoriasis plaques to hands and upper extremities bilaterally  Neurologic:speech appropriate, moves all 4 extremities, CN 2-12 grossly intact    Labs:  Lab Results   Component Value Date    WBC 4.6 10/11/2018    HGB 12.8 10/11/2018    HCT 36.2 (L) 10/11/2018     10/11/2018     08/20/2020    K 3.8 08/20/2020     08/20/2020    CREATININE 1.2 08/20/2020    BUN 18 08/20/2020    CO2 24 08/20/2020    GLUCOSE 112 (H) 08/20/2020    ALT 15 08/20/2020    AST 16 08/20/2020    INR 1.0 06/04/2012     No results found for: TSH  Lab Results   Component Value Date    TRIG 108 08/20/2020    TRIG 111 05/20/2020    TRIG 79 01/18/2020     Lab Results   Component Value Date    HDL 36 08/20/2020    HDL 34 05/20/2020    HDL 36 01/18/2020     Lab Results   Component Value Date    LDLCALC 110 (H) 08/20/2020    LDLCALC 117 (H) 05/20/2020    LDLCALC 122 01/18/2020     Lab Results   Component Value Date    LABA1C 6.1 (H) 08/20/2020     Lab Results   Component Value Date    INR 1.0 06/04/2012    PROTIME 11.2 06/04/2012      *All recent labs were reviewed. Please see electronic chart for a more comprehensive set of labs    Radiology:  No results found. Assessment and Plan     Patient is a 56 y.o. male who presented to the office for follow up. Full problem list is as follows:  Patient Active Problem List   Diagnosis    Chest pain    Hypertension    Hyperlipidemia    Psoriasis    Impaired fasting glucose    Cigarette nicotine dependence without complication    Dermatitis    Skin infection    Chronic pain of left knee    Metatarsalgia of both feet    Abscess of axilla, right       Mg was seen today for hypertension.     Diagnoses and all orders for this visit:    Psoriasis    Essential hypertension    Hyperlipidemia, unspecified hyperlipidemia type    Patient instructed to have his lab work that was previously ordered completed before his next visit. Educational materials and/or home exercises printed for patient's review and were included in patient instructions on his/her After Visit Summary and given to patient at the end of visit. Counseled regarding above diagnosis, including possible risks and complications,  especially if left uncontrolled. Counseled regarding the possible side effects, risks, benefits and alternatives to treatment; patient and/or guardian verbalizes understanding, agrees,feels comfortable with and wishes to proceed with above treatment plan. Advised patient to call [facility]. new medication issues, and read all Rx info from pharmacy to assure aware of all possible risks and side effects of any medication before taking. Reviewed age and gender appropriate health screening exams and vaccinations. Advised patient regarding importance of keeping up with recommended health maintenance and to schedule as soon as possible if overdue, as this is important in assessing for undiagnosed pathology, especially cancer, as well as protecting against potentially harmful/life threatening disease. Patient verbalizes understanding and agrees with above counseling, assessment and plan. All questions answered.     Cara Crenshaw DO   11/25/2020  4:17 PM

## 2020-12-04 RX ORDER — METFORMIN HYDROCHLORIDE 500 MG/1
TABLET, EXTENDED RELEASE ORAL
Qty: 90 TABLET | Refills: 3 | Status: SHIPPED
Start: 2020-12-04 | End: 2021-12-27

## 2020-12-21 RX ORDER — PRAVASTATIN SODIUM 40 MG
40 TABLET ORAL DAILY
Qty: 90 TABLET | Refills: 3 | Status: SHIPPED
Start: 2020-12-21 | End: 2022-01-10

## 2020-12-21 RX ORDER — AMLODIPINE BESYLATE 5 MG/1
5 TABLET ORAL DAILY
Qty: 90 TABLET | Refills: 3 | Status: SHIPPED
Start: 2020-12-21 | End: 2022-01-10

## 2021-02-28 LAB
ALBUMIN SERPL-MCNC: 4.4 G/DL
ALP BLD-CCNC: 73 U/L
ALT SERPL-CCNC: 19 U/L
ANION GAP SERPL CALCULATED.3IONS-SCNC: NORMAL MMOL/L
AST SERPL-CCNC: 14 U/L
AVERAGE GLUCOSE: NORMAL
BILIRUB SERPL-MCNC: 0.6 MG/DL (ref 0.1–1.4)
BUN BLDV-MCNC: 15 MG/DL
CALCIUM SERPL-MCNC: 9.5 MG/DL
CHLORIDE BLD-SCNC: 106 MMOL/L
CHOLESTEROL, TOTAL: 184 MG/DL
CHOLESTEROL/HDL RATIO: 4.7
CO2: 30 MMOL/L
CREAT SERPL-MCNC: 0.86 MG/DL
GFR CALCULATED: 93
GLUCOSE BLD-MCNC: 128 MG/DL
HBA1C MFR BLD: 6 %
HDLC SERPL-MCNC: 39 MG/DL (ref 35–70)
LDL CHOLESTEROL CALCULATED: 128 MG/DL (ref 0–160)
NONHDLC SERPL-MCNC: 145 MG/DL
POTASSIUM SERPL-SCNC: 4 MMOL/L
SODIUM BLD-SCNC: 141 MMOL/L
TOTAL PROTEIN: 6.9
TRIGL SERPL-MCNC: 80 MG/DL
VLDLC SERPL CALC-MCNC: NORMAL MG/DL

## 2021-03-04 ENCOUNTER — OFFICE VISIT (OUTPATIENT)
Dept: PRIMARY CARE CLINIC | Age: 61
End: 2021-03-04
Payer: COMMERCIAL

## 2021-03-04 VITALS
BODY MASS INDEX: 32.28 KG/M2 | SYSTOLIC BLOOD PRESSURE: 130 MMHG | HEART RATE: 57 BPM | TEMPERATURE: 98.2 F | DIASTOLIC BLOOD PRESSURE: 80 MMHG | WEIGHT: 200 LBS | OXYGEN SATURATION: 93 %

## 2021-03-04 DIAGNOSIS — R73.01 IMPAIRED FASTING GLUCOSE: ICD-10-CM

## 2021-03-04 DIAGNOSIS — E78.5 HYPERLIPIDEMIA, UNSPECIFIED HYPERLIPIDEMIA TYPE: ICD-10-CM

## 2021-03-04 DIAGNOSIS — I10 ESSENTIAL HYPERTENSION: ICD-10-CM

## 2021-03-04 DIAGNOSIS — I10 ESSENTIAL HYPERTENSION: Primary | ICD-10-CM

## 2021-03-04 DIAGNOSIS — L40.9 PSORIASIS: ICD-10-CM

## 2021-03-04 PROCEDURE — 99214 OFFICE O/P EST MOD 30 MIN: CPT | Performed by: FAMILY MEDICINE

## 2021-03-04 PROCEDURE — 4004F PT TOBACCO SCREEN RCVD TLK: CPT | Performed by: FAMILY MEDICINE

## 2021-03-04 PROCEDURE — 3017F COLORECTAL CA SCREEN DOC REV: CPT | Performed by: FAMILY MEDICINE

## 2021-03-04 PROCEDURE — G8427 DOCREV CUR MEDS BY ELIG CLIN: HCPCS | Performed by: FAMILY MEDICINE

## 2021-03-04 PROCEDURE — G8417 CALC BMI ABV UP PARAM F/U: HCPCS | Performed by: FAMILY MEDICINE

## 2021-03-04 PROCEDURE — G8484 FLU IMMUNIZE NO ADMIN: HCPCS | Performed by: FAMILY MEDICINE

## 2021-03-04 RX ORDER — HYDROXYZINE HYDROCHLORIDE 10 MG/1
TABLET, FILM COATED ORAL
COMMUNITY
Start: 2021-01-28

## 2021-03-04 RX ORDER — HYDROCHLOROTHIAZIDE 25 MG/1
25 TABLET ORAL EVERY MORNING
Qty: 90 TABLET | Refills: 3 | Status: SHIPPED
Start: 2021-03-04 | End: 2022-03-21

## 2021-03-04 ASSESSMENT — ENCOUNTER SYMPTOMS
ALLERGIC/IMMUNOLOGIC NEGATIVE: 1
GASTROINTESTINAL NEGATIVE: 1
EYES NEGATIVE: 1
RESPIRATORY NEGATIVE: 1

## 2021-03-04 ASSESSMENT — PATIENT HEALTH QUESTIONNAIRE - PHQ9
SUM OF ALL RESPONSES TO PHQ QUESTIONS 1-9: 0
SUM OF ALL RESPONSES TO PHQ9 QUESTIONS 1 & 2: 0
SUM OF ALL RESPONSES TO PHQ QUESTIONS 1-9: 0

## 2021-03-04 NOTE — PROGRESS NOTES
3/4/21     Mg Jaffe    : 1960 Sex: male   Age: 56 y.o. Chief Complaint   Patient presents with    Discuss Labs    Hypertension       Prior to Admission medications    Medication Sig Start Date End Date Taking? Authorizing Provider   hydrOXYzine (ATARAX) 10 MG tablet take 1 tablet by mouth four times a day if needed 21  Yes Historical Provider, MD   hydroCHLOROthiazide (HYDRODIURIL) 25 MG tablet Take 1 tablet by mouth every morning 3/4/21  Yes Tessa Rader DO   pravastatin (PRAVACHOL) 40 MG tablet Take 1 tablet by mouth daily 20  Yes Tessa Rader DO   amLODIPine (NORVASC) 5 MG tablet Take 1 tablet by mouth daily 1 tablet daily 20  Yes Tessa Rader DO   metFORMIN (GLUCOPHAGE-XR) 500 MG extended release tablet take 1 tablet by mouth once daily with food 20  Yes Tessa Rader DO   diclofenac sodium (VOLTAREN) 1 % GEL Apply 4 g topically 4 times daily 20  Yes Carolyn Albright DPM   triamcinolone (KENALOG) 0.1 % cream Apply topically 2 times daily. 20  Yes LEONARDO Rhoades   desoximetasone (TOPICORT) 0.25 % cream Apply topically 2 times daily. 20  Yes Ravinder Rader DO   adalimumab (HUMIRA PEN) 40 MG/0.8ML injection Inject into the skin   Yes Historical Provider, MD   sildenafil (REVATIO) 20 MG tablet 2-3 tabs as needed   Yes Historical Provider, MD   meloxicam (MOBIC) 15 MG tablet take 1 tablet by mouth once daily 20   Historical Provider, MD          HPI: Patient seen today hypertension hyperlipidemia impaired fasting glucose psoriasis all stable current meds. Review all stable          Review of Systems   Constitutional: Negative. HENT: Negative. Eyes: Negative. Respiratory: Negative. Gastrointestinal: Negative. Endocrine: Negative. Genitourinary: Negative. Musculoskeletal: Negative. Skin: Negative. Allergic/Immunologic: Negative. Neurological: Negative. Hematological: Negative. Psychiatric/Behavioral: Negative. Current Outpatient Medications:     hydrOXYzine (ATARAX) 10 MG tablet, take 1 tablet by mouth four times a day if needed, Disp: , Rfl:     hydroCHLOROthiazide (HYDRODIURIL) 25 MG tablet, Take 1 tablet by mouth every morning, Disp: 90 tablet, Rfl: 3    pravastatin (PRAVACHOL) 40 MG tablet, Take 1 tablet by mouth daily, Disp: 90 tablet, Rfl: 3    amLODIPine (NORVASC) 5 MG tablet, Take 1 tablet by mouth daily 1 tablet daily, Disp: 90 tablet, Rfl: 3    metFORMIN (GLUCOPHAGE-XR) 500 MG extended release tablet, take 1 tablet by mouth once daily with food, Disp: 90 tablet, Rfl: 3    diclofenac sodium (VOLTAREN) 1 % GEL, Apply 4 g topically 4 times daily, Disp: 4 Tube, Rfl: 1    triamcinolone (KENALOG) 0.1 % cream, Apply topically 2 times daily. , Disp: 80 g, Rfl: 1    desoximetasone (TOPICORT) 0.25 % cream, Apply topically 2 times daily. , Disp: 30 g, Rfl: 1    adalimumab (HUMIRA PEN) 40 MG/0.8ML injection, Inject into the skin, Disp: , Rfl:     sildenafil (REVATIO) 20 MG tablet, 2-3 tabs as needed, Disp: , Rfl:     meloxicam (MOBIC) 15 MG tablet, take 1 tablet by mouth once daily, Disp: , Rfl:     Allergies   Allergen Reactions    Mobic [Meloxicam] Hives       Social History     Tobacco Use    Smoking status: Heavy Tobacco Smoker     Packs/day: 1.50     Years: 30.00     Pack years: 45.00     Types: Cigarettes    Smokeless tobacco: Never Used   Substance Use Topics    Alcohol use: Yes     Comment: beer     Drug use: No      Past Surgical History:   Procedure Laterality Date    COLONOSCOPY  12/05/2016    JOINT REPLACEMENT Left     knee    KNEE ARTHROPLASTY       Family History   Problem Relation Age of Onset    Heart Disease Mother     Cancer Mother         Brain    Cancer Father         colon, Pancreatic    Heart Attack Father     Diabetes Father      Past Medical History:   Diagnosis Date    Abscess     Hyperlipidemia     Hypertension     Impaired fasting glucose     Psoriasis        Vitals:    03/04/21 1645   BP: 130/80   Pulse: 57   Temp: 98.2 °F (36.8 °C)   SpO2: 93%   Weight: 200 lb (90.7 kg)     BP Readings from Last 3 Encounters:   03/04/21 130/80   11/25/20 (!) 140/70   09/19/20 136/82        Physical Exam  Vitals signs and nursing note reviewed. Constitutional:       Appearance: He is well-developed. HENT:      Head: Normocephalic. Right Ear: External ear normal.      Left Ear: External ear normal.      Nose: Nose normal.   Eyes:      Conjunctiva/sclera: Conjunctivae normal.      Pupils: Pupils are equal, round, and reactive to light. Neck:      Musculoskeletal: Normal range of motion and neck supple. Cardiovascular:      Rate and Rhythm: Normal rate. Pulmonary:      Breath sounds: Normal breath sounds. Abdominal:      General: Bowel sounds are normal.      Palpations: Abdomen is soft. Musculoskeletal: Normal range of motion. Skin:     General: Skin is warm and dry. Neurological:      Mental Status: He is alert and oriented to person, place, and time. Psychiatric:         Behavior: Behavior normal.     Vitals physical examination stable. I will maintain present meds and care. Reassessment with me 3 months blood work again at that time. Covid vaccination discussed and he will have that completed in the near future. Plan Per Assessment:  Latanya Padilla was seen today for discuss labs and hypertension. Diagnoses and all orders for this visit:    Essential hypertension  -     Comprehensive Metabolic Panel; Future  -     Hemoglobin A1C; Future  -     Lipid Panel; Future    Hyperlipidemia, unspecified hyperlipidemia type  -     Comprehensive Metabolic Panel; Future  -     Hemoglobin A1C; Future  -     Lipid Panel; Future    Impaired fasting glucose  -     Comprehensive Metabolic Panel; Future  -     Hemoglobin A1C; Future  -     Lipid Panel;  Future    Psoriasis    Other orders  -     hydroCHLOROthiazide (HYDRODIURIL) 25 MG tablet; Take 1 tablet by mouth every morning            Return in about 3 months (around 6/4/2021). Chante Petersen DO    Note was generated with the assistance of voice recognition software. Document was reviewed however may contain grammatical errors.

## 2021-06-05 ENCOUNTER — HOSPITAL ENCOUNTER (OUTPATIENT)
Age: 61
Discharge: HOME | End: 2021-06-05
Payer: COMMERCIAL

## 2021-06-05 DIAGNOSIS — R73.01 IMPAIRED FASTING GLUCOSE: ICD-10-CM

## 2021-06-05 DIAGNOSIS — I10 ESSENTIAL HYPERTENSION: ICD-10-CM

## 2021-06-05 DIAGNOSIS — E78.5 HYPERLIPIDEMIA, UNSPECIFIED HYPERLIPIDEMIA TYPE: ICD-10-CM

## 2021-06-05 LAB
ALBUMIN SERPL-MCNC: 4.2 G/DL (ref 3.5–5.2)
ALP BLD-CCNC: 88 U/L (ref 40–129)
ALT SERPL-CCNC: 18 U/L (ref 0–40)
ANION GAP SERPL CALCULATED.3IONS-SCNC: 9 MMOL/L (ref 7–16)
AST SERPL-CCNC: 15 U/L (ref 0–39)
BILIRUB SERPL-MCNC: 0.3 MG/DL (ref 0–1.2)
BUN BLDV-MCNC: 16 MG/DL (ref 6–23)
CALCIUM SERPL-MCNC: 9.4 MG/DL (ref 8.6–10.2)
CHLORIDE BLD-SCNC: 101 MMOL/L (ref 98–107)
CHOLESTEROL, TOTAL: 163 MG/DL (ref 0–199)
CO2: 30 MMOL/L (ref 22–29)
CREAT SERPL-MCNC: 0.9 MG/DL (ref 0.7–1.2)
GFR AFRICAN AMERICAN: >60
GFR NON-AFRICAN AMERICAN: >60 ML/MIN/1.73
GLUCOSE BLD-MCNC: 134 MG/DL (ref 74–99)
HBA1C MFR BLD: 6 % (ref 4–5.6)
HDLC SERPL-MCNC: 32 MG/DL
LDL CHOLESTEROL CALCULATED: 113 MG/DL (ref 0–99)
POTASSIUM SERPL-SCNC: 4.3 MMOL/L (ref 3.5–5)
SODIUM BLD-SCNC: 140 MMOL/L (ref 132–146)
TOTAL PROTEIN: 7.2 G/DL (ref 6.4–8.3)
TRIGL SERPL-MCNC: 91 MG/DL (ref 0–149)
VLDLC SERPL CALC-MCNC: 18 MG/DL

## 2021-06-05 PROCEDURE — 36415 COLL VENOUS BLD VENIPUNCTURE: CPT

## 2021-06-05 PROCEDURE — 80061 LIPID PANEL: CPT

## 2021-06-05 PROCEDURE — 83036 HEMOGLOBIN GLYCOSYLATED A1C: CPT

## 2021-06-05 PROCEDURE — 80053 COMPREHEN METABOLIC PANEL: CPT

## 2021-06-08 ENCOUNTER — OFFICE VISIT (OUTPATIENT)
Dept: PRIMARY CARE CLINIC | Age: 61
End: 2021-06-08
Payer: COMMERCIAL

## 2021-06-08 VITALS
BODY MASS INDEX: 31.5 KG/M2 | TEMPERATURE: 98.7 F | HEIGHT: 66 IN | DIASTOLIC BLOOD PRESSURE: 82 MMHG | SYSTOLIC BLOOD PRESSURE: 138 MMHG | HEART RATE: 96 BPM | OXYGEN SATURATION: 96 % | RESPIRATION RATE: 16 BRPM | WEIGHT: 196 LBS

## 2021-06-08 DIAGNOSIS — R73.01 IMPAIRED FASTING GLUCOSE: ICD-10-CM

## 2021-06-08 DIAGNOSIS — E78.5 HYPERLIPIDEMIA, UNSPECIFIED HYPERLIPIDEMIA TYPE: ICD-10-CM

## 2021-06-08 DIAGNOSIS — M25.552 HIP PAIN, LEFT: ICD-10-CM

## 2021-06-08 DIAGNOSIS — F17.210 CIGARETTE NICOTINE DEPENDENCE WITHOUT COMPLICATION: ICD-10-CM

## 2021-06-08 DIAGNOSIS — L40.9 PSORIASIS: ICD-10-CM

## 2021-06-08 DIAGNOSIS — I10 ESSENTIAL HYPERTENSION: Primary | ICD-10-CM

## 2021-06-08 PROCEDURE — G8427 DOCREV CUR MEDS BY ELIG CLIN: HCPCS | Performed by: FAMILY MEDICINE

## 2021-06-08 PROCEDURE — G8417 CALC BMI ABV UP PARAM F/U: HCPCS | Performed by: FAMILY MEDICINE

## 2021-06-08 PROCEDURE — 4004F PT TOBACCO SCREEN RCVD TLK: CPT | Performed by: FAMILY MEDICINE

## 2021-06-08 PROCEDURE — 3017F COLORECTAL CA SCREEN DOC REV: CPT | Performed by: FAMILY MEDICINE

## 2021-06-08 PROCEDURE — 99214 OFFICE O/P EST MOD 30 MIN: CPT | Performed by: FAMILY MEDICINE

## 2021-06-08 RX ORDER — METHYLPREDNISOLONE 4 MG/1
TABLET ORAL
Qty: 21 TABLET | Refills: 0 | Status: SHIPPED
Start: 2021-06-08 | End: 2021-06-15 | Stop reason: CLARIF

## 2021-06-08 ASSESSMENT — ENCOUNTER SYMPTOMS
RESPIRATORY NEGATIVE: 1
ALLERGIC/IMMUNOLOGIC NEGATIVE: 1
EYES NEGATIVE: 1
GASTROINTESTINAL NEGATIVE: 1

## 2021-06-15 ENCOUNTER — OFFICE VISIT (OUTPATIENT)
Dept: PRIMARY CARE CLINIC | Age: 61
End: 2021-06-15
Payer: COMMERCIAL

## 2021-06-15 VITALS — OXYGEN SATURATION: 96 % | SYSTOLIC BLOOD PRESSURE: 126 MMHG | DIASTOLIC BLOOD PRESSURE: 78 MMHG | HEART RATE: 83 BPM

## 2021-06-15 DIAGNOSIS — I10 ESSENTIAL HYPERTENSION: ICD-10-CM

## 2021-06-15 DIAGNOSIS — E78.5 HYPERLIPIDEMIA, UNSPECIFIED HYPERLIPIDEMIA TYPE: ICD-10-CM

## 2021-06-15 DIAGNOSIS — M25.552 HIP PAIN, LEFT: Primary | ICD-10-CM

## 2021-06-15 DIAGNOSIS — R73.01 IMPAIRED FASTING GLUCOSE: ICD-10-CM

## 2021-06-15 PROCEDURE — 99214 OFFICE O/P EST MOD 30 MIN: CPT | Performed by: FAMILY MEDICINE

## 2021-06-15 PROCEDURE — 4004F PT TOBACCO SCREEN RCVD TLK: CPT | Performed by: FAMILY MEDICINE

## 2021-06-15 PROCEDURE — G8427 DOCREV CUR MEDS BY ELIG CLIN: HCPCS | Performed by: FAMILY MEDICINE

## 2021-06-15 PROCEDURE — 3017F COLORECTAL CA SCREEN DOC REV: CPT | Performed by: FAMILY MEDICINE

## 2021-06-15 PROCEDURE — G8417 CALC BMI ABV UP PARAM F/U: HCPCS | Performed by: FAMILY MEDICINE

## 2021-06-15 ASSESSMENT — ENCOUNTER SYMPTOMS
EYES NEGATIVE: 1
RESPIRATORY NEGATIVE: 1
GASTROINTESTINAL NEGATIVE: 1
ALLERGIC/IMMUNOLOGIC NEGATIVE: 1

## 2021-06-15 NOTE — PROGRESS NOTES
6/15/21     Mg Jaffe    : 1960 Sex: male   Age: 56 y.o. Chief Complaint   Patient presents with    Hip Pain     did not have xrays completed yet, finishing medrol pedro today       Prior to Admission medications    Medication Sig Start Date End Date Taking? Authorizing Provider   hydrOXYzine (ATARAX) 10 MG tablet take 1 tablet by mouth four times a day if needed 21  Yes Historical Provider, MD   hydroCHLOROthiazide (HYDRODIURIL) 25 MG tablet Take 1 tablet by mouth every morning 3/4/21  Yes Anat Rader DO   pravastatin (PRAVACHOL) 40 MG tablet Take 1 tablet by mouth daily 20  Yes Anat Rader DO   amLODIPine (NORVASC) 5 MG tablet Take 1 tablet by mouth daily 1 tablet daily 20  Yes Anat Rader DO   metFORMIN (GLUCOPHAGE-XR) 500 MG extended release tablet take 1 tablet by mouth once daily with food 20  Yes Anat Rader DO   diclofenac sodium (VOLTAREN) 1 % GEL Apply 4 g topically 4 times daily 20  Yes Saima Lee DPM   triamcinolone (KENALOG) 0.1 % cream Apply topically 2 times daily. 20  Yes LEONARDO Ko   desoximetasone (TOPICORT) 0.25 % cream Apply topically 2 times daily. 20  Yes Ravinder Rader DO   adalimumab (HUMIRA PEN) 40 MG/0.8ML injection Inject into the skin   Yes Historical Provider, MD   sildenafil (REVATIO) 20 MG tablet 2-3 tabs as needed   Yes Historical Provider, MD          HPI: Patient seen today in follow-up hyperlipidemia hypertension impaired fasting glucose. Left hip pain has improved with a Medrol pack. X-ray will be completed today of both hips. Reassessment with me if further problems otherwise 3 months. Medications to continue as prescribed. Review of Systems   Constitutional: Negative. HENT: Negative. Eyes: Negative. Respiratory: Negative. Gastrointestinal: Negative. Endocrine: Negative. Genitourinary: Negative. Musculoskeletal: Positive for arthralgias.    Skin: Negative. Allergic/Immunologic: Negative. Neurological: Negative. Hematological: Negative. Psychiatric/Behavioral: Negative. Current Outpatient Medications:     hydrOXYzine (ATARAX) 10 MG tablet, take 1 tablet by mouth four times a day if needed, Disp: , Rfl:     hydroCHLOROthiazide (HYDRODIURIL) 25 MG tablet, Take 1 tablet by mouth every morning, Disp: 90 tablet, Rfl: 3    pravastatin (PRAVACHOL) 40 MG tablet, Take 1 tablet by mouth daily, Disp: 90 tablet, Rfl: 3    amLODIPine (NORVASC) 5 MG tablet, Take 1 tablet by mouth daily 1 tablet daily, Disp: 90 tablet, Rfl: 3    metFORMIN (GLUCOPHAGE-XR) 500 MG extended release tablet, take 1 tablet by mouth once daily with food, Disp: 90 tablet, Rfl: 3    diclofenac sodium (VOLTAREN) 1 % GEL, Apply 4 g topically 4 times daily, Disp: 4 Tube, Rfl: 1    triamcinolone (KENALOG) 0.1 % cream, Apply topically 2 times daily. , Disp: 80 g, Rfl: 1    desoximetasone (TOPICORT) 0.25 % cream, Apply topically 2 times daily. , Disp: 30 g, Rfl: 1    adalimumab (HUMIRA PEN) 40 MG/0.8ML injection, Inject into the skin, Disp: , Rfl:     sildenafil (REVATIO) 20 MG tablet, 2-3 tabs as needed, Disp: , Rfl:     Allergies   Allergen Reactions    Mobic [Meloxicam] Hives       Social History     Tobacco Use    Smoking status: Heavy Tobacco Smoker     Packs/day: 1.50     Years: 30.00     Pack years: 45.00     Types: Cigarettes    Smokeless tobacco: Never Used   Substance Use Topics    Alcohol use: Yes     Comment: beer     Drug use: No      Past Surgical History:   Procedure Laterality Date    COLONOSCOPY  12/05/2016    JOINT REPLACEMENT Left     knee    KNEE ARTHROPLASTY       Family History   Problem Relation Age of Onset    Heart Disease Mother     Cancer Mother         Brain    Cancer Father         colon, Pancreatic    Heart Attack Father     Diabetes Father      Past Medical History:   Diagnosis Date    Abscess     Hyperlipidemia     Hypertension     Impaired fasting glucose     Psoriasis        Vitals:    06/15/21 1616   BP: 126/78   Pulse: 83   SpO2: 96%     BP Readings from Last 3 Encounters:   06/15/21 126/78   06/08/21 138/82   03/04/21 130/80        Physical Exam  Vitals and nursing note reviewed. Constitutional:       Appearance: He is well-developed. HENT:      Head: Normocephalic. Right Ear: External ear normal.      Left Ear: External ear normal.      Nose: Nose normal.   Eyes:      Conjunctiva/sclera: Conjunctivae normal.      Pupils: Pupils are equal, round, and reactive to light. Cardiovascular:      Rate and Rhythm: Normal rate. Pulmonary:      Breath sounds: Normal breath sounds. Abdominal:      General: Bowel sounds are normal.      Palpations: Abdomen is soft. Musculoskeletal:         General: Normal range of motion. Cervical back: Normal range of motion and neck supple. Skin:     General: Skin is warm and dry. Neurological:      Mental Status: He is alert and oriented to person, place, and time. Psychiatric:         Behavior: Behavior normal.     Today's vitals physical examination stable. Maintain current meds and care. Reassessment 3 months and sooner if problems. X-rays will be followed up and reviewed by phone.     No results found for: TSH, R3VORXW, F4XCIOK, THYROIDAB, FT3, T4FREE  Lab Results   Component Value Date    CHOL 163 06/05/2021    CHOL 184 02/27/2021     Lab Results   Component Value Date    TRIG 91 06/05/2021    TRIG 80 02/27/2021     Lab Results   Component Value Date    HDL 32 06/05/2021    HDL 39 02/27/2021     No results found for: TIMOTHY [facility]  Lab Results   Component Value Date    LABVLDL 18 06/05/2021    LABVLDL 22 08/20/2020     Lab Results   Component Value Date    CHOLHDLRATIO 4.7 02/27/2021    CHOLHDLRATIO 4.9 01/18/2020     Lab Results   Component Value Date    WBC 4.6 10/11/2018    HGB 12.8 10/11/2018    HCT 36.2 (L) 10/11/2018    MCV 87.2 10/11/2018     10/11/2018    LYMPHOPCT 22.1 10/11/2018    RBC 4.15 10/11/2018    MCH 30.8 10/11/2018    MCHC 35.4 (H) 10/11/2018    RDW 13.7 10/11/2018     Lab Results   Component Value Date     06/05/2021    K 4.3 06/05/2021     06/05/2021    CO2 30 (H) 06/05/2021    BUN 16 06/05/2021    CREATININE 0.9 06/05/2021    GLUCOSE 134 (H) 06/05/2021    CALCIUM 9.4 06/05/2021    PROT 7.2 06/05/2021    LABALBU 4.2 06/05/2021    BILITOT 0.3 06/05/2021    ALKPHOS 88 06/05/2021    AST 15 06/05/2021    ALT 18 06/05/2021    LABGLOM >60 06/05/2021    GFRAA >60 06/05/2021      No results found for: PSA, PSADIA   Lab Results   Component Value Date    LABA1C 6.0 (H) 06/05/2021    LABA1C 6.0 02/27/2021    LABA1C 6.1 (H) 08/20/2020     No results found for: EAG       Plan Per Assessment:  There are no diagnoses linked to this encounter. No follow-ups on file. Christopherjameel Del Torolding, DO    Note was generated with the assistance of voice recognition software. Document was reviewed however may contain grammatical errors.

## 2021-09-16 ENCOUNTER — OFFICE VISIT (OUTPATIENT)
Dept: PRIMARY CARE CLINIC | Age: 61
End: 2021-09-16
Payer: COMMERCIAL

## 2021-09-16 ENCOUNTER — HOSPITAL ENCOUNTER (OUTPATIENT)
Age: 61
Discharge: HOME | End: 2021-09-16
Payer: COMMERCIAL

## 2021-09-16 VITALS
OXYGEN SATURATION: 98 % | WEIGHT: 191 LBS | HEART RATE: 90 BPM | BODY MASS INDEX: 30.83 KG/M2 | SYSTOLIC BLOOD PRESSURE: 138 MMHG | DIASTOLIC BLOOD PRESSURE: 76 MMHG

## 2021-09-16 DIAGNOSIS — I10 ESSENTIAL HYPERTENSION: ICD-10-CM

## 2021-09-16 DIAGNOSIS — L40.9 PSORIASIS: ICD-10-CM

## 2021-09-16 DIAGNOSIS — I10 ESSENTIAL HYPERTENSION: Primary | ICD-10-CM

## 2021-09-16 DIAGNOSIS — E78.5 HYPERLIPIDEMIA, UNSPECIFIED HYPERLIPIDEMIA TYPE: ICD-10-CM

## 2021-09-16 DIAGNOSIS — F17.210 CIGARETTE NICOTINE DEPENDENCE WITHOUT COMPLICATION: ICD-10-CM

## 2021-09-16 DIAGNOSIS — R73.01 IMPAIRED FASTING GLUCOSE: ICD-10-CM

## 2021-09-16 LAB
ALBUMIN SERPL-MCNC: 4.2 G/DL (ref 3.5–5.2)
ALP BLD-CCNC: 93 U/L (ref 40–129)
ALT SERPL-CCNC: 45 U/L (ref 0–40)
ANION GAP SERPL CALCULATED.3IONS-SCNC: 9 MMOL/L (ref 7–16)
AST SERPL-CCNC: 27 U/L (ref 0–39)
BILIRUB SERPL-MCNC: 0.4 MG/DL (ref 0–1.2)
BUN BLDV-MCNC: 15 MG/DL (ref 6–23)
CALCIUM SERPL-MCNC: 9.4 MG/DL (ref 8.6–10.2)
CHLORIDE BLD-SCNC: 101 MMOL/L (ref 98–107)
CHOLESTEROL, TOTAL: 149 MG/DL (ref 0–199)
CO2: 27 MMOL/L (ref 22–29)
CREAT SERPL-MCNC: 0.9 MG/DL (ref 0.7–1.2)
GFR AFRICAN AMERICAN: >60
GFR NON-AFRICAN AMERICAN: >60 ML/MIN/1.73
GLUCOSE BLD-MCNC: 138 MG/DL (ref 74–99)
HBA1C MFR BLD: 6 % (ref 4–5.6)
HDLC SERPL-MCNC: 32 MG/DL
LDL CHOLESTEROL CALCULATED: 100 MG/DL (ref 0–99)
POTASSIUM SERPL-SCNC: 4.2 MMOL/L (ref 3.5–5)
SODIUM BLD-SCNC: 137 MMOL/L (ref 132–146)
TOTAL PROTEIN: 7.1 G/DL (ref 6.4–8.3)
TRIGL SERPL-MCNC: 87 MG/DL (ref 0–149)
VLDLC SERPL CALC-MCNC: 17 MG/DL

## 2021-09-16 PROCEDURE — G8417 CALC BMI ABV UP PARAM F/U: HCPCS | Performed by: FAMILY MEDICINE

## 2021-09-16 PROCEDURE — 3017F COLORECTAL CA SCREEN DOC REV: CPT | Performed by: FAMILY MEDICINE

## 2021-09-16 PROCEDURE — 99214 OFFICE O/P EST MOD 30 MIN: CPT | Performed by: FAMILY MEDICINE

## 2021-09-16 PROCEDURE — 80061 LIPID PANEL: CPT

## 2021-09-16 PROCEDURE — 36415 COLL VENOUS BLD VENIPUNCTURE: CPT

## 2021-09-16 PROCEDURE — 83036 HEMOGLOBIN GLYCOSYLATED A1C: CPT

## 2021-09-16 PROCEDURE — 4004F PT TOBACCO SCREEN RCVD TLK: CPT | Performed by: FAMILY MEDICINE

## 2021-09-16 PROCEDURE — G8427 DOCREV CUR MEDS BY ELIG CLIN: HCPCS | Performed by: FAMILY MEDICINE

## 2021-09-16 PROCEDURE — 80053 COMPREHEN METABOLIC PANEL: CPT

## 2021-09-16 RX ORDER — GUSELKUMAB 100 MG/ML
INJECTION SUBCUTANEOUS
COMMUNITY
Start: 2021-09-13 | End: 2022-08-05

## 2021-09-16 ASSESSMENT — ENCOUNTER SYMPTOMS
RESPIRATORY NEGATIVE: 1
GASTROINTESTINAL NEGATIVE: 1
EYES NEGATIVE: 1
ALLERGIC/IMMUNOLOGIC NEGATIVE: 1

## 2021-09-16 NOTE — PROGRESS NOTES
21     Mg Jaffe    : 1960 Sex: male   Age: 56 y.o. Chief Complaint   Patient presents with   [address]       Prior to Admission medications    Medication Sig Start Date End Date Taking? Authorizing Provider   TREMFYA 100 MG/ML SOPN INJECT 100MG UNDER THE SKIN AT WEEK 0, WEEK 4, AND EVERY 8 WEEKS THEREAFTER 21  Yes Historical Provider, MD   hydrOXYzine (ATARAX) 10 MG tablet take 1 tablet by mouth four times a day if needed 21  Yes Historical Provider, MD   hydroCHLOROthiazide (HYDRODIURIL) 25 MG tablet Take 1 tablet by mouth every morning 3/4/21  Yes Real Sablopez Garvinoff, DO   pravastatin (PRAVACHOL) 40 MG tablet Take 1 tablet by mouth daily 20  Yes Real Sablopez Garvinoff, DO   amLODIPine (NORVASC) 5 MG tablet Take 1 tablet by mouth daily 1 tablet daily 20  Yes Real Sablopez Garvinoff, DO   metFORMIN (GLUCOPHAGE-XR) 500 MG extended release tablet take 1 tablet by mouth once daily with food 20  Yes Real Sabal Tramariaoff, DO   diclofenac sodium (VOLTAREN) 1 % GEL Apply 4 g topically 4 times daily 20  Yes Aleyda Cantu DPM   triamcinolone (KENALOG) 0.1 % cream Apply topically 2 times daily. 20  Yes LEONARDO Haas   desoximetasone (TOPICORT) 0.25 % cream Apply topically 2 times daily. 20  Yes Real Sablopez Garvinoff, DO   sildenafil (REVATIO) 20 MG tablet 2-3 tabs as needed   Yes Historical Provider, MD          HPI: Presents today hypertension impaired fasting glucose psoriasis hyperlipidemia. Medications all reviewed well-tolerated will be continued. Chronic smoker continue encourage off. Recently started on Tremfya and seems to be tolerating. Working with dermatology Dr. Will Sawyer of Systems   Constitutional: Negative. HENT: Negative. Eyes: Negative. Respiratory: Negative. Gastrointestinal: Negative. Endocrine: Negative. Genitourinary: Negative. Musculoskeletal: Negative. Skin: Negative. Allergic/Immunologic: Negative. Neurological: Negative. Hematological: Negative. Psychiatric/Behavioral: Negative. Systems reviewed today stable meds will continue as prescribed. Current Outpatient Medications:     TREMFYA 100 MG/ML SOPN, INJECT 100MG UNDER THE SKIN AT WEEK 0, WEEK 4, AND EVERY 8 WEEKS THEREAFTER, Disp: , Rfl:     hydrOXYzine (ATARAX) 10 MG tablet, take 1 tablet by mouth four times a day if needed, Disp: , Rfl:     hydroCHLOROthiazide (HYDRODIURIL) 25 MG tablet, Take 1 tablet by mouth every morning, Disp: 90 tablet, Rfl: 3    pravastatin (PRAVACHOL) 40 MG tablet, Take 1 tablet by mouth daily, Disp: 90 tablet, Rfl: 3    amLODIPine (NORVASC) 5 MG tablet, Take 1 tablet by mouth daily 1 tablet daily, Disp: 90 tablet, Rfl: 3    metFORMIN (GLUCOPHAGE-XR) 500 MG extended release tablet, take 1 tablet by mouth once daily with food, Disp: 90 tablet, Rfl: 3    diclofenac sodium (VOLTAREN) 1 % GEL, Apply 4 g topically 4 times daily, Disp: 4 Tube, Rfl: 1    triamcinolone (KENALOG) 0.1 % cream, Apply topically 2 times daily. , Disp: 80 g, Rfl: 1    desoximetasone (TOPICORT) 0.25 % cream, Apply topically 2 times daily. , Disp: 30 g, Rfl: 1    sildenafil (REVATIO) 20 MG tablet, 2-3 tabs as needed, Disp: , Rfl:     Allergies   Allergen Reactions    Mobic [Meloxicam] Hives       Social History     Tobacco Use    Smoking status: Heavy Tobacco Smoker     Packs/day: 1.50     Years: 30.00     Pack years: 45.00     Types: Cigarettes    Smokeless tobacco: Never Used   Substance Use Topics    Alcohol use: Yes     Comment: beer     Drug use: No      Past Surgical History:   Procedure Laterality Date    COLONOSCOPY  12/05/2016    JOINT REPLACEMENT Left     knee    KNEE ARTHROPLASTY       Family History   Problem Relation Age of Onset    Heart Disease Mother     Cancer Mother         Brain    Cancer Father         colon, Pancreatic    Heart Attack Father     Diabetes Father      Past Medical History:   Diagnosis Date  Abscess     Hyperlipidemia     Hypertension     Impaired fasting glucose     Psoriasis        Vitals:    09/16/21 1623   BP: 138/76   Pulse: 90   SpO2: 98%   Weight: 191 lb (86.6 kg)     BP Readings from Last 3 Encounters:   09/16/21 138/76   06/15/21 126/78   06/08/21 138/82    124/70    Physical Exam  Vitals and nursing note reviewed. Constitutional:       Appearance: He is well-developed. HENT:      Head: Normocephalic. Right Ear: External ear normal.      Left Ear: External ear normal.      Nose: Nose normal.   Eyes:      Conjunctiva/sclera: Conjunctivae normal.      Pupils: Pupils are equal, round, and reactive to light. Cardiovascular:      Rate and Rhythm: Normal rate. Pulmonary:      Breath sounds: Normal breath sounds. Abdominal:      General: Bowel sounds are normal.      Palpations: Abdomen is soft. Musculoskeletal:         General: Normal range of motion. Cervical back: Normal range of motion and neck supple. Skin:     General: Skin is warm and dry. Neurological:      Mental Status: He is alert and oriented to person, place, and time. Psychiatric:         Behavior: Behavior normal.        Blood pressures controlled. Medications as prescribed. Reassessment with me 3 months sooner if problems. Blood work at that time.       No results found for: TSH, M0KECWB, A8KPYVP, THYROIDAB, FT3, T4FREE  Lab Results   Component Value Date    CHOL 149 09/16/2021    CHOL 163 06/05/2021     Lab Results   Component Value Date    TRIG 87 09/16/2021    TRIG 91 06/05/2021     Lab Results   Component Value Date    HDL 32 09/16/2021    HDL 32 06/05/2021     No results found for: [facility]  Lab Results   Component Value Date    LABVLDL 17 09/16/2021    LABVLDL 18 06/05/2021     Lab Results   Component Value Date    CHOLHDLRATIO 4.7 02/27/2021    CHOLHDLRATIO 4.9 01/18/2020     Lab Results   Component Value Date    WBC 4.6 10/11/2018    HGB 12.8 10/11/2018    HCT 36.2 (L) 10/11/2018 MCV 87.2 10/11/2018     10/11/2018    LYMPHOPCT 22.1 10/11/2018    RBC 4.15 10/11/2018    MCH 30.8 10/11/2018    MCHC 35.4 (H) 10/11/2018    RDW 13.7 10/11/2018     Lab Results   Component Value Date     09/16/2021    K 4.2 09/16/2021     09/16/2021    CO2 27 09/16/2021    BUN 15 09/16/2021    CREATININE 0.9 09/16/2021    GLUCOSE 138 (H) 09/16/2021    CALCIUM 9.4 09/16/2021    PROT 7.1 09/16/2021    LABALBU 4.2 09/16/2021    BILITOT 0.4 09/16/2021    ALKPHOS 93 09/16/2021    AST 27 09/16/2021    ALT 45 (H) 09/16/2021    LABGLOM >60 09/16/2021    GFRAA >60 09/16/2021      No results found for: PSA, PSADIA   Lab Results   Component Value Date    LABA1C 6.0 (H) 09/16/2021    LABA1C 6.0 (H) 06/05/2021    LABA1C 6.0 02/27/2021     No results found for: EAG     Plan Per Assessment:  Marcelino Tom was seen today for discuss labs. Diagnoses and all orders for this visit:    Essential hypertension    Impaired fasting glucose    Psoriasis    Hyperlipidemia, unspecified hyperlipidemia type            No follow-ups on file. Brandon April, DO    Note was generated with the assistance of voice recognition software. Document was reviewed however may contain grammatical errors.

## 2021-12-04 ENCOUNTER — HOSPITAL ENCOUNTER (OUTPATIENT)
Age: 61
Discharge: HOME | End: 2021-12-04
Payer: COMMERCIAL

## 2021-12-04 DIAGNOSIS — I10 ESSENTIAL HYPERTENSION: ICD-10-CM

## 2021-12-04 DIAGNOSIS — E78.5 HYPERLIPIDEMIA, UNSPECIFIED HYPERLIPIDEMIA TYPE: ICD-10-CM

## 2021-12-04 DIAGNOSIS — R73.01 IMPAIRED FASTING GLUCOSE: ICD-10-CM

## 2021-12-04 LAB
ALBUMIN SERPL-MCNC: 4.3 G/DL (ref 3.5–5.2)
ALP BLD-CCNC: 96 U/L (ref 40–129)
ALT SERPL-CCNC: 14 U/L (ref 0–40)
ANION GAP SERPL CALCULATED.3IONS-SCNC: 8 MMOL/L (ref 7–16)
AST SERPL-CCNC: 14 U/L (ref 0–39)
BASOPHILS ABSOLUTE: 0.02 E9/L (ref 0–0.2)
BASOPHILS RELATIVE PERCENT: 0.4 % (ref 0–2)
BILIRUB SERPL-MCNC: 0.4 MG/DL (ref 0–1.2)
BUN BLDV-MCNC: 12 MG/DL (ref 6–23)
CALCIUM SERPL-MCNC: 9.8 MG/DL (ref 8.6–10.2)
CHLORIDE BLD-SCNC: 100 MMOL/L (ref 98–107)
CHOLESTEROL, TOTAL: 167 MG/DL (ref 0–199)
CO2: 32 MMOL/L (ref 22–29)
CREAT SERPL-MCNC: 0.9 MG/DL (ref 0.7–1.2)
EOSINOPHILS ABSOLUTE: 0.17 E9/L (ref 0.05–0.5)
EOSINOPHILS RELATIVE PERCENT: 3.4 % (ref 0–6)
GFR AFRICAN AMERICAN: >60
GFR NON-AFRICAN AMERICAN: >60 ML/MIN/1.73
GLUCOSE BLD-MCNC: 131 MG/DL (ref 74–99)
HBA1C MFR BLD: 6.1 % (ref 4–5.6)
HCT VFR BLD CALC: 47.8 % (ref 37–54)
HDLC SERPL-MCNC: 37 MG/DL
HEMOGLOBIN: 15.8 G/DL (ref 12.5–16.5)
IMMATURE GRANULOCYTES #: 0.02 E9/L
IMMATURE GRANULOCYTES %: 0.4 % (ref 0–5)
LDL CHOLESTEROL CALCULATED: 113 MG/DL (ref 0–99)
LYMPHOCYTES ABSOLUTE: 1.19 E9/L (ref 1.5–4)
LYMPHOCYTES RELATIVE PERCENT: 24 % (ref 20–42)
MCH RBC QN AUTO: 28.6 PG (ref 26–35)
MCHC RBC AUTO-ENTMCNC: 33.1 % (ref 32–34.5)
MCV RBC AUTO: 86.6 FL (ref 80–99.9)
MONOCYTES ABSOLUTE: 0.37 E9/L (ref 0.1–0.95)
MONOCYTES RELATIVE PERCENT: 7.5 % (ref 2–12)
NEUTROPHILS ABSOLUTE: 3.18 E9/L (ref 1.8–7.3)
NEUTROPHILS RELATIVE PERCENT: 64.3 % (ref 43–80)
PDW BLD-RTO: 13.1 FL (ref 11.5–15)
PLATELET # BLD: 168 E9/L (ref 130–450)
PMV BLD AUTO: 9 FL (ref 7–12)
POTASSIUM SERPL-SCNC: 4.8 MMOL/L (ref 3.5–5)
RBC # BLD: 5.52 E12/L (ref 3.8–5.8)
SODIUM BLD-SCNC: 140 MMOL/L (ref 132–146)
TOTAL PROTEIN: 7.4 G/DL (ref 6.4–8.3)
TRIGL SERPL-MCNC: 84 MG/DL (ref 0–149)
TSH SERPL DL<=0.05 MIU/L-ACNC: 1.33 UIU/ML (ref 0.27–4.2)
VLDLC SERPL CALC-MCNC: 17 MG/DL
WBC # BLD: 5 E9/L (ref 4.5–11.5)

## 2021-12-04 PROCEDURE — 80053 COMPREHEN METABOLIC PANEL: CPT

## 2021-12-04 PROCEDURE — 84443 ASSAY THYROID STIM HORMONE: CPT

## 2021-12-04 PROCEDURE — 84436 ASSAY OF TOTAL THYROXINE: CPT

## 2021-12-04 PROCEDURE — 36415 COLL VENOUS BLD VENIPUNCTURE: CPT

## 2021-12-04 PROCEDURE — 80061 LIPID PANEL: CPT

## 2021-12-04 PROCEDURE — 85025 COMPLETE CBC W/AUTO DIFF WBC: CPT

## 2021-12-04 PROCEDURE — 83036 HEMOGLOBIN GLYCOSYLATED A1C: CPT

## 2021-12-05 LAB — T4 TOTAL: 8.8 MCG/DL (ref 4.5–11.7)

## 2021-12-21 ENCOUNTER — OFFICE VISIT (OUTPATIENT)
Dept: PRIMARY CARE CLINIC | Age: 61
End: 2021-12-21
Payer: COMMERCIAL

## 2021-12-21 VITALS
HEIGHT: 66 IN | BODY MASS INDEX: 31.34 KG/M2 | TEMPERATURE: 97.4 F | OXYGEN SATURATION: 96 % | HEART RATE: 90 BPM | WEIGHT: 195 LBS | SYSTOLIC BLOOD PRESSURE: 138 MMHG | DIASTOLIC BLOOD PRESSURE: 76 MMHG

## 2021-12-21 DIAGNOSIS — I10 PRIMARY HYPERTENSION: Primary | ICD-10-CM

## 2021-12-21 DIAGNOSIS — R73.01 IMPAIRED FASTING GLUCOSE: ICD-10-CM

## 2021-12-21 DIAGNOSIS — E78.5 HYPERLIPIDEMIA, UNSPECIFIED HYPERLIPIDEMIA TYPE: ICD-10-CM

## 2021-12-21 PROCEDURE — G8427 DOCREV CUR MEDS BY ELIG CLIN: HCPCS | Performed by: FAMILY MEDICINE

## 2021-12-21 PROCEDURE — 4004F PT TOBACCO SCREEN RCVD TLK: CPT | Performed by: FAMILY MEDICINE

## 2021-12-21 PROCEDURE — 99214 OFFICE O/P EST MOD 30 MIN: CPT | Performed by: FAMILY MEDICINE

## 2021-12-21 PROCEDURE — 3017F COLORECTAL CA SCREEN DOC REV: CPT | Performed by: FAMILY MEDICINE

## 2021-12-21 PROCEDURE — G8417 CALC BMI ABV UP PARAM F/U: HCPCS | Performed by: FAMILY MEDICINE

## 2021-12-21 PROCEDURE — G8484 FLU IMMUNIZE NO ADMIN: HCPCS | Performed by: FAMILY MEDICINE

## 2021-12-21 ASSESSMENT — ENCOUNTER SYMPTOMS
ALLERGIC/IMMUNOLOGIC NEGATIVE: 1
GASTROINTESTINAL NEGATIVE: 1
RESPIRATORY NEGATIVE: 1
EYES NEGATIVE: 1

## 2021-12-21 NOTE — PROGRESS NOTES
21     Mg Jaffe    : 1960 Sex: male   Age: 59 y.o. Chief Complaint   Patient presents with   [address]       Prior to Admission medications    Medication Sig Start Date End Date Taking? Authorizing Provider   TREMFYA 100 MG/ML SOPN INJECT 100MG UNDER THE SKIN AT WEEK 0, WEEK 4, AND EVERY 8 WEEKS THEREAFTER 21  Yes Historical Provider, MD   hydrOXYzine (ATARAX) 10 MG tablet take 1 tablet by mouth four times a day if needed 21  Yes Historical Provider, MD   hydroCHLOROthiazide (HYDRODIURIL) 25 MG tablet Take 1 tablet by mouth every morning 3/4/21  Yes Sita Rader DO   pravastatin (PRAVACHOL) 40 MG tablet Take 1 tablet by mouth daily 20  Yes Sita Rader DO   amLODIPine (NORVASC) 5 MG tablet Take 1 tablet by mouth daily 1 tablet daily 20  Yes Sita Rader DO   metFORMIN (GLUCOPHAGE-XR) 500 MG extended release tablet take 1 tablet by mouth once daily with food 20  Yes Sita Rader DO   diclofenac sodium (VOLTAREN) 1 % GEL Apply 4 g topically 4 times daily 20  Yes Simón Clifford DPM   triamcinolone (KENALOG) 0.1 % cream Apply topically 2 times daily. 20  Yes LEONARDO Champagne   desoximetasone (TOPICORT) 0.25 % cream Apply topically 2 times daily. 20  Yes Sita Rader DO   sildenafil (REVATIO) 20 MG tablet 2-3 tabs as needed   Yes Historical Provider, MD          HPI: Patient evaluated today hypertension impaired fasting glucose hyperlipidemia all of which have been stable. Medications reviewed well-tolerated. Systems review stable. Review of Systems   Constitutional: Negative. HENT: Negative. Eyes: Negative. Respiratory: Negative. Gastrointestinal: Negative. Endocrine: Negative. Genitourinary: Negative. Musculoskeletal: Negative. Skin: Negative. Allergic/Immunologic: Negative. Neurological: Negative. Hematological: Negative. Psychiatric/Behavioral: Negative. Current Outpatient Medications:     TREMFYA 100 MG/ML SOPN, INJECT 100MG UNDER THE SKIN AT WEEK 0, WEEK 4, AND EVERY 8 WEEKS THEREAFTER, Disp: , Rfl:     hydrOXYzine (ATARAX) 10 MG tablet, take 1 tablet by mouth four times a day if needed, Disp: , Rfl:     hydroCHLOROthiazide (HYDRODIURIL) 25 MG tablet, Take 1 tablet by mouth every morning, Disp: 90 tablet, Rfl: 3    pravastatin (PRAVACHOL) 40 MG tablet, Take 1 tablet by mouth daily, Disp: 90 tablet, Rfl: 3    amLODIPine (NORVASC) 5 MG tablet, Take 1 tablet by mouth daily 1 tablet daily, Disp: 90 tablet, Rfl: 3    metFORMIN (GLUCOPHAGE-XR) 500 MG extended release tablet, take 1 tablet by mouth once daily with food, Disp: 90 tablet, Rfl: 3    diclofenac sodium (VOLTAREN) 1 % GEL, Apply 4 g topically 4 times daily, Disp: 4 Tube, Rfl: 1    triamcinolone (KENALOG) 0.1 % cream, Apply topically 2 times daily. , Disp: 80 g, Rfl: 1    desoximetasone (TOPICORT) 0.25 % cream, Apply topically 2 times daily. , Disp: 30 g, Rfl: 1    sildenafil (REVATIO) 20 MG tablet, 2-3 tabs as needed, Disp: , Rfl:     Allergies   Allergen Reactions    Mobic [Meloxicam] Hives       Social History     Tobacco Use    Smoking status: Heavy Tobacco Smoker     Packs/day: 1.50     Years: 30.00     Pack years: 45.00     Types: Cigarettes    Smokeless tobacco: Never Used   Substance Use Topics    Alcohol use: Yes     Comment: beer     Drug use: No      Past Surgical History:   Procedure Laterality Date    COLONOSCOPY  12/05/2016    JOINT REPLACEMENT Left     knee    KNEE ARTHROPLASTY       Family History   Problem Relation Age of Onset    Heart Disease Mother     Cancer Mother         Brain    Cancer Father         colon, Pancreatic    Heart Attack Father     Diabetes Father      Past Medical History:   Diagnosis Date    Abscess     Hyperlipidemia     Hypertension     Impaired fasting glucose     Psoriasis        Vitals:    12/21/21 1644   BP: 138/76   Pulse: 90 Temp: 97.4 °F (36.3 °C)   SpO2: 96%   Weight: 195 lb (88.5 kg)   Height: 5' 6\" (1.676 m)     BP Readings from Last 3 Encounters:   12/21/21 138/76   09/16/21 138/76   06/15/21 126/78        Physical Exam  Vitals and nursing note reviewed. Constitutional:       Appearance: He is well-developed. HENT:      Head: Normocephalic. Right Ear: External ear normal.      Left Ear: External ear normal.      Nose: Nose normal.   Eyes:      Conjunctiva/sclera: Conjunctivae normal.      Pupils: Pupils are equal, round, and reactive to light. Cardiovascular:      Rate and Rhythm: Normal rate. Pulmonary:      Breath sounds: Normal breath sounds. Abdominal:      General: Bowel sounds are normal.      Palpations: Abdomen is soft. Musculoskeletal:         General: Normal range of motion. Cervical back: Normal range of motion and neck supple. Skin:     General: Skin is warm and dry. Neurological:      Mental Status: He is alert and oriented to person, place, and time. Psychiatric:         Behavior: Behavior normal.     Today's vitals physical examination stable. Plan is to continue with meds as prescribed. Will reassess again in 3 months sooner if problems. Blood work at that time. Current labs are stable. Complete physical with next visit.  EKG and urine at that time    Lab Results   Component Value Date    TSH 1.330 12/04/2021    [id number] 8.8 12/04/2021     Lab Results   Component Value Date    CHOL 167 12/04/2021    CHOL 149 09/16/2021     Lab Results   Component Value Date    TRIG 84 12/04/2021    TRIG 87 09/16/2021     Lab Results   Component Value Date    HDL 37 12/04/2021    HDL 32 09/16/2021     No results found for: [facility]  Lab Results   Component Value Date    LABVLDL 17 12/04/2021    LABVLDL 17 09/16/2021     Lab Results   Component Value Date    CHOLHDLRATIO 4.7 02/27/2021    CHOLHDLRATIO 4.9 01/18/2020     Lab Results   Component Value Date    WBC 5.0 12/04/2021    HGB 15.8 12/04/2021 HCT 47.8 12/04/2021    MCV 86.6 12/04/2021     12/04/2021    LYMPHOPCT 24.0 12/04/2021    RBC 5.52 12/04/2021    MCH 28.6 12/04/2021    MCHC 33.1 12/04/2021    RDW 13.1 12/04/2021     Lab Results   Component Value Date     12/04/2021    K 4.8 12/04/2021     12/04/2021    CO2 32 (H) 12/04/2021    BUN 12 12/04/2021    CREATININE 0.9 12/04/2021    GLUCOSE 131 (H) 12/04/2021    CALCIUM 9.8 12/04/2021    PROT 7.4 12/04/2021    LABALBU 4.3 12/04/2021    BILITOT 0.4 12/04/2021    ALKPHOS 96 12/04/2021    AST 14 12/04/2021    ALT 14 12/04/2021    LABGLOM >60 12/04/2021    GFRAA >60 12/04/2021      No results found for: PSA, PSADIA   Lab Results   Component Value Date    LABA1C 6.1 (H) 12/04/2021    LABA1C 6.0 (H) 09/16/2021    LABA1C 6.0 (H) 06/05/2021     No results found for: EAG         Plan Per Assessment:  There are no diagnoses linked to this encounter. No follow-ups on file. Roni Edgar DO    Note was generated with the assistance of voice recognition software. Document was reviewed however may contain grammatical errors.

## 2021-12-27 RX ORDER — METFORMIN HYDROCHLORIDE 500 MG/1
TABLET, EXTENDED RELEASE ORAL
Qty: 90 TABLET | Refills: 3 | Status: SHIPPED | OUTPATIENT
Start: 2021-12-27

## 2022-01-10 RX ORDER — AMLODIPINE BESYLATE 5 MG/1
TABLET ORAL
Qty: 90 TABLET | Refills: 3 | Status: SHIPPED | OUTPATIENT
Start: 2022-01-10

## 2022-01-10 RX ORDER — PRAVASTATIN SODIUM 40 MG
TABLET ORAL
Qty: 90 TABLET | Refills: 3 | Status: SHIPPED | OUTPATIENT
Start: 2022-01-10

## 2022-03-19 ENCOUNTER — HOSPITAL ENCOUNTER (OUTPATIENT)
Age: 62
Discharge: HOME | End: 2022-03-19
Payer: COMMERCIAL

## 2022-03-19 DIAGNOSIS — E78.5 HYPERLIPIDEMIA, UNSPECIFIED HYPERLIPIDEMIA TYPE: ICD-10-CM

## 2022-03-19 DIAGNOSIS — I10 PRIMARY HYPERTENSION: ICD-10-CM

## 2022-03-19 DIAGNOSIS — R73.01 IMPAIRED FASTING GLUCOSE: ICD-10-CM

## 2022-03-19 LAB
ALBUMIN SERPL-MCNC: 4.2 G/DL (ref 3.5–5.2)
ALP BLD-CCNC: 89 U/L (ref 40–129)
ALT SERPL-CCNC: 16 U/L (ref 0–40)
ANION GAP SERPL CALCULATED.3IONS-SCNC: 11 MMOL/L (ref 7–16)
AST SERPL-CCNC: 17 U/L (ref 0–39)
BASOPHILS ABSOLUTE: 0.02 E9/L (ref 0–0.2)
BASOPHILS RELATIVE PERCENT: 0.4 % (ref 0–2)
BILIRUB SERPL-MCNC: 0.4 MG/DL (ref 0–1.2)
BUN BLDV-MCNC: 19 MG/DL (ref 6–23)
CALCIUM SERPL-MCNC: 9.5 MG/DL (ref 8.6–10.2)
CHLORIDE BLD-SCNC: 103 MMOL/L (ref 98–107)
CHOLESTEROL, TOTAL: 155 MG/DL (ref 0–199)
CO2: 27 MMOL/L (ref 22–29)
CREAT SERPL-MCNC: 0.9 MG/DL (ref 0.7–1.2)
EOSINOPHILS ABSOLUTE: 0.14 E9/L (ref 0.05–0.5)
EOSINOPHILS RELATIVE PERCENT: 2.8 % (ref 0–6)
GFR AFRICAN AMERICAN: >60
GFR NON-AFRICAN AMERICAN: >60 ML/MIN/1.73
GLUCOSE BLD-MCNC: 124 MG/DL (ref 74–99)
HBA1C MFR BLD: 6.3 % (ref 4–5.6)
HCT VFR BLD CALC: 45 % (ref 37–54)
HDLC SERPL-MCNC: 37 MG/DL
HEMOGLOBIN: 15.3 G/DL (ref 12.5–16.5)
IMMATURE GRANULOCYTES #: 0.02 E9/L
IMMATURE GRANULOCYTES %: 0.4 % (ref 0–5)
LDL CHOLESTEROL CALCULATED: 101 MG/DL (ref 0–99)
LYMPHOCYTES ABSOLUTE: 1.19 E9/L (ref 1.5–4)
LYMPHOCYTES RELATIVE PERCENT: 24 % (ref 20–42)
MCH RBC QN AUTO: 28.5 PG (ref 26–35)
MCHC RBC AUTO-ENTMCNC: 34 % (ref 32–34.5)
MCV RBC AUTO: 84 FL (ref 80–99.9)
MONOCYTES ABSOLUTE: 0.41 E9/L (ref 0.1–0.95)
MONOCYTES RELATIVE PERCENT: 8.3 % (ref 2–12)
NEUTROPHILS ABSOLUTE: 3.17 E9/L (ref 1.8–7.3)
NEUTROPHILS RELATIVE PERCENT: 64.1 % (ref 43–80)
PDW BLD-RTO: 13.3 FL (ref 11.5–15)
PLATELET # BLD: 160 E9/L (ref 130–450)
PMV BLD AUTO: 9.2 FL (ref 7–12)
POTASSIUM SERPL-SCNC: 3.9 MMOL/L (ref 3.5–5)
PROSTATE SPECIFIC ANTIGEN: 1.94 NG/ML (ref 0–4)
RBC # BLD: 5.36 E12/L (ref 3.8–5.8)
SODIUM BLD-SCNC: 141 MMOL/L (ref 132–146)
T4 TOTAL: 7.6 MCG/DL (ref 4.5–11.7)
TOTAL PROTEIN: 7.1 G/DL (ref 6.4–8.3)
TRIGL SERPL-MCNC: 87 MG/DL (ref 0–149)
TSH SERPL DL<=0.05 MIU/L-ACNC: 1.85 UIU/ML (ref 0.27–4.2)
VLDLC SERPL CALC-MCNC: 17 MG/DL
WBC # BLD: 5 E9/L (ref 4.5–11.5)

## 2022-03-19 PROCEDURE — 85025 COMPLETE CBC W/AUTO DIFF WBC: CPT

## 2022-03-19 PROCEDURE — 83036 HEMOGLOBIN GLYCOSYLATED A1C: CPT

## 2022-03-19 PROCEDURE — 84443 ASSAY THYROID STIM HORMONE: CPT

## 2022-03-19 PROCEDURE — 80061 LIPID PANEL: CPT

## 2022-03-19 PROCEDURE — G0103 PSA SCREENING: HCPCS

## 2022-03-19 PROCEDURE — 36415 COLL VENOUS BLD VENIPUNCTURE: CPT

## 2022-03-19 PROCEDURE — 84436 ASSAY OF TOTAL THYROXINE: CPT

## 2022-03-19 PROCEDURE — 80053 COMPREHEN METABOLIC PANEL: CPT

## 2022-03-21 RX ORDER — HYDROCHLOROTHIAZIDE 25 MG/1
25 TABLET ORAL EVERY MORNING
Qty: 90 TABLET | Refills: 3 | Status: SHIPPED | OUTPATIENT
Start: 2022-03-21

## 2022-03-29 ENCOUNTER — OFFICE VISIT (OUTPATIENT)
Dept: PRIMARY CARE CLINIC | Age: 62
End: 2022-03-29
Payer: COMMERCIAL

## 2022-03-29 VITALS
HEART RATE: 82 BPM | OXYGEN SATURATION: 97 % | SYSTOLIC BLOOD PRESSURE: 130 MMHG | WEIGHT: 196 LBS | DIASTOLIC BLOOD PRESSURE: 80 MMHG | HEIGHT: 66 IN | BODY MASS INDEX: 31.5 KG/M2 | TEMPERATURE: 97.7 F

## 2022-03-29 DIAGNOSIS — E78.5 HYPERLIPIDEMIA, UNSPECIFIED HYPERLIPIDEMIA TYPE: ICD-10-CM

## 2022-03-29 DIAGNOSIS — I10 PRIMARY HYPERTENSION: Primary | ICD-10-CM

## 2022-03-29 DIAGNOSIS — R73.01 IMPAIRED FASTING GLUCOSE: ICD-10-CM

## 2022-03-29 DIAGNOSIS — L40.9 PSORIASIS: ICD-10-CM

## 2022-03-29 DIAGNOSIS — F17.200 SMOKER: ICD-10-CM

## 2022-03-29 PROCEDURE — G8427 DOCREV CUR MEDS BY ELIG CLIN: HCPCS | Performed by: FAMILY MEDICINE

## 2022-03-29 PROCEDURE — G8484 FLU IMMUNIZE NO ADMIN: HCPCS | Performed by: FAMILY MEDICINE

## 2022-03-29 PROCEDURE — 4004F PT TOBACCO SCREEN RCVD TLK: CPT | Performed by: FAMILY MEDICINE

## 2022-03-29 PROCEDURE — 99214 OFFICE O/P EST MOD 30 MIN: CPT | Performed by: FAMILY MEDICINE

## 2022-03-29 PROCEDURE — G8417 CALC BMI ABV UP PARAM F/U: HCPCS | Performed by: FAMILY MEDICINE

## 2022-03-29 PROCEDURE — 3017F COLORECTAL CA SCREEN DOC REV: CPT | Performed by: FAMILY MEDICINE

## 2022-03-29 ASSESSMENT — ENCOUNTER SYMPTOMS
ALLERGIC/IMMUNOLOGIC NEGATIVE: 1
EYES NEGATIVE: 1
GASTROINTESTINAL NEGATIVE: 1
RESPIRATORY NEGATIVE: 1

## 2022-03-29 ASSESSMENT — PATIENT HEALTH QUESTIONNAIRE - PHQ9
SUM OF ALL RESPONSES TO PHQ QUESTIONS 1-9: 0
SUM OF ALL RESPONSES TO PHQ9 QUESTIONS 1 & 2: 0
2. FEELING DOWN, DEPRESSED OR HOPELESS: 0
1. LITTLE INTEREST OR PLEASURE IN DOING THINGS: 0
SUM OF ALL RESPONSES TO PHQ QUESTIONS 1-9: 0

## 2022-03-29 NOTE — PROGRESS NOTES
Psychiatric/Behavioral: Negative. Today systems review stable meds as prescribed. Current Outpatient Medications:     hydroCHLOROthiazide (HYDRODIURIL) 25 MG tablet, take 1 tablet by mouth every morning, Disp: 90 tablet, Rfl: 3    amLODIPine (NORVASC) 5 MG tablet, take 1 tablet by mouth once daily, Disp: 90 tablet, Rfl: 3    pravastatin (PRAVACHOL) 40 MG tablet, take 1 tablet by mouth once daily, Disp: 90 tablet, Rfl: 3    metFORMIN (GLUCOPHAGE-XR) 500 MG extended release tablet, take 1 tablet by mouth once daily with food, Disp: 90 tablet, Rfl: 3    TREMFYA 100 MG/ML SOPN, INJECT 100MG UNDER THE SKIN AT WEEK 0, WEEK 4, AND EVERY 8 WEEKS THEREAFTER, Disp: , Rfl:     hydrOXYzine (ATARAX) 10 MG tablet, take 1 tablet by mouth four times a day if needed, Disp: , Rfl:     diclofenac sodium (VOLTAREN) 1 % GEL, Apply 4 g topically 4 times daily, Disp: 4 Tube, Rfl: 1    triamcinolone (KENALOG) 0.1 % cream, Apply topically 2 times daily. , Disp: 80 g, Rfl: 1    desoximetasone (TOPICORT) 0.25 % cream, Apply topically 2 times daily. , Disp: 30 g, Rfl: 1    sildenafil (REVATIO) 20 MG tablet, 2-3 tabs as needed, Disp: , Rfl:     Allergies   Allergen Reactions    Mobic [Meloxicam] Hives       Social History     Tobacco Use    Smoking status: Heavy Tobacco Smoker     Packs/day: 1.50     Years: 30.00     Pack years: 45.00     Types: Cigarettes    Smokeless tobacco: Never Used   Substance Use Topics    Alcohol use: Yes     Comment: beer     Drug use: No      Past Surgical History:   Procedure Laterality Date    COLONOSCOPY  12/05/2016    JOINT REPLACEMENT Left     knee    KNEE ARTHROPLASTY       Family History   Problem Relation Age of Onset    Heart Disease Mother     Cancer Mother         Brain    Cancer Father         colon, Pancreatic    Heart Attack Father     Diabetes Father      Past Medical History:   Diagnosis Date    Abscess     Hyperlipidemia     Hypertension     Impaired fasting glucose     Psoriasis        Vitals:    03/29/22 1625   BP: 130/80   Pulse: 82   Temp: 97.7 °F (36.5 °C)   SpO2: 97%   Weight: 196 lb (88.9 kg)   Height: 5' 6\" (1.676 m)     BP Readings from Last 3 Encounters:   03/29/22 130/80   12/21/21 138/76   09/16/21 138/76        Physical Exam  Vitals and nursing note reviewed. Constitutional:       Appearance: He is well-developed. HENT:      Head: Normocephalic. Right Ear: External ear normal.      Left Ear: External ear normal.      Nose: Nose normal.   Eyes:      Conjunctiva/sclera: Conjunctivae normal.      Pupils: Pupils are equal, round, and reactive to light. Cardiovascular:      Rate and Rhythm: Normal rate. Pulmonary:      Breath sounds: Normal breath sounds. Abdominal:      General: Bowel sounds are normal.      Palpations: Abdomen is soft. Musculoskeletal:         General: Normal range of motion. Cervical back: Normal range of motion and neck supple. Skin:     General: Skin is warm and dry. Neurological:      Mental Status: He is alert and oriented to person, place, and time. Psychiatric:         Behavior: Behavior normal.     Today's vitals physical exam stable. Pressures controlled. Heart is regular lungs are clear. We will sit tight with current meds and care. Reassessment again 3 months sooner if problems. Blood work prior. Plan Per Assessment:  Susan Tomas was seen today for discuss labs. Diagnoses and all orders for this visit:    Primary hypertension  -     CBC with Auto Differential; Future  -     Comprehensive Metabolic Panel; Future  -     Hemoglobin A1C; Future  -     Lipid Panel; Future  -     T4; Future  -     TSH; Future    Impaired fasting glucose  -     CBC with Auto Differential; Future  -     Comprehensive Metabolic Panel; Future  -     Hemoglobin A1C; Future  -     Lipid Panel; Future  -     T4; Future  -     TSH;  Future    Hyperlipidemia, unspecified hyperlipidemia type  -     CBC with Auto Differential; Future  -     Comprehensive Metabolic Panel; Future  -     Hemoglobin A1C; Future  -     Lipid Panel; Future  -     T4; Future  -     TSH; Future    Smoker  -     CBC with Auto Differential; Future  -     Comprehensive Metabolic Panel; Future  -     Hemoglobin A1C; Future  -     Lipid Panel; Future  -     T4; Future  -     TSH; Future    Psoriasis  -     CBC with Auto Differential; Future  -     Comprehensive Metabolic Panel; Future  -     Hemoglobin A1C; Future  -     Lipid Panel; Future  -     T4; Future  -     TSH; Future            Return in about 3 months (around 6/29/2022). Sharath Kulkarni DO    Note was generated with the assistance of voice recognition software. Document was reviewed however may contain grammatical errors.

## 2022-07-18 ENCOUNTER — HOSPITAL ENCOUNTER (OUTPATIENT)
Age: 62
Discharge: HOME | End: 2022-07-18
Payer: COMMERCIAL

## 2022-07-18 DIAGNOSIS — E78.5 HYPERLIPIDEMIA, UNSPECIFIED HYPERLIPIDEMIA TYPE: ICD-10-CM

## 2022-07-18 DIAGNOSIS — L40.9 PSORIASIS: ICD-10-CM

## 2022-07-18 DIAGNOSIS — R73.01 IMPAIRED FASTING GLUCOSE: ICD-10-CM

## 2022-07-18 DIAGNOSIS — F17.200 SMOKER: ICD-10-CM

## 2022-07-18 DIAGNOSIS — I10 PRIMARY HYPERTENSION: ICD-10-CM

## 2022-07-18 LAB
ALBUMIN SERPL-MCNC: 3.9 G/DL (ref 3.5–5.2)
ALP BLD-CCNC: 86 U/L (ref 40–129)
ALT SERPL-CCNC: 12 U/L (ref 0–40)
ANION GAP SERPL CALCULATED.3IONS-SCNC: 10 MMOL/L (ref 7–16)
AST SERPL-CCNC: 13 U/L (ref 0–39)
BASOPHILS ABSOLUTE: 0.01 E9/L (ref 0–0.2)
BASOPHILS RELATIVE PERCENT: 0.2 % (ref 0–2)
BILIRUB SERPL-MCNC: 0.4 MG/DL (ref 0–1.2)
BUN BLDV-MCNC: 16 MG/DL (ref 6–23)
CALCIUM SERPL-MCNC: 9.1 MG/DL (ref 8.6–10.2)
CHLORIDE BLD-SCNC: 104 MMOL/L (ref 98–107)
CHOLESTEROL, TOTAL: 177 MG/DL (ref 0–199)
CO2: 26 MMOL/L (ref 22–29)
CREAT SERPL-MCNC: 0.7 MG/DL (ref 0.7–1.2)
EOSINOPHILS ABSOLUTE: 0.07 E9/L (ref 0.05–0.5)
EOSINOPHILS RELATIVE PERCENT: 1.5 % (ref 0–6)
GFR AFRICAN AMERICAN: >60
GFR NON-AFRICAN AMERICAN: >60 ML/MIN/1.73
GLUCOSE BLD-MCNC: 132 MG/DL (ref 74–99)
HBA1C MFR BLD: 6.1 % (ref 4–5.6)
HCT VFR BLD CALC: 43.5 % (ref 37–54)
HDLC SERPL-MCNC: 32 MG/DL
HEMOGLOBIN: 14.8 G/DL (ref 12.5–16.5)
IMMATURE GRANULOCYTES #: 0.02 E9/L
IMMATURE GRANULOCYTES %: 0.4 % (ref 0–5)
LDL CHOLESTEROL CALCULATED: 128 MG/DL (ref 0–99)
LYMPHOCYTES ABSOLUTE: 1.2 E9/L (ref 1.5–4)
LYMPHOCYTES RELATIVE PERCENT: 26.3 % (ref 20–42)
MCH RBC QN AUTO: 28.6 PG (ref 26–35)
MCHC RBC AUTO-ENTMCNC: 34 % (ref 32–34.5)
MCV RBC AUTO: 84.1 FL (ref 80–99.9)
MONOCYTES ABSOLUTE: 0.4 E9/L (ref 0.1–0.95)
MONOCYTES RELATIVE PERCENT: 8.8 % (ref 2–12)
NEUTROPHILS ABSOLUTE: 2.86 E9/L (ref 1.8–7.3)
NEUTROPHILS RELATIVE PERCENT: 62.8 % (ref 43–80)
PDW BLD-RTO: 13.4 FL (ref 11.5–15)
PLATELET # BLD: 138 E9/L (ref 130–450)
PMV BLD AUTO: 9.6 FL (ref 7–12)
POTASSIUM SERPL-SCNC: 4 MMOL/L (ref 3.5–5)
RBC # BLD: 5.17 E12/L (ref 3.8–5.8)
SODIUM BLD-SCNC: 140 MMOL/L (ref 132–146)
T4 TOTAL: 8.2 MCG/DL (ref 4.5–11.7)
TOTAL PROTEIN: 7.1 G/DL (ref 6.4–8.3)
TRIGL SERPL-MCNC: 84 MG/DL (ref 0–149)
TSH SERPL DL<=0.05 MIU/L-ACNC: 2.13 UIU/ML (ref 0.27–4.2)
VLDLC SERPL CALC-MCNC: 17 MG/DL
WBC # BLD: 4.6 E9/L (ref 4.5–11.5)

## 2022-07-18 PROCEDURE — 80053 COMPREHEN METABOLIC PANEL: CPT

## 2022-07-18 PROCEDURE — 80061 LIPID PANEL: CPT

## 2022-07-18 PROCEDURE — 84443 ASSAY THYROID STIM HORMONE: CPT

## 2022-07-18 PROCEDURE — 36415 COLL VENOUS BLD VENIPUNCTURE: CPT

## 2022-07-18 PROCEDURE — 83036 HEMOGLOBIN GLYCOSYLATED A1C: CPT

## 2022-07-18 PROCEDURE — 84436 ASSAY OF TOTAL THYROXINE: CPT

## 2022-07-18 PROCEDURE — 85025 COMPLETE CBC W/AUTO DIFF WBC: CPT

## 2022-08-05 ENCOUNTER — OFFICE VISIT (OUTPATIENT)
Dept: PRIMARY CARE CLINIC | Age: 62
End: 2022-08-05
Payer: COMMERCIAL

## 2022-08-05 VITALS
WEIGHT: 190 LBS | DIASTOLIC BLOOD PRESSURE: 76 MMHG | OXYGEN SATURATION: 98 % | TEMPERATURE: 97.7 F | SYSTOLIC BLOOD PRESSURE: 138 MMHG | HEART RATE: 82 BPM | BODY MASS INDEX: 30.67 KG/M2

## 2022-08-05 DIAGNOSIS — L02.415 ABSCESS OF KNEE, RIGHT: Primary | ICD-10-CM

## 2022-08-05 DIAGNOSIS — R73.01 IMPAIRED FASTING GLUCOSE: ICD-10-CM

## 2022-08-05 DIAGNOSIS — E78.5 HYPERLIPIDEMIA, UNSPECIFIED HYPERLIPIDEMIA TYPE: ICD-10-CM

## 2022-08-05 DIAGNOSIS — L08.9 SKIN INFECTION: ICD-10-CM

## 2022-08-05 DIAGNOSIS — I10 PRIMARY HYPERTENSION: ICD-10-CM

## 2022-08-05 PROCEDURE — 3017F COLORECTAL CA SCREEN DOC REV: CPT | Performed by: FAMILY MEDICINE

## 2022-08-05 PROCEDURE — 99214 OFFICE O/P EST MOD 30 MIN: CPT | Performed by: FAMILY MEDICINE

## 2022-08-05 PROCEDURE — G8427 DOCREV CUR MEDS BY ELIG CLIN: HCPCS | Performed by: FAMILY MEDICINE

## 2022-08-05 PROCEDURE — 4004F PT TOBACCO SCREEN RCVD TLK: CPT | Performed by: FAMILY MEDICINE

## 2022-08-05 PROCEDURE — G8417 CALC BMI ABV UP PARAM F/U: HCPCS | Performed by: FAMILY MEDICINE

## 2022-08-05 RX ORDER — DOXYCYCLINE HYCLATE 100 MG/1
100 CAPSULE ORAL 2 TIMES DAILY
Qty: 28 CAPSULE | Refills: 0 | Status: SHIPPED | OUTPATIENT
Start: 2022-08-05 | End: 2022-08-19

## 2022-08-05 RX ORDER — DUPILUMAB 300 MG/2ML
INJECTION, SOLUTION SUBCUTANEOUS
COMMUNITY
Start: 2022-07-14

## 2022-08-05 ASSESSMENT — ENCOUNTER SYMPTOMS
GASTROINTESTINAL NEGATIVE: 1
RESPIRATORY NEGATIVE: 1
EYES NEGATIVE: 1
ALLERGIC/IMMUNOLOGIC NEGATIVE: 1

## 2022-08-05 NOTE — PROGRESS NOTES
Negative. Musculoskeletal: Negative. Skin: Negative. Allergic/Immunologic: Negative. Neurological: Negative. Hematological: Negative. Psychiatric/Behavioral: Negative. Current Outpatient Medications:     DUPIXENT 300 MG/2ML SOPN injection, , Disp: , Rfl:     hydroCHLOROthiazide (HYDRODIURIL) 25 MG tablet, take 1 tablet by mouth every morning, Disp: 90 tablet, Rfl: 3    amLODIPine (NORVASC) 5 MG tablet, take 1 tablet by mouth once daily, Disp: 90 tablet, Rfl: 3    pravastatin (PRAVACHOL) 40 MG tablet, take 1 tablet by mouth once daily, Disp: 90 tablet, Rfl: 3    metFORMIN (GLUCOPHAGE-XR) 500 MG extended release tablet, take 1 tablet by mouth once daily with food, Disp: 90 tablet, Rfl: 3    hydrOXYzine (ATARAX) 10 MG tablet, take 1 tablet by mouth four times a day if needed, Disp: , Rfl:     diclofenac sodium (VOLTAREN) 1 % GEL, Apply 4 g topically 4 times daily, Disp: 4 Tube, Rfl: 1    triamcinolone (KENALOG) 0.1 % cream, Apply topically 2 times daily. , Disp: 80 g, Rfl: 1    desoximetasone (TOPICORT) 0.25 % cream, Apply topically 2 times daily. , Disp: 30 g, Rfl: 1    sildenafil (REVATIO) 20 MG tablet, 2-3 tabs as needed, Disp: , Rfl:     Allergies   Allergen Reactions    Mobic [Meloxicam] Hives       Social History     Tobacco Use    Smoking status: Heavy Smoker     Packs/day: 1.50     Years: 30.00     Pack years: 45.00     Types: Cigarettes    Smokeless tobacco: Never   Substance Use Topics    Alcohol use: Yes     Comment: beer     Drug use: No      Past Surgical History:   Procedure Laterality Date    COLONOSCOPY  12/05/2016    JOINT REPLACEMENT Left     knee    KNEE ARTHROPLASTY       Family History   Problem Relation Age of Onset    Heart Disease Mother     Cancer Mother         Brain    Cancer Father         colon, Pancreatic    Heart Attack Father     Diabetes Father      Past Medical History:   Diagnosis Date    Abscess     Hyperlipidemia     Hypertension     Impaired fasting glucose     Psoriasis        Vitals:    08/05/22 0824   BP: 138/76   Pulse: 82   Temp: 97.7 °F (36.5 °C)   SpO2: 98%   Weight: 190 lb (86.2 kg)     BP Readings from Last 3 Encounters:   08/05/22 138/76   03/29/22 130/80   12/21/21 138/76    136/70    Physical Exam  Vitals and nursing note reviewed. Constitutional:       Appearance: He is well-developed. HENT:      Head: Normocephalic. Right Ear: External ear normal.      Left Ear: External ear normal.      Nose: Nose normal.   Eyes:      Conjunctiva/sclera: Conjunctivae normal.      Pupils: Pupils are equal, round, and reactive to light. Cardiovascular:      Rate and Rhythm: Normal rate. Pulmonary:      Breath sounds: Normal breath sounds. Abdominal:      General: Bowel sounds are normal.      Palpations: Abdomen is soft. Musculoskeletal:         General: Normal range of motion. Cervical back: Normal range of motion and neck supple. Skin:     General: Skin is warm and dry. Neurological:      Mental Status: He is alert and oriented to person, place, and time. Psychiatric:         Behavior: Behavior normal.      Today's vitals physical examination stable. Medications as prescribed. Follow-up visit with me 3 months and sooner if problems. Labs reviewed and all stable.   Lab Results   Component Value Date    TSH 2.130 07/18/2022    TSH 1.850 03/19/2022    [id number] 8.2 07/18/2022    X6KTVZA 7.6 03/19/2022     Lab Results   Component Value Date    CHOL 177 07/18/2022    CHOL 155 03/19/2022     Lab Results   Component Value Date    TRIG 84 07/18/2022    TRIG 87 03/19/2022     Lab Results   Component Value Date    HDL 32 07/18/2022    HDL 37 03/19/2022     No results found for: [facility]  Lab Results   Component Value Date    LABVLDL 17 07/18/2022    LABVLDL 17 03/19/2022     Lab Results   Component Value Date    CHOLHDLRATIO 4.7 02/27/2021    CHOLHDLRATIO 4.9 01/18/2020     Lab Results   Component Value Date    WBC 4.6 07/18/2022    HGB 14.8 07/18/2022    HCT 43.5 07/18/2022    MCV 84.1 07/18/2022     07/18/2022    LYMPHOPCT 26.3 07/18/2022    RBC 5.17 07/18/2022    MCH 28.6 07/18/2022    MCHC 34.0 07/18/2022    RDW 13.4 07/18/2022     Lab Results   Component Value Date     07/18/2022    K 4.0 07/18/2022     07/18/2022    CO2 26 07/18/2022    BUN 16 07/18/2022    CREATININE 0.7 07/18/2022    GLUCOSE 132 (H) 07/18/2022    CALCIUM 9.1 07/18/2022    PROT 7.1 07/18/2022    LABALBU 3.9 07/18/2022    BILITOT 0.4 07/18/2022    ALKPHOS 86 07/18/2022    AST 13 07/18/2022    ALT 12 07/18/2022    LABGLOM >60 07/18/2022    GFRAA >60 07/18/2022        Lab Results   Component Value Date    PSA 1.94 03/19/2022      Lab Results   Component Value Date    LABA1C 6.1 (H) 07/18/2022    LABA1C 6.3 (H) 03/19/2022    LABA1C 6.1 (H) 12/04/2021     No results found for: EAG          Plan Per Assessment:  Sarah Tenorio was seen today for discuss labs and cellulitis. Diagnoses and all orders for this visit:    Abscess of knee, right    Primary hypertension    Impaired fasting glucose    Skin infection    Hyperlipidemia, unspecified hyperlipidemia type            No follow-ups on file. Howard Carlton DO    Note was generated with the assistance of voice recognition software. Document was reviewed however may contain grammatical errors.

## 2022-11-05 ENCOUNTER — HOSPITAL ENCOUNTER (OUTPATIENT)
Age: 62
Discharge: HOME | End: 2022-11-05
Payer: COMMERCIAL

## 2022-11-05 DIAGNOSIS — I10 PRIMARY HYPERTENSION: ICD-10-CM

## 2022-11-05 DIAGNOSIS — E78.5 HYPERLIPIDEMIA, UNSPECIFIED HYPERLIPIDEMIA TYPE: ICD-10-CM

## 2022-11-05 DIAGNOSIS — R73.01 IMPAIRED FASTING GLUCOSE: ICD-10-CM

## 2022-11-05 LAB
ALBUMIN SERPL-MCNC: 4.3 G/DL (ref 3.5–5.2)
ALP BLD-CCNC: 85 U/L (ref 40–129)
ALT SERPL-CCNC: 12 U/L (ref 0–40)
ANION GAP SERPL CALCULATED.3IONS-SCNC: 10 MMOL/L (ref 7–16)
AST SERPL-CCNC: 13 U/L (ref 0–39)
BILIRUB SERPL-MCNC: 0.5 MG/DL (ref 0–1.2)
BUN BLDV-MCNC: 15 MG/DL (ref 6–23)
CALCIUM SERPL-MCNC: 9.6 MG/DL (ref 8.6–10.2)
CHLORIDE BLD-SCNC: 102 MMOL/L (ref 98–107)
CHOLESTEROL, TOTAL: 160 MG/DL (ref 0–199)
CO2: 27 MMOL/L (ref 22–29)
CREAT SERPL-MCNC: 0.8 MG/DL (ref 0.7–1.2)
GFR SERPL CREATININE-BSD FRML MDRD: >60 ML/MIN/1.73
GLUCOSE BLD-MCNC: 134 MG/DL (ref 74–99)
HBA1C MFR BLD: 6.2 % (ref 4–5.6)
HDLC SERPL-MCNC: 37 MG/DL
LDL CHOLESTEROL CALCULATED: 104 MG/DL (ref 0–99)
POTASSIUM SERPL-SCNC: 3.6 MMOL/L (ref 3.5–5)
SODIUM BLD-SCNC: 139 MMOL/L (ref 132–146)
TOTAL PROTEIN: 7.1 G/DL (ref 6.4–8.3)
TRIGL SERPL-MCNC: 93 MG/DL (ref 0–149)
VLDLC SERPL CALC-MCNC: 19 MG/DL

## 2022-11-05 PROCEDURE — 80061 LIPID PANEL: CPT

## 2022-11-05 PROCEDURE — 80053 COMPREHEN METABOLIC PANEL: CPT

## 2022-11-05 PROCEDURE — 36415 COLL VENOUS BLD VENIPUNCTURE: CPT

## 2022-11-05 PROCEDURE — 83036 HEMOGLOBIN GLYCOSYLATED A1C: CPT

## 2022-11-09 ENCOUNTER — OFFICE VISIT (OUTPATIENT)
Dept: PRIMARY CARE CLINIC | Age: 62
End: 2022-11-09
Payer: COMMERCIAL

## 2022-11-09 VITALS
SYSTOLIC BLOOD PRESSURE: 146 MMHG | HEIGHT: 66 IN | WEIGHT: 192 LBS | OXYGEN SATURATION: 96 % | TEMPERATURE: 97.2 F | BODY MASS INDEX: 30.86 KG/M2 | HEART RATE: 86 BPM | DIASTOLIC BLOOD PRESSURE: 86 MMHG

## 2022-11-09 DIAGNOSIS — R73.01 IMPAIRED FASTING GLUCOSE: ICD-10-CM

## 2022-11-09 DIAGNOSIS — M17.12 PRIMARY OSTEOARTHRITIS OF LEFT KNEE: ICD-10-CM

## 2022-11-09 DIAGNOSIS — L40.9 PSORIASIS: ICD-10-CM

## 2022-11-09 DIAGNOSIS — F17.200 SMOKER: ICD-10-CM

## 2022-11-09 DIAGNOSIS — I10 PRIMARY HYPERTENSION: Primary | ICD-10-CM

## 2022-11-09 DIAGNOSIS — E78.5 HYPERLIPIDEMIA, UNSPECIFIED HYPERLIPIDEMIA TYPE: ICD-10-CM

## 2022-11-09 PROCEDURE — 4004F PT TOBACCO SCREEN RCVD TLK: CPT | Performed by: FAMILY MEDICINE

## 2022-11-09 PROCEDURE — G8427 DOCREV CUR MEDS BY ELIG CLIN: HCPCS | Performed by: FAMILY MEDICINE

## 2022-11-09 PROCEDURE — G8417 CALC BMI ABV UP PARAM F/U: HCPCS | Performed by: FAMILY MEDICINE

## 2022-11-09 PROCEDURE — 3078F DIAST BP <80 MM HG: CPT | Performed by: FAMILY MEDICINE

## 2022-11-09 PROCEDURE — 99214 OFFICE O/P EST MOD 30 MIN: CPT | Performed by: FAMILY MEDICINE

## 2022-11-09 PROCEDURE — 3074F SYST BP LT 130 MM HG: CPT | Performed by: FAMILY MEDICINE

## 2022-11-09 PROCEDURE — 3017F COLORECTAL CA SCREEN DOC REV: CPT | Performed by: FAMILY MEDICINE

## 2022-11-09 PROCEDURE — G8484 FLU IMMUNIZE NO ADMIN: HCPCS | Performed by: FAMILY MEDICINE

## 2022-11-09 SDOH — ECONOMIC STABILITY: FOOD INSECURITY: WITHIN THE PAST 12 MONTHS, YOU WORRIED THAT YOUR FOOD WOULD RUN OUT BEFORE YOU GOT MONEY TO BUY MORE.: NEVER TRUE

## 2022-11-09 SDOH — ECONOMIC STABILITY: FOOD INSECURITY: WITHIN THE PAST 12 MONTHS, THE FOOD YOU BOUGHT JUST DIDN'T LAST AND YOU DIDN'T HAVE MONEY TO GET MORE.: NEVER TRUE

## 2022-11-09 ASSESSMENT — ENCOUNTER SYMPTOMS
EYES NEGATIVE: 1
GASTROINTESTINAL NEGATIVE: 1
RESPIRATORY NEGATIVE: 1
ALLERGIC/IMMUNOLOGIC NEGATIVE: 1

## 2022-11-09 ASSESSMENT — SOCIAL DETERMINANTS OF HEALTH (SDOH): HOW HARD IS IT FOR YOU TO PAY FOR THE VERY BASICS LIKE FOOD, HOUSING, MEDICAL CARE, AND HEATING?: NOT HARD AT ALL

## 2022-11-09 NOTE — PROGRESS NOTES
22     Mg Jaffe    : 1960 Sex: male   Age: 57 y.o. Chief Complaint   Patient presents with    Diabetes    Hypertension    Discuss Labs       Prior to Admission medications    Medication Sig Start Date End Date Taking? Authorizing Provider   [place] 300 MG/2ML SOPN injection  22  Yes Historical Provider, MD   hydroCHLOROthiazide (HYDRODIURIL) 25 MG tablet take 1 tablet by mouth every morning 3/21/22  Yes Tori Rader DO   amLODIPine (NORVASC) 5 MG tablet take 1 tablet by mouth once daily 1/10/22  Yes Tori Rader DO   pravastatin (PRAVACHOL) 40 MG tablet take 1 tablet by mouth once daily 1/10/22  Yes Tori Rader DO   metFORMIN (GLUCOPHAGE-XR) 500 MG extended release tablet take 1 tablet by mouth once daily with food 21  Yes Tori Rader DO   hydrOXYzine (ATARAX) 10 MG tablet take 1 tablet by mouth four times a day if needed 21  Yes Historical Provider, MD   diclofenac sodium (VOLTAREN) 1 % GEL Apply 4 g topically 4 times daily 20  Yes Lalito Carmona DPM   triamcinolone (KENALOG) 0.1 % cream Apply topically 2 times daily. 20  Yes LEONARDO Pizarro   desoximetasone (TOPICORT) 0.25 % cream Apply topically 2 times daily. 20  Yes Ravinder Rader DO   sildenafil (REVATIO) 20 MG tablet 2-3 tabs as needed   Yes Historical Provider, MD          HPI:           Review of Systems   Constitutional: Negative. HENT: Negative. Eyes: Negative. Respiratory: Negative. Gastrointestinal: Negative. Endocrine: Negative. Genitourinary: Negative. Musculoskeletal: Negative. Skin: Negative. Allergic/Immunologic: Negative. Neurological: Negative. Hematological: Negative. Psychiatric/Behavioral: Negative.               Current Outpatient Medications:     DUPIXENT 300 MG/2ML SOPN injection, , Disp: , Rfl:     hydroCHLOROthiazide (HYDRODIURIL) 25 MG tablet, take 1 tablet by mouth every morning, Disp: 90 tablet, Rfl: 3 amLODIPine (NORVASC) 5 MG tablet, take 1 tablet by mouth once daily, Disp: 90 tablet, Rfl: 3    pravastatin (PRAVACHOL) 40 MG tablet, take 1 tablet by mouth once daily, Disp: 90 tablet, Rfl: 3    metFORMIN (GLUCOPHAGE-XR) 500 MG extended release tablet, take 1 tablet by mouth once daily with food, Disp: 90 tablet, Rfl: 3    hydrOXYzine (ATARAX) 10 MG tablet, take 1 tablet by mouth four times a day if needed, Disp: , Rfl:     diclofenac sodium (VOLTAREN) 1 % GEL, Apply 4 g topically 4 times daily, Disp: 4 Tube, Rfl: 1    triamcinolone (KENALOG) 0.1 % cream, Apply topically 2 times daily. , Disp: 80 g, Rfl: 1    desoximetasone (TOPICORT) 0.25 % cream, Apply topically 2 times daily. , Disp: 30 g, Rfl: 1    sildenafil (REVATIO) 20 MG tablet, 2-3 tabs as needed, Disp: , Rfl:     Allergies   Allergen Reactions    Mobic [Meloxicam] Hives       Social History     Tobacco Use    Smoking status: Heavy Smoker     Packs/day: 1.50     Years: 30.00     Pack years: 45.00     Types: Cigarettes    Smokeless tobacco: Never   Substance Use Topics    Alcohol use: Yes     Comment: beer     Drug use: No      Past Surgical History:   Procedure Laterality Date    COLONOSCOPY  12/05/2016    JOINT REPLACEMENT Left     knee    KNEE ARTHROPLASTY       Family History   Problem Relation Age of Onset    Heart Disease Mother     Cancer Mother         Brain    Cancer Father         colon, Pancreatic    Heart Attack Father     Diabetes Father      Past Medical History:   Diagnosis Date    Abscess     Hyperlipidemia     Hypertension     Impaired fasting glucose     Psoriasis        Vitals:    11/09/22 1508   BP: (!) 146/86   Pulse: 86   Temp: 97.2 °F (36.2 °C)   SpO2: 96%   Weight: 192 lb (87.1 kg)   Height: 5' 6\" (1.676 m)     BP Readings from Last 3 Encounters:   11/09/22 (!) 146/86   08/05/22 138/76   03/29/22 130/80        Physical Exam  Vitals and nursing note reviewed. Constitutional:       Appearance: He is well-developed.    HENT:      Head: Normocephalic. Right Ear: External ear normal.      Left Ear: External ear normal.      Nose: Nose normal.   Eyes:      Conjunctiva/sclera: Conjunctivae normal.      Pupils: Pupils are equal, round, and reactive to light. Cardiovascular:      Rate and Rhythm: Normal rate. Pulmonary:      Breath sounds: Normal breath sounds. Abdominal:      General: Bowel sounds are normal.      Palpations: Abdomen is soft. Musculoskeletal:         General: Normal range of motion. Cervical back: Normal range of motion and neck supple. Skin:     General: Skin is warm and dry. Neurological:      Mental Status: He is alert and oriented to person, place, and time.    Psychiatric:         Behavior: Behavior normal.            Lab Results   Component Value Date    TSH 2.130 07/18/2022    TSH 1.850 03/19/2022    T2LNVMT 8.2 07/18/2022    M5ZHTYR 7.6 03/19/2022     Lab Results   Component Value Date    CHOL 160 11/05/2022    CHOL 177 07/18/2022     Lab Results   Component Value Date    TRIG 93 11/05/2022    TRIG 84 07/18/2022     Lab Results   Component Value Date    HDL 37 11/05/2022    HDL 32 07/18/2022     No results found for: [facility]  Lab Results   Component Value Date    LABVLDL 19 11/05/2022    LABVLDL 17 07/18/2022     Lab Results   Component Value Date    CHOLHDLRATIO 4.7 02/27/2021    CHOLHDLRATIO 4.9 01/18/2020     Lab Results   Component Value Date    WBC 4.6 07/18/2022    HGB 14.8 07/18/2022    HCT 43.5 07/18/2022    MCV 84.1 07/18/2022     07/18/2022    LYMPHOPCT 26.3 07/18/2022    RBC 5.17 07/18/2022    MCH 28.6 07/18/2022    MCHC 34.0 07/18/2022    RDW 13.4 07/18/2022     Lab Results   Component Value Date     11/05/2022    K 3.6 11/05/2022     11/05/2022    CO2 27 11/05/2022    BUN 15 11/05/2022    CREATININE 0.8 11/05/2022    GLUCOSE 134 (H) 11/05/2022    CALCIUM 9.6 11/05/2022    PROT 7.1 11/05/2022    LABALBU 4.3 11/05/2022    BILITOT 0.5 11/05/2022    ALKPHOS 85 11/05/2022 AST 13 11/05/2022    ALT 12 11/05/2022    LABGLOM >60 11/05/2022    GFRAA >60 07/18/2022        Lab Results   Component Value Date    PSA 1.94 03/19/2022      Lab Results   Component Value Date    LABA1C 6.2 (H) 11/05/2022    LABA1C 6.1 (H) 07/18/2022    LABA1C 6.3 (H) 03/19/2022     No results found for: EAG      Plan Per Assessment:  Randal Lee was seen today for diabetes, hypertension and discuss labs. Diagnoses and all orders for this visit:    Primary hypertension    Impaired fasting glucose    Hyperlipidemia, unspecified hyperlipidemia type          No follow-ups on file. Yuridia Guan, DO    Note was generated with the assistance of voice recognition software. Document was reviewed however may contain grammatical errors.

## 2022-11-09 NOTE — PROGRESS NOTES
22     Mg Jaffe    : 1960 Sex: male   Age: 57 y.o. Chief Complaint   Patient presents with    Diabetes    Hypertension    Discuss Labs       Prior to Admission medications    Medication Sig Start Date End Date Taking? Authorizing Provider   [place] 300 MG/2ML SOPN injection  22  Yes Historical Provider, MD   hydroCHLOROthiazide (HYDRODIURIL) 25 MG tablet take 1 tablet by mouth every morning 3/21/22  Yes Jonathan Rader DO   amLODIPine (NORVASC) 5 MG tablet take 1 tablet by mouth once daily 1/10/22  Yes Jonathan Rader DO   pravastatin (PRAVACHOL) 40 MG tablet take 1 tablet by mouth once daily 1/10/22  Yes Jonathan Rader DO   metFORMIN (GLUCOPHAGE-XR) 500 MG extended release tablet take 1 tablet by mouth once daily with food 21  Yes Jonathan Rader DO   hydrOXYzine (ATARAX) 10 MG tablet take 1 tablet by mouth four times a day if needed 21  Yes Historical Provider, MD   diclofenac sodium (VOLTAREN) 1 % GEL Apply 4 g topically 4 times daily 20  Yes Fritz Haddad DPM   triamcinolone (KENALOG) 0.1 % cream Apply topically 2 times daily. 20  Yes LEONARDO Cintron   desoximetasone (TOPICORT) 0.25 % cream Apply topically 2 times daily. 20  Yes Jonathan Rader DO   sildenafil (REVATIO) 20 MG tablet 2-3 tabs as needed   Yes Historical Provider, MD          HPI: Patient seen today in follow-up on hypertension impaired fasting glucose hyperlipidemia psoriasis all of which has been fairly stable. Medications well-tolerated. Cigarettes continuing recommended discontinuing. Degenerative joint disease involving the left knee and recommended orthopedic follow-up. Dr. Suleiman Amado        Review of Systems   Constitutional: Negative. HENT: Negative. Eyes: Negative. Respiratory: Negative. Gastrointestinal: Negative. Endocrine: Negative. Genitourinary: Negative. Musculoskeletal:  Positive for arthralgias and gait problem. Skin: Negative. Allergic/Immunologic: Negative. Hematological: Negative. Psychiatric/Behavioral: Negative. Current Outpatient Medications:     DUPIXENT 300 MG/2ML SOPN injection, , Disp: , Rfl:     hydroCHLOROthiazide (HYDRODIURIL) 25 MG tablet, take 1 tablet by mouth every morning, Disp: 90 tablet, Rfl: 3    amLODIPine (NORVASC) 5 MG tablet, take 1 tablet by mouth once daily, Disp: 90 tablet, Rfl: 3    pravastatin (PRAVACHOL) 40 MG tablet, take 1 tablet by mouth once daily, Disp: 90 tablet, Rfl: 3    metFORMIN (GLUCOPHAGE-XR) 500 MG extended release tablet, take 1 tablet by mouth once daily with food, Disp: 90 tablet, Rfl: 3    hydrOXYzine (ATARAX) 10 MG tablet, take 1 tablet by mouth four times a day if needed, Disp: , Rfl:     diclofenac sodium (VOLTAREN) 1 % GEL, Apply 4 g topically 4 times daily, Disp: 4 Tube, Rfl: 1    triamcinolone (KENALOG) 0.1 % cream, Apply topically 2 times daily. , Disp: 80 g, Rfl: 1    desoximetasone (TOPICORT) 0.25 % cream, Apply topically 2 times daily. , Disp: 30 g, Rfl: 1    sildenafil (REVATIO) 20 MG tablet, 2-3 tabs as needed, Disp: , Rfl:     Allergies   Allergen Reactions    Mobic [Meloxicam] Hives       Social History     Tobacco Use    Smoking status: Heavy Smoker     Packs/day: 1.50     Years: 30.00     Pack years: 45.00     Types: Cigarettes    Smokeless tobacco: Never   Substance Use Topics    Alcohol use: Yes     Comment: beer     Drug use: No      Past Surgical History:   Procedure Laterality Date    COLONOSCOPY  12/05/2016    JOINT REPLACEMENT Left     knee    KNEE ARTHROPLASTY       Family History   Problem Relation Age of Onset    Heart Disease Mother     Cancer Mother         Brain    Cancer Father         colon, Pancreatic    Heart Attack Father     Diabetes Father      Past Medical History:   Diagnosis Date    Abscess     Hyperlipidemia     Hypertension     Impaired fasting glucose     Psoriasis        Vitals:    11/09/22 1508   BP: (!) 146/86   Pulse: 86 Temp: 97.2 °F (36.2 °C)   SpO2: 96%   Weight: 192 lb (87.1 kg)   Height: 5' 6\" (1.676 m)     BP Readings from Last 3 Encounters:   11/09/22 (!) 146/86   08/05/22 138/76   03/29/22 130/80        Physical Exam  Vitals and nursing note reviewed. Constitutional:       Appearance: He is well-developed. HENT:      Head: Normocephalic. Right Ear: External ear normal.      Left Ear: External ear normal.      Nose: Nose normal.   Eyes:      Conjunctiva/sclera: Conjunctivae normal.      Pupils: Pupils are equal, round, and reactive to light. Cardiovascular:      Rate and Rhythm: Normal rate. Pulmonary:      Breath sounds: Normal breath sounds. Abdominal:      General: Bowel sounds are normal.      Palpations: Abdomen is soft. Musculoskeletal:         General: Normal range of motion. Cervical back: Normal range of motion and neck supple. Skin:     General: Skin is warm and dry. Neurological:      Mental Status: He is alert and oriented to person, place, and time. Psychiatric:         Behavior: Behavior normal.      Today's vitals physical examination stable. Medications as prescribed. Follow-up visit with me 3 months sooner if problems. Blood work at that time . orthopedic referral today. Plan Per Assessment:  Lacy Guy was seen today for diabetes, hypertension and discuss labs. Diagnoses and all orders for this visit:    Primary hypertension  -     Comprehensive Metabolic Panel; Future  -     Lipid Panel; Future  -     Hemoglobin A1C; Future    Impaired fasting glucose  -     Comprehensive Metabolic Panel; Future  -     Lipid Panel; Future  -     Hemoglobin A1C; Future    Hyperlipidemia, unspecified hyperlipidemia type  -     Comprehensive Metabolic Panel; Future  -     Lipid Panel;  Future  -     Hemoglobin A1C; Future    Smoker    Psoriasis    Primary osteoarthritis of left knee  -     External Referral To Orthopedic Surgery            Return in about 3 months (around 2/9/2023). Chel Rivers DO    Note was generated with the assistance of voice recognition software. Document was reviewed however may contain grammatical errors.

## 2023-01-17 RX ORDER — PRAVASTATIN SODIUM 40 MG
TABLET ORAL
Qty: 90 TABLET | Refills: 3 | Status: SHIPPED | OUTPATIENT
Start: 2023-01-17

## 2023-01-17 RX ORDER — AMLODIPINE BESYLATE 5 MG/1
TABLET ORAL
Qty: 90 TABLET | Refills: 3 | Status: SHIPPED | OUTPATIENT
Start: 2023-01-17

## 2023-01-17 RX ORDER — METFORMIN HYDROCHLORIDE 500 MG/1
TABLET, EXTENDED RELEASE ORAL
Qty: 90 TABLET | Refills: 3 | Status: SHIPPED | OUTPATIENT
Start: 2023-01-17

## 2023-02-04 ENCOUNTER — HOSPITAL ENCOUNTER (OUTPATIENT)
Age: 63
Discharge: HOME | End: 2023-02-04
Payer: COMMERCIAL

## 2023-02-04 DIAGNOSIS — R73.01 IMPAIRED FASTING GLUCOSE: ICD-10-CM

## 2023-02-04 DIAGNOSIS — I10 PRIMARY HYPERTENSION: ICD-10-CM

## 2023-02-04 DIAGNOSIS — E78.5 HYPERLIPIDEMIA, UNSPECIFIED HYPERLIPIDEMIA TYPE: ICD-10-CM

## 2023-02-04 LAB
ALBUMIN SERPL-MCNC: 4.4 G/DL (ref 3.5–5.2)
ALP BLD-CCNC: 93 U/L (ref 40–129)
ALT SERPL-CCNC: 17 U/L (ref 0–40)
ANION GAP SERPL CALCULATED.3IONS-SCNC: 12 MMOL/L (ref 7–16)
AST SERPL-CCNC: 14 U/L (ref 0–39)
BILIRUB SERPL-MCNC: 0.4 MG/DL (ref 0–1.2)
BUN BLDV-MCNC: 15 MG/DL (ref 6–23)
CALCIUM SERPL-MCNC: 9.5 MG/DL (ref 8.6–10.2)
CHLORIDE BLD-SCNC: 102 MMOL/L (ref 98–107)
CHOLESTEROL, TOTAL: 172 MG/DL (ref 0–199)
CO2: 28 MMOL/L (ref 22–29)
CREAT SERPL-MCNC: 0.9 MG/DL (ref 0.7–1.2)
GFR SERPL CREATININE-BSD FRML MDRD: >60 ML/MIN/1.73
GLUCOSE BLD-MCNC: 126 MG/DL (ref 74–99)
HBA1C MFR BLD: 6.2 % (ref 4–5.6)
HDLC SERPL-MCNC: 41 MG/DL
LDL CHOLESTEROL CALCULATED: 114 MG/DL (ref 0–99)
POTASSIUM SERPL-SCNC: 3.8 MMOL/L (ref 3.5–5)
SODIUM BLD-SCNC: 142 MMOL/L (ref 132–146)
TOTAL PROTEIN: 7.3 G/DL (ref 6.4–8.3)
TRIGL SERPL-MCNC: 84 MG/DL (ref 0–149)
VLDLC SERPL CALC-MCNC: 17 MG/DL

## 2023-02-04 PROCEDURE — 80053 COMPREHEN METABOLIC PANEL: CPT

## 2023-02-04 PROCEDURE — 80061 LIPID PANEL: CPT

## 2023-02-04 PROCEDURE — 83036 HEMOGLOBIN GLYCOSYLATED A1C: CPT

## 2023-02-04 PROCEDURE — 36415 COLL VENOUS BLD VENIPUNCTURE: CPT

## 2023-02-09 ENCOUNTER — OFFICE VISIT (OUTPATIENT)
Dept: PRIMARY CARE CLINIC | Age: 63
End: 2023-02-09
Payer: COMMERCIAL

## 2023-02-09 VITALS
HEIGHT: 66 IN | SYSTOLIC BLOOD PRESSURE: 138 MMHG | TEMPERATURE: 98.2 F | WEIGHT: 195 LBS | OXYGEN SATURATION: 96 % | HEART RATE: 96 BPM | DIASTOLIC BLOOD PRESSURE: 82 MMHG | BODY MASS INDEX: 31.34 KG/M2

## 2023-02-09 DIAGNOSIS — Z12.5 SCREENING PSA (PROSTATE SPECIFIC ANTIGEN): ICD-10-CM

## 2023-02-09 DIAGNOSIS — I10 PRIMARY HYPERTENSION: ICD-10-CM

## 2023-02-09 DIAGNOSIS — R73.01 IMPAIRED FASTING GLUCOSE: ICD-10-CM

## 2023-02-09 DIAGNOSIS — E78.5 HYPERLIPIDEMIA, UNSPECIFIED HYPERLIPIDEMIA TYPE: ICD-10-CM

## 2023-02-09 DIAGNOSIS — M54.50 ACUTE RIGHT-SIDED LOW BACK PAIN WITHOUT SCIATICA: Primary | ICD-10-CM

## 2023-02-09 PROCEDURE — 4004F PT TOBACCO SCREEN RCVD TLK: CPT | Performed by: FAMILY MEDICINE

## 2023-02-09 PROCEDURE — 3017F COLORECTAL CA SCREEN DOC REV: CPT | Performed by: FAMILY MEDICINE

## 2023-02-09 PROCEDURE — 3079F DIAST BP 80-89 MM HG: CPT | Performed by: FAMILY MEDICINE

## 2023-02-09 PROCEDURE — G8417 CALC BMI ABV UP PARAM F/U: HCPCS | Performed by: FAMILY MEDICINE

## 2023-02-09 PROCEDURE — 3075F SYST BP GE 130 - 139MM HG: CPT | Performed by: FAMILY MEDICINE

## 2023-02-09 PROCEDURE — G8484 FLU IMMUNIZE NO ADMIN: HCPCS | Performed by: FAMILY MEDICINE

## 2023-02-09 PROCEDURE — G8427 DOCREV CUR MEDS BY ELIG CLIN: HCPCS | Performed by: FAMILY MEDICINE

## 2023-02-09 PROCEDURE — 99214 OFFICE O/P EST MOD 30 MIN: CPT | Performed by: FAMILY MEDICINE

## 2023-02-09 RX ORDER — BETAMETHASONE DIPROPIONATE 0.5 MG/G
CREAM TOPICAL
COMMUNITY
Start: 2023-01-11

## 2023-02-09 RX ORDER — TACROLIMUS 1 MG/G
OINTMENT TOPICAL
COMMUNITY
Start: 2023-01-19

## 2023-02-09 RX ORDER — METHYLPREDNISOLONE 4 MG/1
TABLET ORAL
Qty: 21 TABLET | Refills: 0 | Status: SHIPPED | OUTPATIENT
Start: 2023-02-09

## 2023-02-09 RX ORDER — TRALOKINUMAB-LDRM 150 MG/ML
INJECTION, SOLUTION SUBCUTANEOUS
COMMUNITY
Start: 2023-01-26

## 2023-02-09 SDOH — ECONOMIC STABILITY: HOUSING INSECURITY
IN THE LAST 12 MONTHS, WAS THERE A TIME WHEN YOU DID NOT HAVE A STEADY PLACE TO SLEEP OR SLEPT IN A SHELTER (INCLUDING NOW)?: NO

## 2023-02-09 SDOH — ECONOMIC STABILITY: INCOME INSECURITY: HOW HARD IS IT FOR YOU TO PAY FOR THE VERY BASICS LIKE FOOD, HOUSING, MEDICAL CARE, AND HEATING?: NOT HARD AT ALL

## 2023-02-09 SDOH — ECONOMIC STABILITY: FOOD INSECURITY: WITHIN THE PAST 12 MONTHS, YOU WORRIED THAT YOUR FOOD WOULD RUN OUT BEFORE YOU GOT MONEY TO BUY MORE.: NEVER TRUE

## 2023-02-09 SDOH — ECONOMIC STABILITY: FOOD INSECURITY: WITHIN THE PAST 12 MONTHS, THE FOOD YOU BOUGHT JUST DIDN'T LAST AND YOU DIDN'T HAVE MONEY TO GET MORE.: NEVER TRUE

## 2023-02-09 ASSESSMENT — ENCOUNTER SYMPTOMS
GASTROINTESTINAL NEGATIVE: 1
EYES NEGATIVE: 1
RESPIRATORY NEGATIVE: 1
ALLERGIC/IMMUNOLOGIC NEGATIVE: 1

## 2023-02-09 ASSESSMENT — PATIENT HEALTH QUESTIONNAIRE - PHQ9
SUM OF ALL RESPONSES TO PHQ QUESTIONS 1-9: 0
SUM OF ALL RESPONSES TO PHQ9 QUESTIONS 1 & 2: 0
2. FEELING DOWN, DEPRESSED OR HOPELESS: 0
SUM OF ALL RESPONSES TO PHQ QUESTIONS 1-9: 0
SUM OF ALL RESPONSES TO PHQ QUESTIONS 1-9: 0
1. LITTLE INTEREST OR PLEASURE IN DOING THINGS: 0
SUM OF ALL RESPONSES TO PHQ QUESTIONS 1-9: 0

## 2023-02-09 NOTE — PROGRESS NOTES
23     Mg Jaffe    : 1960 Sex: male   Age: 57 y.o. Chief Complaint   Patient presents with    Discuss Labs    Back Pain       Prior to Admission medications    Medication Sig Start Date End Date Taking? Authorizing Provider   ADBRY 150 MG/ML SOSY  23  Yes Historical Provider, MD   betamethasone dipropionate 0.05 % cream apply to affected area twice a day 23  Yes Historical Provider, MD   tacrolimus (PROTOPIC) 0.1 % ointment apply to affected area twice a day 23  Yes Historical Provider, MD   metFORMIN (GLUCOPHAGE-XR) 500 MG extended release tablet take 1 tablet by mouth once daily with food 23  Yes Zandra Slim Traikoff, DO   pravastatin (PRAVACHOL) 40 MG tablet take 1 tablet by mouth once daily 23  Yes Zandra Slim Traikoff, DO   amLODIPine (NORVASC) 5 MG tablet take 1 tablet by mouth once daily 23  Yes Zandra Slim Traikoff, DO   hydroCHLOROthiazide (HYDRODIURIL) 25 MG tablet take 1 tablet by mouth every morning 3/21/22  Yes Zandra Slim Traikoff, DO   diclofenac sodium (VOLTAREN) 1 % GEL Apply 4 g topically 4 times daily 20  Yes Lashaun He DPM   triamcinolone (KENALOG) 0.1 % cream Apply topically 2 times daily. 20  Yes LEONARDO Calderon   desoximetasone (TOPICORT) 0.25 % cream Apply topically 2 times daily. 20  Yes Zandra Slim Traikoff, DO   sildenafil (REVATIO) 20 MG tablet 2-3 tabs as needed   Yes Historical Provider, MD          HPI: Patient evaluated today complaints of acute low back discomfort on the right side. No known trauma. Muscular strain and sprain. The addition of Medrol pack and then if persistent symptoms she will call. Hypertension diabetes hyperlipidemia all very stable. Medications reviewed well-tolerated maintain as prescribed. Labs reviewed today and doing very well. A1c is excellent. Review of Systems   Constitutional: Negative. HENT: Negative. Eyes: Negative. Respiratory: Negative.      Gastrointestinal: Negative. Endocrine: Negative. Genitourinary: Negative. Musculoskeletal: Negative. Skin: Negative. Allergic/Immunologic: Negative. Neurological: Negative. Hematological: Negative. Psychiatric/Behavioral: Negative. Today systems review stable medications as prescribed. Current Outpatient Medications:     ADBRY 150 MG/ML SOSZULEMA, , Disp: , Rfl:     betamethasone dipropionate 0.05 % cream, apply to affected area twice a day, Disp: , Rfl:     tacrolimus (PROTOPIC) 0.1 % ointment, apply to affected area twice a day, Disp: , Rfl:     metFORMIN (GLUCOPHAGE-XR) 500 MG extended release tablet, take 1 tablet by mouth once daily with food, Disp: 90 tablet, Rfl: 3    pravastatin (PRAVACHOL) 40 MG tablet, take 1 tablet by mouth once daily, Disp: 90 tablet, Rfl: 3    amLODIPine (NORVASC) 5 MG tablet, take 1 tablet by mouth once daily, Disp: 90 tablet, Rfl: 3    hydroCHLOROthiazide (HYDRODIURIL) 25 MG tablet, take 1 tablet by mouth every morning, Disp: 90 tablet, Rfl: 3    diclofenac sodium (VOLTAREN) 1 % GEL, Apply 4 g topically 4 times daily, Disp: 4 Tube, Rfl: 1    triamcinolone (KENALOG) 0.1 % cream, Apply topically 2 times daily. , Disp: 80 g, Rfl: 1    desoximetasone (TOPICORT) 0.25 % cream, Apply topically 2 times daily. , Disp: 30 g, Rfl: 1    sildenafil (REVATIO) 20 MG tablet, 2-3 tabs as needed, Disp: , Rfl:     Allergies   Allergen Reactions    Mobic [Meloxicam] Hives       Social History     Tobacco Use    Smoking status: Heavy Smoker     Packs/day: 1.50     Years: 30.00     Pack years: 45.00     Types: Cigarettes    Smokeless tobacco: Never   Substance Use Topics    Alcohol use: Yes     Comment: beer     Drug use: No      Past Surgical History:   Procedure Laterality Date    COLONOSCOPY  12/05/2016    JOINT REPLACEMENT Left     knee    KNEE ARTHROPLASTY       Family History   Problem Relation Age of Onset    Heart Disease Mother     Cancer Mother         Brain    Cancer Father colon, Pancreatic    Heart Attack Father     Diabetes Father      Past Medical History:   Diagnosis Date    Abscess     Hyperlipidemia     Hypertension     Impaired fasting glucose     Psoriasis        Vitals:    02/09/23 1632   BP: 138/82   Pulse: 96   Temp: 98.2 °F (36.8 °C)   SpO2: 96%   Weight: 195 lb (88.5 kg)   Height: 5' 6\" (1.676 m)     BP Readings from Last 3 Encounters:   02/09/23 138/82   11/09/22 (!) 146/86   08/05/22 138/76        Physical Exam  Vitals and nursing note reviewed. Constitutional:       Appearance: He is well-developed. HENT:      Head: Normocephalic. Right Ear: External ear normal.      Left Ear: External ear normal.      Nose: Nose normal.   Eyes:      Conjunctiva/sclera: Conjunctivae normal.      Pupils: Pupils are equal, round, and reactive to light. Cardiovascular:      Rate and Rhythm: Normal rate. Pulmonary:      Breath sounds: Normal breath sounds. Abdominal:      General: Bowel sounds are normal.      Palpations: Abdomen is soft. Musculoskeletal:         General: Normal range of motion. Cervical back: Normal range of motion and neck supple. Skin:     General: Skin is warm and dry. Neurological:      Mental Status: He is alert and oriented to person, place, and time. Psychiatric:         Behavior: Behavior normal.      Blood pressure well controlled. Heart is regular lungs are clear. Low back strain and sprain and recommending Medrol pack and then follow-up if persistent. Cautioned him with his blood sugars. A1c at 6.2 and excellent control. Lipids well managed. We will sit tight with current meds and care 3-month follow-up and sooner if problems. Blood work again at that time.       Lab Results   Component Value Date    TSH 2.130 07/18/2022    TSH 1.850 03/19/2022    D2KJTVZ 8.2 07/18/2022    N2ODNWK 7.6 03/19/2022     Lab Results   Component Value Date    CHOL 172 02/04/2023    CHOL 160 11/05/2022     Lab Results   Component Value Date    TRIG 84 02/04/2023    TRIG 93 11/05/2022     Lab Results   Component Value Date    HDL 41 02/04/2023    HDL 37 11/05/2022     No results found for: [facility]  Lab Results   Component Value Date    LABVLDL 17 02/04/2023    LABVLDL 19 11/05/2022     Lab Results   Component Value Date    CHOLHDLRATIO 4.7 02/27/2021    CHOLHDLRATIO 4.9 01/18/2020     Lab Results   Component Value Date    WBC 4.6 07/18/2022    HGB 14.8 07/18/2022    HCT 43.5 07/18/2022    MCV 84.1 07/18/2022     07/18/2022    LYMPHOPCT 26.3 07/18/2022    RBC 5.17 07/18/2022    MCH 28.6 07/18/2022    MCHC 34.0 07/18/2022    RDW 13.4 07/18/2022     Lab Results   Component Value Date     02/04/2023    K 3.8 02/04/2023     02/04/2023    CO2 28 02/04/2023    BUN 15 02/04/2023    CREATININE 0.9 02/04/2023    GLUCOSE 126 (H) 02/04/2023    CALCIUM 9.5 02/04/2023    PROT 7.3 02/04/2023    LABALBU 4.4 02/04/2023    BILITOT 0.4 02/04/2023    ALKPHOS 93 02/04/2023    AST 14 02/04/2023    ALT 17 02/04/2023    LABGLOM >60 02/04/2023    GFRAA >60 07/18/2022        Lab Results   Component Value Date    PSA 1.94 03/19/2022      Lab Results   Component Value Date    LABA1C 6.2 (H) 02/04/2023    LABA1C 6.2 (H) 11/05/2022    LABA1C 6.1 (H) 07/18/2022     No results found for: EAG      Plan Per Assessment:  There are no diagnoses linked to this encounter. No follow-ups on file. Casimer Half, DO    Note was generated with the assistance of voice recognition software. Document was reviewed however may contain grammatical errors.

## 2023-03-11 ENCOUNTER — HOSPITAL ENCOUNTER (OUTPATIENT)
Age: 63
End: 2023-03-11
Payer: COMMERCIAL

## 2023-03-11 ENCOUNTER — HOSPITAL ENCOUNTER (OUTPATIENT)
Dept: GENERAL RADIOLOGY | Age: 63
End: 2023-03-11
Payer: COMMERCIAL

## 2023-03-11 DIAGNOSIS — M54.59 OTHER LOW BACK PAIN: ICD-10-CM

## 2023-03-11 PROCEDURE — 72100 X-RAY EXAM L-S SPINE 2/3 VWS: CPT

## 2023-03-11 PROCEDURE — 72170 X-RAY EXAM OF PELVIS: CPT

## 2023-03-20 RX ORDER — HYDROCHLOROTHIAZIDE 25 MG/1
25 TABLET ORAL EVERY MORNING
Qty: 90 TABLET | Refills: 3 | Status: SHIPPED | OUTPATIENT
Start: 2023-03-20

## 2023-05-06 ENCOUNTER — HOSPITAL ENCOUNTER (OUTPATIENT)
Age: 63
Discharge: HOME | End: 2023-05-06
Payer: COMMERCIAL

## 2023-05-06 DIAGNOSIS — Z12.5 SCREENING PSA (PROSTATE SPECIFIC ANTIGEN): ICD-10-CM

## 2023-05-06 DIAGNOSIS — I10 PRIMARY HYPERTENSION: ICD-10-CM

## 2023-05-06 DIAGNOSIS — R73.01 IMPAIRED FASTING GLUCOSE: ICD-10-CM

## 2023-05-06 DIAGNOSIS — E78.5 HYPERLIPIDEMIA, UNSPECIFIED HYPERLIPIDEMIA TYPE: ICD-10-CM

## 2023-05-06 LAB
ALBUMIN SERPL-MCNC: 4.2 G/DL (ref 3.5–5.2)
ALP SERPL-CCNC: 108 U/L (ref 40–129)
ALT SERPL-CCNC: 17 U/L (ref 0–40)
ANION GAP SERPL CALCULATED.3IONS-SCNC: 14 MMOL/L (ref 7–16)
AST SERPL-CCNC: 14 U/L (ref 0–39)
BASOPHILS # BLD: 0.02 E9/L (ref 0–0.2)
BASOPHILS NFR BLD: 0.4 % (ref 0–2)
BILIRUB SERPL-MCNC: 0.3 MG/DL (ref 0–1.2)
BUN SERPL-MCNC: 21 MG/DL (ref 6–23)
CALCIUM SERPL-MCNC: 9.8 MG/DL (ref 8.6–10.2)
CHLORIDE SERPL-SCNC: 103 MMOL/L (ref 98–107)
CHOLESTEROL, TOTAL: 175 MG/DL (ref 0–199)
CO2 SERPL-SCNC: 26 MMOL/L (ref 22–29)
CREAT SERPL-MCNC: 0.8 MG/DL (ref 0.7–1.2)
EOSINOPHIL # BLD: 0.14 E9/L (ref 0.05–0.5)
EOSINOPHIL NFR BLD: 3 % (ref 0–6)
ERYTHROCYTE [DISTWIDTH] IN BLOOD BY AUTOMATED COUNT: 13.4 FL (ref 11.5–15)
GLUCOSE SERPL-MCNC: 147 MG/DL (ref 74–99)
HBA1C MFR BLD: 6.2 % (ref 4–5.6)
HCT VFR BLD AUTO: 45.7 % (ref 37–54)
HDLC SERPL-MCNC: 38 MG/DL
HGB BLD-MCNC: 15.4 G/DL (ref 12.5–16.5)
IMM GRANULOCYTES # BLD: 0.01 E9/L
IMM GRANULOCYTES NFR BLD: 0.2 % (ref 0–5)
LDLC SERPL CALC-MCNC: 122 MG/DL (ref 0–99)
LYMPHOCYTES # BLD: 1.15 E9/L (ref 1.5–4)
LYMPHOCYTES NFR BLD: 24.7 % (ref 20–42)
MCH RBC QN AUTO: 29.1 PG (ref 26–35)
MCHC RBC AUTO-ENTMCNC: 33.7 % (ref 32–34.5)
MCV RBC AUTO: 86.2 FL (ref 80–99.9)
MONOCYTES # BLD: 0.42 E9/L (ref 0.1–0.95)
MONOCYTES NFR BLD: 9 % (ref 2–12)
NEUTROPHILS # BLD: 2.91 E9/L (ref 1.8–7.3)
NEUTS SEG NFR BLD: 62.7 % (ref 43–80)
PLATELET # BLD AUTO: 148 E9/L (ref 130–450)
PMV BLD AUTO: 9.2 FL (ref 7–12)
POTASSIUM SERPL-SCNC: 3.9 MMOL/L (ref 3.5–5)
PROT SERPL-MCNC: 7.1 G/DL (ref 6.4–8.3)
PSA SERPL-MCNC: 1.76 NG/ML (ref 0–4)
RBC # BLD AUTO: 5.3 E12/L (ref 3.8–5.8)
SODIUM SERPL-SCNC: 143 MMOL/L (ref 132–146)
T4 SERPL-MCNC: 8.9 MCG/DL (ref 4.5–11.7)
TRIGL SERPL-MCNC: 77 MG/DL (ref 0–149)
TSH SERPL-MCNC: 1.65 UIU/ML (ref 0.27–4.2)
VLDLC SERPL CALC-MCNC: 15 MG/DL
WBC # BLD: 4.7 E9/L (ref 4.5–11.5)

## 2023-05-06 PROCEDURE — 80061 LIPID PANEL: CPT

## 2023-05-06 PROCEDURE — 80053 COMPREHEN METABOLIC PANEL: CPT

## 2023-05-06 PROCEDURE — 84443 ASSAY THYROID STIM HORMONE: CPT

## 2023-05-06 PROCEDURE — 36415 COLL VENOUS BLD VENIPUNCTURE: CPT

## 2023-05-06 PROCEDURE — 83036 HEMOGLOBIN GLYCOSYLATED A1C: CPT

## 2023-05-06 PROCEDURE — 85025 COMPLETE CBC W/AUTO DIFF WBC: CPT

## 2023-05-06 PROCEDURE — G0103 PSA SCREENING: HCPCS

## 2023-05-06 PROCEDURE — 84436 ASSAY OF TOTAL THYROXINE: CPT

## 2023-05-11 ENCOUNTER — OFFICE VISIT (OUTPATIENT)
Dept: PRIMARY CARE CLINIC | Age: 63
End: 2023-05-11
Payer: COMMERCIAL

## 2023-05-11 VITALS
DIASTOLIC BLOOD PRESSURE: 82 MMHG | SYSTOLIC BLOOD PRESSURE: 146 MMHG | TEMPERATURE: 98.8 F | OXYGEN SATURATION: 96 % | BODY MASS INDEX: 31.64 KG/M2 | HEART RATE: 96 BPM | WEIGHT: 196 LBS

## 2023-05-11 DIAGNOSIS — I10 PRIMARY HYPERTENSION: ICD-10-CM

## 2023-05-11 DIAGNOSIS — Z87.898 HISTORY OF NOCTURIA: ICD-10-CM

## 2023-05-11 DIAGNOSIS — I73.9 PVD (PERIPHERAL VASCULAR DISEASE) (HCC): ICD-10-CM

## 2023-05-11 DIAGNOSIS — M51.36 DISC DEGENERATION, LUMBAR: Primary | ICD-10-CM

## 2023-05-11 PROBLEM — M51.369 DISC DEGENERATION, LUMBAR: Status: ACTIVE | Noted: 2023-05-11

## 2023-05-11 PROCEDURE — 3017F COLORECTAL CA SCREEN DOC REV: CPT | Performed by: FAMILY MEDICINE

## 2023-05-11 PROCEDURE — G8417 CALC BMI ABV UP PARAM F/U: HCPCS | Performed by: FAMILY MEDICINE

## 2023-05-11 PROCEDURE — 3079F DIAST BP 80-89 MM HG: CPT | Performed by: FAMILY MEDICINE

## 2023-05-11 PROCEDURE — G8427 DOCREV CUR MEDS BY ELIG CLIN: HCPCS | Performed by: FAMILY MEDICINE

## 2023-05-11 PROCEDURE — 4004F PT TOBACCO SCREEN RCVD TLK: CPT | Performed by: FAMILY MEDICINE

## 2023-05-11 PROCEDURE — 3077F SYST BP >= 140 MM HG: CPT | Performed by: FAMILY MEDICINE

## 2023-05-11 PROCEDURE — 99214 OFFICE O/P EST MOD 30 MIN: CPT | Performed by: FAMILY MEDICINE

## 2023-05-11 RX ORDER — TAMSULOSIN HCL 0.4 MG
CAPSULE ORAL
Qty: 30 CAPSULE | Refills: 3
Start: 2023-05-11

## 2023-05-11 ASSESSMENT — ENCOUNTER SYMPTOMS
GASTROINTESTINAL NEGATIVE: 1
EYES NEGATIVE: 1
ALLERGIC/IMMUNOLOGIC NEGATIVE: 1
BACK PAIN: 1
RESPIRATORY NEGATIVE: 1

## 2023-05-11 NOTE — PROGRESS NOTES
Packs/day: 1.50     Years: 30.00     Pack years: 45.00     Types: Cigarettes    Smokeless tobacco: Never   Substance Use Topics    Alcohol use: Yes     Comment: beer     Drug use: No      Past Surgical History:   Procedure Laterality Date    COLONOSCOPY  12/05/2016    JOINT REPLACEMENT Left     knee    KNEE ARTHROPLASTY       Family History   Problem Relation Age of Onset    Heart Disease Mother     Cancer Mother         Brain    Cancer Father         colon, Pancreatic    Heart Attack Father     Diabetes Father      Past Medical History:   Diagnosis Date    Abscess     Hyperlipidemia     Hypertension     Impaired fasting glucose     Psoriasis        Vitals:    05/11/23 1602 05/11/23 1604   BP: (!) 150/80 (!) 146/82   Pulse: 96    Temp: 98.8 °F (37.1 °C)    SpO2: 96%    Weight: 196 lb (88.9 kg)      BP Readings from Last 3 Encounters:   05/11/23 (!) 146/82   02/09/23 138/82   11/09/22 (!) 146/86        Physical Exam  Vitals and nursing note reviewed. Constitutional:       Appearance: He is well-developed. HENT:      Head: Normocephalic. Right Ear: External ear normal.      Left Ear: External ear normal.      Nose: Nose normal.   Eyes:      Conjunctiva/sclera: Conjunctivae normal.      Pupils: Pupils are equal, round, and reactive to light. Cardiovascular:      Rate and Rhythm: Normal rate. Pulmonary:      Breath sounds: Normal breath sounds. Abdominal:      General: Bowel sounds are normal.      Palpations: Abdomen is soft. Musculoskeletal:         General: Normal range of motion. Cervical back: Normal range of motion and neck supple. Skin:     General: Skin is warm and dry. Neurological:      Mental Status: He is alert and oriented to person, place, and time. Psychiatric:         Behavior: Behavior normal.      Present vitals physical exam stable. Heart regular lungs clear.   X-ray reviewed and will probably require further imaging and is going to follow-up with [facility] pain clinic on

## 2023-05-27 ENCOUNTER — HOSPITAL ENCOUNTER (OUTPATIENT)
Age: 63
Discharge: HOME | End: 2023-05-27

## 2023-06-08 ENCOUNTER — OFFICE VISIT (OUTPATIENT)
Dept: PRIMARY CARE CLINIC | Age: 63
End: 2023-06-08
Payer: COMMERCIAL

## 2023-06-08 VITALS
SYSTOLIC BLOOD PRESSURE: 138 MMHG | OXYGEN SATURATION: 96 % | WEIGHT: 193 LBS | DIASTOLIC BLOOD PRESSURE: 82 MMHG | BODY MASS INDEX: 31.15 KG/M2 | HEART RATE: 72 BPM | TEMPERATURE: 97.7 F

## 2023-06-08 DIAGNOSIS — E78.5 HYPERLIPIDEMIA, UNSPECIFIED HYPERLIPIDEMIA TYPE: ICD-10-CM

## 2023-06-08 DIAGNOSIS — I73.9 PVD (PERIPHERAL VASCULAR DISEASE) (HCC): ICD-10-CM

## 2023-06-08 DIAGNOSIS — I10 PRIMARY HYPERTENSION: ICD-10-CM

## 2023-06-08 DIAGNOSIS — M51.36 DISC DEGENERATION, LUMBAR: Primary | ICD-10-CM

## 2023-06-08 DIAGNOSIS — R73.01 IMPAIRED FASTING GLUCOSE: ICD-10-CM

## 2023-06-08 LAB
ALBUMIN SERPL-MCNC: 4.5 G/DL (ref 3.5–5.2)
ALP SERPL-CCNC: 107 U/L (ref 40–129)
ALT SERPL-CCNC: 16 U/L (ref 0–40)
ANION GAP SERPL CALCULATED.3IONS-SCNC: 11 MMOL/L (ref 7–16)
AST SERPL-CCNC: 18 U/L (ref 0–39)
BASOPHILS # BLD: 0.01 E9/L (ref 0–0.2)
BASOPHILS NFR BLD: 0.1 % (ref 0–2)
BILIRUB SERPL-MCNC: 0.5 MG/DL (ref 0–1.2)
BUN SERPL-MCNC: 15 MG/DL (ref 6–23)
CALCIUM SERPL-MCNC: 10.4 MG/DL (ref 8.6–10.2)
CHLORIDE SERPL-SCNC: 100 MMOL/L (ref 98–107)
CO2 SERPL-SCNC: 28 MMOL/L (ref 22–29)
CREAT SERPL-MCNC: 0.8 MG/DL (ref 0.7–1.2)
EOSINOPHIL # BLD: 0.15 E9/L (ref 0.05–0.5)
EOSINOPHIL NFR BLD: 2.2 % (ref 0–6)
ERYTHROCYTE [DISTWIDTH] IN BLOOD BY AUTOMATED COUNT: 13.6 FL (ref 11.5–15)
GLUCOSE SERPL-MCNC: 115 MG/DL (ref 74–99)
HCT VFR BLD AUTO: 51.2 % (ref 37–54)
HGB BLD-MCNC: 16.3 G/DL (ref 12.5–16.5)
IMM GRANULOCYTES # BLD: 0.02 E9/L
IMM GRANULOCYTES NFR BLD: 0.3 % (ref 0–5)
LYMPHOCYTES # BLD: 1.53 E9/L (ref 1.5–4)
LYMPHOCYTES NFR BLD: 22.2 % (ref 20–42)
MCH RBC QN AUTO: 28.5 PG (ref 26–35)
MCHC RBC AUTO-ENTMCNC: 31.8 % (ref 32–34.5)
MCV RBC AUTO: 89.5 FL (ref 80–99.9)
MONOCYTES # BLD: 0.54 E9/L (ref 0.1–0.95)
MONOCYTES NFR BLD: 7.8 % (ref 2–12)
NEUTROPHILS # BLD: 4.64 E9/L (ref 1.8–7.3)
NEUTS SEG NFR BLD: 67.4 % (ref 43–80)
PLATELET # BLD AUTO: 178 E9/L (ref 130–450)
PMV BLD AUTO: 10.1 FL (ref 7–12)
POTASSIUM SERPL-SCNC: 4.2 MMOL/L (ref 3.5–5)
PROT SERPL-MCNC: 7.7 G/DL (ref 6.4–8.3)
RBC # BLD AUTO: 5.72 E12/L (ref 3.8–5.8)
SODIUM SERPL-SCNC: 139 MMOL/L (ref 132–146)
WBC # BLD: 6.9 E9/L (ref 4.5–11.5)

## 2023-06-08 PROCEDURE — G8417 CALC BMI ABV UP PARAM F/U: HCPCS | Performed by: FAMILY MEDICINE

## 2023-06-08 PROCEDURE — 4004F PT TOBACCO SCREEN RCVD TLK: CPT | Performed by: FAMILY MEDICINE

## 2023-06-08 PROCEDURE — G8427 DOCREV CUR MEDS BY ELIG CLIN: HCPCS | Performed by: FAMILY MEDICINE

## 2023-06-08 PROCEDURE — 3075F SYST BP GE 130 - 139MM HG: CPT | Performed by: FAMILY MEDICINE

## 2023-06-08 PROCEDURE — 3079F DIAST BP 80-89 MM HG: CPT | Performed by: FAMILY MEDICINE

## 2023-06-08 PROCEDURE — 3017F COLORECTAL CA SCREEN DOC REV: CPT | Performed by: FAMILY MEDICINE

## 2023-06-08 PROCEDURE — 99214 OFFICE O/P EST MOD 30 MIN: CPT | Performed by: FAMILY MEDICINE

## 2023-06-08 NOTE — PROGRESS NOTES
02/04/2023     Lab Results   Component Value Date    TRIG 77 05/06/2023    TRIG 84 02/04/2023     Lab Results   Component Value Date    HDL 38 05/06/2023    HDL 41 02/04/2023     No results found for: TIMOTHY [facility]  Lab Results   Component Value Date    LABVLDL 15 05/06/2023    LABVLDL 17 02/04/2023     Lab Results   Component Value Date    CHOLHDLRATIO 4.7 02/27/2021    CHOLHDLRATIO 4.9 01/18/2020     Lab Results   Component Value Date    WBC 4.7 05/06/2023    HGB 15.4 05/06/2023    HCT 45.7 05/06/2023    MCV 86.2 05/06/2023     05/06/2023    LYMPHOPCT 24.7 05/06/2023    RBC 5.30 05/06/2023    MCH 29.1 05/06/2023    MCHC 33.7 05/06/2023    RDW 13.4 05/06/2023     Lab Results   Component Value Date     05/06/2023    K 3.9 05/06/2023     05/06/2023    CO2 26 05/06/2023    BUN 21 05/06/2023    CREATININE 0.8 05/06/2023    GLUCOSE 147 (H) 05/06/2023    CALCIUM 9.8 05/06/2023    PROT 7.1 05/06/2023    LABALBU 4.2 05/06/2023    BILITOT 0.3 05/06/2023    ALKPHOS 108 05/06/2023    AST 14 05/06/2023    ALT 17 05/06/2023    LABGLOM >60 05/06/2023    GFRAA >60 07/18/2022        Lab Results   Component Value Date    PSA 1.76 05/06/2023    PSA 1.94 03/19/2022      Lab Results   Component Value Date    LABA1C 6.2 (H) 05/06/2023    LABA1C 6.2 (H) 02/04/2023    LABA1C 6.2 (H) 11/05/2022     No results found for: EAG      Plan Per Assessment:  Rufino Carmichael was seen today for back pain. Diagnoses and all orders for this visit:    Disc degeneration, lumbar    Primary hypertension    PVD (peripheral vascular disease) ([address]    Impaired fasting glucose    Hyperlipidemia, unspecified hyperlipidemia type            No follow-ups on file. Beny Fowler DO    Note was generated with the assistance of voice recognition software. Document was reviewed however may contain grammatical errors.
Lab Results   Component Value Date    CHOLHDLRATIO 4.7 02/27/2021    CHOLHDLRATIO 4.9 01/18/2020     Lab Results   Component Value Date    WBC 4.7 05/06/2023    HGB 15.4 05/06/2023    HCT 45.7 05/06/2023    MCV 86.2 05/06/2023     05/06/2023    LYMPHOPCT 24.7 05/06/2023    RBC 5.30 05/06/2023    MCH 29.1 05/06/2023    MCHC 33.7 05/06/2023    RDW 13.4 05/06/2023     Lab Results   Component Value Date     05/06/2023    K 3.9 05/06/2023     05/06/2023    CO2 26 05/06/2023    BUN 21 05/06/2023    CREATININE 0.8 05/06/2023    GLUCOSE 147 (H) 05/06/2023    CALCIUM 9.8 05/06/2023    PROT 7.1 05/06/2023    LABALBU 4.2 05/06/2023    BILITOT 0.3 05/06/2023    ALKPHOS 108 05/06/2023    AST 14 05/06/2023    ALT 17 05/06/2023    LABGLOM >60 05/06/2023    GFRAA >60 07/18/2022        Lab Results   Component Value Date    PSA 1.76 05/06/2023    PSA 1.94 03/19/2022      Lab Results   Component Value Date    LABA1C 6.2 (H) 05/06/2023    LABA1C 6.2 (H) 02/04/2023    LABA1C 6.2 (H) 11/05/2022     No results found for: EAG          Plan Per Assessment:  Madeline Mcgowan was seen today for back pain. Diagnoses and all orders for this visit:    Disc degeneration, lumbar    Primary hypertension  -     Comprehensive Metabolic Panel; Future  -     Hemoglobin A1C; Future  -     Lipid Panel; Future    PVD (peripheral vascular disease) (HCC)    Impaired fasting glucose  -     Comprehensive Metabolic Panel; Future  -     Hemoglobin A1C; Future  -     Lipid Panel; Future    Hyperlipidemia, unspecified hyperlipidemia type  -     Comprehensive Metabolic Panel; Future  -     Hemoglobin A1C; Future  -     Lipid Panel; Future            Return in about 3 months (around 9/8/2023). Jovany Macias DO    Note was generated with the assistance of voice recognition software. Document was reviewed however may contain grammatical errors.

## 2023-06-11 LAB
COMMENT: NORMAL
REPORT: NORMAL

## 2023-07-31 ENCOUNTER — OFFICE VISIT (OUTPATIENT)
Dept: PRIMARY CARE CLINIC | Age: 63
End: 2023-07-31
Payer: COMMERCIAL

## 2023-07-31 VITALS
TEMPERATURE: 98.4 F | OXYGEN SATURATION: 96 % | DIASTOLIC BLOOD PRESSURE: 80 MMHG | SYSTOLIC BLOOD PRESSURE: 138 MMHG | HEART RATE: 95 BPM

## 2023-07-31 DIAGNOSIS — I10 PRIMARY HYPERTENSION: ICD-10-CM

## 2023-07-31 DIAGNOSIS — R22.32 AXILLARY MASS, LEFT: Primary | ICD-10-CM

## 2023-07-31 PROCEDURE — G8427 DOCREV CUR MEDS BY ELIG CLIN: HCPCS | Performed by: FAMILY MEDICINE

## 2023-07-31 PROCEDURE — 3075F SYST BP GE 130 - 139MM HG: CPT | Performed by: FAMILY MEDICINE

## 2023-07-31 PROCEDURE — 4004F PT TOBACCO SCREEN RCVD TLK: CPT | Performed by: FAMILY MEDICINE

## 2023-07-31 PROCEDURE — G8417 CALC BMI ABV UP PARAM F/U: HCPCS | Performed by: FAMILY MEDICINE

## 2023-07-31 PROCEDURE — 3079F DIAST BP 80-89 MM HG: CPT | Performed by: FAMILY MEDICINE

## 2023-07-31 PROCEDURE — 99214 OFFICE O/P EST MOD 30 MIN: CPT | Performed by: FAMILY MEDICINE

## 2023-07-31 PROCEDURE — 3017F COLORECTAL CA SCREEN DOC REV: CPT | Performed by: FAMILY MEDICINE

## 2023-07-31 RX ORDER — METHYLPREDNISOLONE 4 MG/1
TABLET ORAL
Qty: 21 TABLET | Refills: 0 | Status: SHIPPED | OUTPATIENT
Start: 2023-07-31

## 2023-07-31 RX ORDER — AMOXICILLIN AND CLAVULANATE POTASSIUM 875; 125 MG/1; MG/1
1 TABLET, FILM COATED ORAL 2 TIMES DAILY
Qty: 20 TABLET | Refills: 0 | Status: SHIPPED | OUTPATIENT
Start: 2023-07-31 | End: 2023-08-10

## 2023-07-31 RX ORDER — SECUKINUMAB 150 MG/ML
INJECTION SUBCUTANEOUS
COMMUNITY
Start: 2023-07-25

## 2023-07-31 NOTE — PROGRESS NOTES
motion and neck supple. Skin:     General: Skin is warm and dry. Neurological:      Mental Status: He is alert and oriented to person, place, and time. Psychiatric:         Behavior: Behavior normal.      Today's vitals physical exam stable. Heart regular lungs are clear. Medications as prescribed. Follow-up visit with me next week and sooner if problems. Plan Per Assessment:  Arnulfo Fonseca was seen today for abscess and shoulder pain. Diagnoses and all orders for this visit:    Axillary mass, left  -     US SOFT TISSUE LIMITED AREA; Future    Primary hypertension    Other orders  -     amoxicillin-clavulanate (AUGMENTIN) 875-125 MG per tablet; Take 1 tablet by mouth 2 times daily for 10 days  -     methylPREDNISolone (MEDROL DOSEPACK) 4 MG tablet; Take by mouth as directed. Return in about 1 week (around 8/7/2023). Benny Neal DO    Note was generated with the assistance of voice recognition software. Document was reviewed however may contain grammatical errors.

## 2023-08-07 ENCOUNTER — OFFICE VISIT (OUTPATIENT)
Dept: PRIMARY CARE CLINIC | Age: 63
End: 2023-08-07
Payer: COMMERCIAL

## 2023-08-07 VITALS
TEMPERATURE: 98.6 F | OXYGEN SATURATION: 97 % | SYSTOLIC BLOOD PRESSURE: 140 MMHG | HEART RATE: 93 BPM | DIASTOLIC BLOOD PRESSURE: 76 MMHG

## 2023-08-07 DIAGNOSIS — R22.32 AXILLARY MASS, LEFT: Primary | ICD-10-CM

## 2023-08-07 DIAGNOSIS — L08.9 SKIN INFECTION: ICD-10-CM

## 2023-08-07 DIAGNOSIS — I10 PRIMARY HYPERTENSION: ICD-10-CM

## 2023-08-07 PROCEDURE — 99214 OFFICE O/P EST MOD 30 MIN: CPT | Performed by: FAMILY MEDICINE

## 2023-08-07 PROCEDURE — 3017F COLORECTAL CA SCREEN DOC REV: CPT | Performed by: FAMILY MEDICINE

## 2023-08-07 PROCEDURE — 3078F DIAST BP <80 MM HG: CPT | Performed by: FAMILY MEDICINE

## 2023-08-07 PROCEDURE — 3077F SYST BP >= 140 MM HG: CPT | Performed by: FAMILY MEDICINE

## 2023-08-07 PROCEDURE — G8417 CALC BMI ABV UP PARAM F/U: HCPCS | Performed by: FAMILY MEDICINE

## 2023-08-07 PROCEDURE — G8427 DOCREV CUR MEDS BY ELIG CLIN: HCPCS | Performed by: FAMILY MEDICINE

## 2023-08-07 PROCEDURE — 4004F PT TOBACCO SCREEN RCVD TLK: CPT | Performed by: FAMILY MEDICINE

## 2023-08-07 NOTE — PROGRESS NOTES
23     Mg Jaffe    : 1960 Sex: male   Age: 57 y.o. Chief Complaint   Patient presents with    Results     Follow up u/s       Prior to Admission medications    Medication Sig Start Date End Date Taking? Authorizing Provider   COSENTYX SENSOREADY, 300 MG, 150 MG/ML SOAJ  23  Yes Historical Provider, MD   amoxicillin-clavulanate (AUGMENTIN) 875-125 MG per tablet Take 1 tablet by mouth 2 times daily for 10 days 7/31/23 8/10/23 Yes Ravinder Rader DO   methylPREDNISolone (MEDROL DOSEPACK) 4 MG tablet Take by mouth as directed. 23  Yes Karley Rader DO   FLOMAX 0.4 MG capsule 1 hs 23  Yes Karley Rader DO   hydroCHLOROthiazide (HYDRODIURIL) 25 MG tablet take 1 tablet by mouth every morning 3/20/23  Yes Marline Irvin,    ADBRY 150 MG/ML SOSY  23  Yes Historical Provider, MD   betamethasone dipropionate 0.05 % cream apply to affected area twice a day 23  Yes Historical Provider, MD   tacrolimus (PROTOPIC) 0.1 % ointment apply to affected area twice a day 23  Yes Historical Provider, MD   metFORMIN (GLUCOPHAGE-XR) 500 MG extended release tablet take 1 tablet by mouth once daily with food 23  Yes Karley Rader DO   pravastatin (PRAVACHOL) 40 MG tablet take 1 tablet by mouth once daily 23  Yes Karley Rader DO   amLODIPine (NORVASC) 5 MG tablet take 1 tablet by mouth once daily 23  Yes Karley Rader DO   diclofenac sodium (VOLTAREN) 1 % GEL Apply 4 g topically 4 times daily 20  Yes Jh Ascencio DPM   triamcinolone (KENALOG) 0.1 % cream Apply topically 2 times daily. 20  Yes Nathanel Carrel, PA   desoximetasone (TOPICORT) 0.25 % cream Apply topically 2 times daily. 20  Yes Karley Camera Traikoff, DO   sildenafil (REVATIO) 20 MG tablet 2-3 tabs as needed   Yes Historical Provider, MD          HPI: Seen today follow-up on axillary mass on the left side.   Ultrasound study suggested complex cyst but he has had very

## 2023-09-06 ENCOUNTER — HOSPITAL ENCOUNTER (OUTPATIENT)
Age: 63
Discharge: HOME | End: 2023-09-06
Payer: COMMERCIAL

## 2023-09-06 DIAGNOSIS — E78.5 HYPERLIPIDEMIA, UNSPECIFIED HYPERLIPIDEMIA TYPE: ICD-10-CM

## 2023-09-06 DIAGNOSIS — I10 PRIMARY HYPERTENSION: ICD-10-CM

## 2023-09-06 DIAGNOSIS — R73.01 IMPAIRED FASTING GLUCOSE: ICD-10-CM

## 2023-09-06 LAB
ALBUMIN SERPL-MCNC: 4.4 G/DL (ref 3.5–5.2)
ALP SERPL-CCNC: 95 U/L (ref 40–129)
ALT SERPL-CCNC: 16 U/L (ref 0–40)
ANION GAP SERPL CALCULATED.3IONS-SCNC: 9 MMOL/L (ref 7–16)
AST SERPL-CCNC: 14 U/L (ref 0–39)
BILIRUB SERPL-MCNC: 0.4 MG/DL (ref 0–1.2)
BUN SERPL-MCNC: 12 MG/DL (ref 6–23)
CALCIUM SERPL-MCNC: 9.8 MG/DL (ref 8.6–10.2)
CHLORIDE SERPL-SCNC: 105 MMOL/L (ref 98–107)
CHOLEST SERPL-MCNC: 172 MG/DL
CO2 SERPL-SCNC: 30 MMOL/L (ref 22–29)
CREAT SERPL-MCNC: 0.9 MG/DL (ref 0.7–1.2)
GFR SERPL CREATININE-BSD FRML MDRD: >60 ML/MIN/1.73M2
GLUCOSE SERPL-MCNC: 134 MG/DL (ref 74–99)
HBA1C MFR BLD: 6.3 % (ref 4–5.6)
HDLC SERPL-MCNC: 36 MG/DL
LDLC SERPL CALC-MCNC: 118 MG/DL
POTASSIUM SERPL-SCNC: 4.3 MMOL/L (ref 3.5–5)
PROT SERPL-MCNC: 7.6 G/DL (ref 6.4–8.3)
SODIUM SERPL-SCNC: 144 MMOL/L (ref 132–146)
TRIGL SERPL-MCNC: 91 MG/DL
VLDLC SERPL CALC-MCNC: 18 MG/DL

## 2023-09-06 PROCEDURE — 80061 LIPID PANEL: CPT

## 2023-09-06 PROCEDURE — 83036 HEMOGLOBIN GLYCOSYLATED A1C: CPT

## 2023-09-06 PROCEDURE — 80053 COMPREHEN METABOLIC PANEL: CPT

## 2023-09-06 PROCEDURE — 36415 COLL VENOUS BLD VENIPUNCTURE: CPT

## 2023-09-08 ENCOUNTER — OFFICE VISIT (OUTPATIENT)
Dept: PRIMARY CARE CLINIC | Age: 63
End: 2023-09-08
Payer: COMMERCIAL

## 2023-09-08 VITALS
WEIGHT: 191 LBS | SYSTOLIC BLOOD PRESSURE: 144 MMHG | DIASTOLIC BLOOD PRESSURE: 82 MMHG | HEIGHT: 66 IN | TEMPERATURE: 98.5 F | BODY MASS INDEX: 30.7 KG/M2 | OXYGEN SATURATION: 96 % | HEART RATE: 86 BPM

## 2023-09-08 DIAGNOSIS — I10 PRIMARY HYPERTENSION: ICD-10-CM

## 2023-09-08 DIAGNOSIS — J02.9 SORE THROAT: Primary | ICD-10-CM

## 2023-09-08 DIAGNOSIS — R05.1 ACUTE COUGH: ICD-10-CM

## 2023-09-08 DIAGNOSIS — E78.5 HYPERLIPIDEMIA, UNSPECIFIED HYPERLIPIDEMIA TYPE: ICD-10-CM

## 2023-09-08 DIAGNOSIS — R22.32 AXILLARY MASS, LEFT: ICD-10-CM

## 2023-09-08 LAB
Lab: NORMAL
PERFORMING INSTRUMENT: NORMAL
QC PASS/FAIL: NORMAL
SARS-COV-2, POC: NORMAL

## 2023-09-08 PROCEDURE — 3079F DIAST BP 80-89 MM HG: CPT | Performed by: FAMILY MEDICINE

## 2023-09-08 PROCEDURE — 87426 SARSCOV CORONAVIRUS AG IA: CPT | Performed by: FAMILY MEDICINE

## 2023-09-08 PROCEDURE — G8417 CALC BMI ABV UP PARAM F/U: HCPCS | Performed by: FAMILY MEDICINE

## 2023-09-08 PROCEDURE — 4004F PT TOBACCO SCREEN RCVD TLK: CPT | Performed by: FAMILY MEDICINE

## 2023-09-08 PROCEDURE — 99214 OFFICE O/P EST MOD 30 MIN: CPT | Performed by: FAMILY MEDICINE

## 2023-09-08 PROCEDURE — G8427 DOCREV CUR MEDS BY ELIG CLIN: HCPCS | Performed by: FAMILY MEDICINE

## 2023-09-08 PROCEDURE — 3077F SYST BP >= 140 MM HG: CPT | Performed by: FAMILY MEDICINE

## 2023-09-08 PROCEDURE — 3017F COLORECTAL CA SCREEN DOC REV: CPT | Performed by: FAMILY MEDICINE

## 2023-09-08 RX ORDER — PREDNISONE 5 MG/1
TABLET ORAL
Qty: 30 TABLET | Refills: 0 | Status: SHIPPED | OUTPATIENT
Start: 2023-09-08

## 2023-09-08 RX ORDER — AZITHROMYCIN 250 MG/1
TABLET, FILM COATED ORAL
Qty: 1 PACKET | Refills: 0 | Status: SHIPPED | OUTPATIENT
Start: 2023-09-08

## 2023-09-08 ASSESSMENT — ENCOUNTER SYMPTOMS
RESPIRATORY NEGATIVE: 1
GASTROINTESTINAL NEGATIVE: 1
EYES NEGATIVE: 1
SORE THROAT: 1
ALLERGIC/IMMUNOLOGIC NEGATIVE: 1

## 2023-09-18 RX ORDER — AZITHROMYCIN 250 MG/1
TABLET, FILM COATED ORAL
Qty: 1 PACKET | Refills: 0 | Status: SHIPPED | OUTPATIENT
Start: 2023-09-18

## 2023-10-08 PROBLEM — J02.9 SORE THROAT: Status: RESOLVED | Noted: 2023-09-08 | Resolved: 2023-10-08

## 2023-10-10 ENCOUNTER — OFFICE VISIT (OUTPATIENT)
Dept: PRIMARY CARE CLINIC | Age: 63
End: 2023-10-10
Payer: COMMERCIAL

## 2023-10-10 VITALS
BODY MASS INDEX: 31.47 KG/M2 | SYSTOLIC BLOOD PRESSURE: 140 MMHG | WEIGHT: 195 LBS | DIASTOLIC BLOOD PRESSURE: 82 MMHG | OXYGEN SATURATION: 97 % | TEMPERATURE: 97.8 F | HEART RATE: 65 BPM | RESPIRATION RATE: 17 BRPM

## 2023-10-10 DIAGNOSIS — I73.9 PVD (PERIPHERAL VASCULAR DISEASE) (HCC): Primary | ICD-10-CM

## 2023-10-10 DIAGNOSIS — R22.32 AXILLARY MASS, LEFT: ICD-10-CM

## 2023-10-10 DIAGNOSIS — R73.01 IMPAIRED FASTING GLUCOSE: ICD-10-CM

## 2023-10-10 DIAGNOSIS — E78.00 PURE HYPERCHOLESTEROLEMIA: ICD-10-CM

## 2023-10-10 DIAGNOSIS — I10 PRIMARY HYPERTENSION: ICD-10-CM

## 2023-10-10 DIAGNOSIS — F17.200 SMOKER: ICD-10-CM

## 2023-10-10 PROCEDURE — 1123F ACP DISCUSS/DSCN MKR DOCD: CPT | Performed by: FAMILY MEDICINE

## 2023-10-10 PROCEDURE — 99214 OFFICE O/P EST MOD 30 MIN: CPT | Performed by: FAMILY MEDICINE

## 2023-10-10 PROCEDURE — 3079F DIAST BP 80-89 MM HG: CPT | Performed by: FAMILY MEDICINE

## 2023-10-10 PROCEDURE — 4004F PT TOBACCO SCREEN RCVD TLK: CPT | Performed by: FAMILY MEDICINE

## 2023-10-10 PROCEDURE — G8484 FLU IMMUNIZE NO ADMIN: HCPCS | Performed by: FAMILY MEDICINE

## 2023-10-10 PROCEDURE — 3017F COLORECTAL CA SCREEN DOC REV: CPT | Performed by: FAMILY MEDICINE

## 2023-10-10 PROCEDURE — G8427 DOCREV CUR MEDS BY ELIG CLIN: HCPCS | Performed by: FAMILY MEDICINE

## 2023-10-10 PROCEDURE — 3077F SYST BP >= 140 MM HG: CPT | Performed by: FAMILY MEDICINE

## 2023-10-10 PROCEDURE — G8417 CALC BMI ABV UP PARAM F/U: HCPCS | Performed by: FAMILY MEDICINE

## 2023-10-10 SDOH — ECONOMIC STABILITY: INCOME INSECURITY: HOW HARD IS IT FOR YOU TO PAY FOR THE VERY BASICS LIKE FOOD, HOUSING, MEDICAL CARE, AND HEATING?: NOT HARD AT ALL

## 2023-10-10 SDOH — ECONOMIC STABILITY: FOOD INSECURITY: WITHIN THE PAST 12 MONTHS, YOU WORRIED THAT YOUR FOOD WOULD RUN OUT BEFORE YOU GOT MONEY TO BUY MORE.: NEVER TRUE

## 2023-10-10 SDOH — ECONOMIC STABILITY: FOOD INSECURITY: WITHIN THE PAST 12 MONTHS, THE FOOD YOU BOUGHT JUST DIDN'T LAST AND YOU DIDN'T HAVE MONEY TO GET MORE.: NEVER TRUE

## 2023-10-10 ASSESSMENT — PATIENT HEALTH QUESTIONNAIRE - PHQ9
SUM OF ALL RESPONSES TO PHQ QUESTIONS 1-9: 0
1. LITTLE INTEREST OR PLEASURE IN DOING THINGS: 0
2. FEELING DOWN, DEPRESSED OR HOPELESS: 0
SUM OF ALL RESPONSES TO PHQ QUESTIONS 1-9: 0
SUM OF ALL RESPONSES TO PHQ9 QUESTIONS 1 & 2: 0
SUM OF ALL RESPONSES TO PHQ QUESTIONS 1-9: 0
SUM OF ALL RESPONSES TO PHQ QUESTIONS 1-9: 0

## 2023-12-07 ENCOUNTER — TELEPHONE (OUTPATIENT)
Dept: PRIMARY CARE CLINIC | Age: 63
End: 2023-12-07

## 2024-01-20 ENCOUNTER — HOSPITAL ENCOUNTER (OUTPATIENT)
Age: 64
Discharge: HOME | End: 2024-01-20
Payer: MEDICARE

## 2024-01-20 DIAGNOSIS — E78.00 PURE HYPERCHOLESTEROLEMIA: ICD-10-CM

## 2024-01-20 DIAGNOSIS — I10 PRIMARY HYPERTENSION: ICD-10-CM

## 2024-01-20 DIAGNOSIS — R73.01 IMPAIRED FASTING GLUCOSE: ICD-10-CM

## 2024-01-20 DIAGNOSIS — I73.9 PVD (PERIPHERAL VASCULAR DISEASE) (HCC): ICD-10-CM

## 2024-01-20 LAB
ALBUMIN SERPL-MCNC: 4.3 G/DL (ref 3.5–5.2)
ALP SERPL-CCNC: 100 U/L (ref 40–129)
ALT SERPL-CCNC: 16 U/L (ref 0–40)
ANION GAP SERPL CALCULATED.3IONS-SCNC: 12 MMOL/L (ref 7–16)
AST SERPL-CCNC: 14 U/L (ref 0–39)
BASOPHILS # BLD: 0.01 K/UL (ref 0–0.2)
BASOPHILS NFR BLD: 0 % (ref 0–2)
BILIRUB SERPL-MCNC: 0.5 MG/DL (ref 0–1.2)
BUN SERPL-MCNC: 21 MG/DL (ref 6–23)
CALCIUM SERPL-MCNC: 9.4 MG/DL (ref 8.6–10.2)
CHLORIDE SERPL-SCNC: 101 MMOL/L (ref 98–107)
CHOLEST SERPL-MCNC: 176 MG/DL
CO2 SERPL-SCNC: 27 MMOL/L (ref 22–29)
CREAT SERPL-MCNC: 0.9 MG/DL (ref 0.7–1.2)
EOSINOPHIL # BLD: 0.12 K/UL (ref 0.05–0.5)
EOSINOPHILS RELATIVE PERCENT: 3 % (ref 0–6)
ERYTHROCYTE [DISTWIDTH] IN BLOOD BY AUTOMATED COUNT: 13.4 % (ref 11.5–15)
GFR SERPL CREATININE-BSD FRML MDRD: >60 ML/MIN/1.73M2
GLUCOSE SERPL-MCNC: 145 MG/DL (ref 74–99)
HBA1C MFR BLD: 6.6 % (ref 4–5.6)
HCT VFR BLD AUTO: 45.8 % (ref 37–54)
HDLC SERPL-MCNC: 36 MG/DL
HGB BLD-MCNC: 15.6 G/DL (ref 12.5–16.5)
IMM GRANULOCYTES # BLD AUTO: <0.03 K/UL (ref 0–0.58)
IMM GRANULOCYTES NFR BLD: 0 % (ref 0–5)
LDLC SERPL CALC-MCNC: 124 MG/DL
LYMPHOCYTES NFR BLD: 1.17 K/UL (ref 1.5–4)
LYMPHOCYTES RELATIVE PERCENT: 24 % (ref 20–42)
MCH RBC QN AUTO: 28.2 PG (ref 26–35)
MCHC RBC AUTO-ENTMCNC: 34.1 G/DL (ref 32–34.5)
MCV RBC AUTO: 82.8 FL (ref 80–99.9)
MONOCYTES NFR BLD: 0.36 K/UL (ref 0.1–0.95)
MONOCYTES NFR BLD: 8 % (ref 2–12)
NEUTROPHILS NFR BLD: 65 % (ref 43–80)
NEUTS SEG NFR BLD: 3.14 K/UL (ref 1.8–7.3)
PLATELET # BLD AUTO: 162 K/UL (ref 130–450)
PMV BLD AUTO: 9.6 FL (ref 7–12)
POTASSIUM SERPL-SCNC: 3.3 MMOL/L (ref 3.5–5)
PROT SERPL-MCNC: 7.3 G/DL (ref 6.4–8.3)
RBC # BLD AUTO: 5.53 M/UL (ref 3.8–5.8)
SODIUM SERPL-SCNC: 140 MMOL/L (ref 132–146)
T4 SERPL-MCNC: 9.4 UG/DL (ref 4.5–11.7)
TRIGL SERPL-MCNC: 81 MG/DL
TSH SERPL DL<=0.05 MIU/L-ACNC: 1.23 UIU/ML (ref 0.27–4.2)
VLDLC SERPL CALC-MCNC: 16 MG/DL
WBC OTHER # BLD: 4.8 K/UL (ref 4.5–11.5)

## 2024-01-20 PROCEDURE — 80053 COMPREHEN METABOLIC PANEL: CPT

## 2024-01-20 PROCEDURE — 80061 LIPID PANEL: CPT

## 2024-01-20 PROCEDURE — 85025 COMPLETE CBC W/AUTO DIFF WBC: CPT

## 2024-01-20 PROCEDURE — 84436 ASSAY OF TOTAL THYROXINE: CPT

## 2024-01-20 PROCEDURE — 84443 ASSAY THYROID STIM HORMONE: CPT

## 2024-01-20 PROCEDURE — 36415 COLL VENOUS BLD VENIPUNCTURE: CPT

## 2024-01-20 PROCEDURE — 83036 HEMOGLOBIN GLYCOSYLATED A1C: CPT

## 2024-02-06 ENCOUNTER — OFFICE VISIT (OUTPATIENT)
Dept: PRIMARY CARE CLINIC | Age: 64
End: 2024-02-06
Payer: MEDICARE

## 2024-02-06 VITALS
HEART RATE: 84 BPM | SYSTOLIC BLOOD PRESSURE: 144 MMHG | OXYGEN SATURATION: 97 % | TEMPERATURE: 98.4 F | BODY MASS INDEX: 32.14 KG/M2 | WEIGHT: 200 LBS | HEIGHT: 66 IN | DIASTOLIC BLOOD PRESSURE: 80 MMHG

## 2024-02-06 DIAGNOSIS — I10 PRIMARY HYPERTENSION: ICD-10-CM

## 2024-02-06 DIAGNOSIS — E87.6 HYPOKALEMIA: ICD-10-CM

## 2024-02-06 DIAGNOSIS — E78.00 PURE HYPERCHOLESTEROLEMIA: ICD-10-CM

## 2024-02-06 DIAGNOSIS — I73.9 PVD (PERIPHERAL VASCULAR DISEASE) (HCC): Primary | ICD-10-CM

## 2024-02-06 DIAGNOSIS — R73.01 IMPAIRED FASTING GLUCOSE: ICD-10-CM

## 2024-02-06 PROCEDURE — 3017F COLORECTAL CA SCREEN DOC REV: CPT | Performed by: FAMILY MEDICINE

## 2024-02-06 PROCEDURE — G8484 FLU IMMUNIZE NO ADMIN: HCPCS | Performed by: FAMILY MEDICINE

## 2024-02-06 PROCEDURE — 3079F DIAST BP 80-89 MM HG: CPT | Performed by: FAMILY MEDICINE

## 2024-02-06 PROCEDURE — 1123F ACP DISCUSS/DSCN MKR DOCD: CPT | Performed by: FAMILY MEDICINE

## 2024-02-06 PROCEDURE — 99214 OFFICE O/P EST MOD 30 MIN: CPT | Performed by: FAMILY MEDICINE

## 2024-02-06 PROCEDURE — G8427 DOCREV CUR MEDS BY ELIG CLIN: HCPCS | Performed by: FAMILY MEDICINE

## 2024-02-06 PROCEDURE — G8417 CALC BMI ABV UP PARAM F/U: HCPCS | Performed by: FAMILY MEDICINE

## 2024-02-06 PROCEDURE — 3077F SYST BP >= 140 MM HG: CPT | Performed by: FAMILY MEDICINE

## 2024-02-06 PROCEDURE — 4004F PT TOBACCO SCREEN RCVD TLK: CPT | Performed by: FAMILY MEDICINE

## 2024-02-06 RX ORDER — LISINOPRIL 5 MG/1
5 TABLET ORAL DAILY
Qty: 30 TABLET | Refills: 5 | Status: SHIPPED | OUTPATIENT
Start: 2024-02-06

## 2024-02-06 ASSESSMENT — ENCOUNTER SYMPTOMS
GASTROINTESTINAL NEGATIVE: 1
RESPIRATORY NEGATIVE: 1
ALLERGIC/IMMUNOLOGIC NEGATIVE: 1
EYES NEGATIVE: 1

## 2024-02-06 NOTE — PROGRESS NOTES
24     Mg Jaffe    : 1960 Sex: male   Age: 63 y.o.      Chief Complaint   Patient presents with    Discuss Labs    Discuss Medications     Pt quit taking metformin states that made his feet go numb.       Prior to Admission medications    Medication Sig Start Date End Date Taking? Authorizing Provider   lisinopril (PRINIVIL;ZESTRIL) 5 MG tablet Take 1 tablet by mouth daily 24  Yes Ravinder Rader DO   COSENTYX SENSOREADY, 300 MG, 150 MG/ML SOAJ  23  Yes Provider, MD Nagi   FLOMAX 0.4 MG capsule 1 hs 23  Yes Ravinder Rader DO   ADBRY 150 MG/ML SOSY  23  Yes Provider, MD Nagi   betamethasone dipropionate 0.05 % cream apply to affected area twice a day 23  Yes Provider, MD Nagi   tacrolimus (PROTOPIC) 0.1 % ointment apply to affected area twice a day 23  Yes Provider, Historical, MD   pravastatin (PRAVACHOL) 40 MG tablet take 1 tablet by mouth once daily 23  Yes Ravinder Rader DO   amLODIPine (NORVASC) 5 MG tablet take 1 tablet by mouth once daily 23  Yes Ravinder Rader DO   diclofenac sodium (VOLTAREN) 1 % GEL Apply 4 g topically 4 times daily 20  Yes Flynn Weinberg DPM   triamcinolone (KENALOG) 0.1 % cream Apply topically 2 times daily. 20  Yes Marcelle Wong PA   desoximetasone (TOPICORT) 0.25 % cream Apply topically 2 times daily. 20  Yes Ravinder Rader DO   sildenafil (REVATIO) 20 MG tablet 2-3 tabs as needed   Yes Provider, MD Nagi   metFORMIN (GLUCOPHAGE-XR) 500 MG extended release tablet take 1 tablet by mouth once daily with food 23   Ravinder Rader DO          HPI: Patient is seen today peripheral vascular disease hypertensive disease impaired fasting glucose hyperlipidemia hypokalemia.  Reviewed medications today and we discontinued hydrochlorothiazide.  Addition of lisinopril 5 mg a day to the present regimen and then reassess in 4 weeks.  Repeat labs again at that time.

## 2024-02-07 RX ORDER — PRAVASTATIN SODIUM 40 MG
TABLET ORAL
Qty: 90 TABLET | Refills: 3 | Status: SHIPPED | OUTPATIENT
Start: 2024-02-07

## 2024-02-12 RX ORDER — AMLODIPINE BESYLATE 5 MG/1
TABLET ORAL
Qty: 90 TABLET | Refills: 3 | Status: SHIPPED | OUTPATIENT
Start: 2024-02-12

## 2024-03-02 ENCOUNTER — HOSPITAL ENCOUNTER (OUTPATIENT)
Age: 64
Discharge: HOME | End: 2024-03-02
Payer: MEDICARE

## 2024-03-02 DIAGNOSIS — I10 PRIMARY HYPERTENSION: ICD-10-CM

## 2024-03-02 DIAGNOSIS — R73.01 IMPAIRED FASTING GLUCOSE: ICD-10-CM

## 2024-03-02 DIAGNOSIS — E78.00 PURE HYPERCHOLESTEROLEMIA: ICD-10-CM

## 2024-03-02 LAB
ALBUMIN SERPL-MCNC: 4.3 G/DL (ref 3.5–5.2)
ALP SERPL-CCNC: 93 U/L (ref 40–129)
ALT SERPL-CCNC: 14 U/L (ref 0–40)
ANION GAP SERPL CALCULATED.3IONS-SCNC: 9 MMOL/L (ref 7–16)
AST SERPL-CCNC: 12 U/L (ref 0–39)
BILIRUB SERPL-MCNC: 0.5 MG/DL (ref 0–1.2)
BUN SERPL-MCNC: 14 MG/DL (ref 6–23)
CALCIUM SERPL-MCNC: 9.7 MG/DL (ref 8.6–10.2)
CHLORIDE SERPL-SCNC: 104 MMOL/L (ref 98–107)
CHOLEST SERPL-MCNC: 179 MG/DL
CO2 SERPL-SCNC: 28 MMOL/L (ref 22–29)
CREAT SERPL-MCNC: 0.8 MG/DL (ref 0.7–1.2)
GFR SERPL CREATININE-BSD FRML MDRD: >60 ML/MIN/1.73M2
GLUCOSE SERPL-MCNC: 137 MG/DL (ref 74–99)
HBA1C MFR BLD: 6.5 % (ref 4–5.6)
HDLC SERPL-MCNC: 34 MG/DL
LDLC SERPL CALC-MCNC: 127 MG/DL
POTASSIUM SERPL-SCNC: 4.2 MMOL/L (ref 3.5–5)
PROT SERPL-MCNC: 7.1 G/DL (ref 6.4–8.3)
SODIUM SERPL-SCNC: 141 MMOL/L (ref 132–146)
TRIGL SERPL-MCNC: 88 MG/DL
VLDLC SERPL CALC-MCNC: 18 MG/DL

## 2024-03-02 PROCEDURE — 83036 HEMOGLOBIN GLYCOSYLATED A1C: CPT

## 2024-03-02 PROCEDURE — 36415 COLL VENOUS BLD VENIPUNCTURE: CPT

## 2024-03-02 PROCEDURE — 80061 LIPID PANEL: CPT

## 2024-03-02 PROCEDURE — 80053 COMPREHEN METABOLIC PANEL: CPT

## 2024-03-07 ENCOUNTER — OFFICE VISIT (OUTPATIENT)
Dept: PRIMARY CARE CLINIC | Age: 64
End: 2024-03-07
Payer: MEDICARE

## 2024-03-07 VITALS
OXYGEN SATURATION: 97 % | HEART RATE: 95 BPM | TEMPERATURE: 98.2 F | BODY MASS INDEX: 32.3 KG/M2 | HEIGHT: 66 IN | DIASTOLIC BLOOD PRESSURE: 78 MMHG | WEIGHT: 201 LBS | SYSTOLIC BLOOD PRESSURE: 136 MMHG

## 2024-03-07 DIAGNOSIS — I10 PRIMARY HYPERTENSION: Primary | ICD-10-CM

## 2024-03-07 DIAGNOSIS — R73.01 IMPAIRED FASTING GLUCOSE: ICD-10-CM

## 2024-03-07 DIAGNOSIS — E78.00 PURE HYPERCHOLESTEROLEMIA: ICD-10-CM

## 2024-03-07 PROCEDURE — 3075F SYST BP GE 130 - 139MM HG: CPT | Performed by: FAMILY MEDICINE

## 2024-03-07 PROCEDURE — 1123F ACP DISCUSS/DSCN MKR DOCD: CPT | Performed by: FAMILY MEDICINE

## 2024-03-07 PROCEDURE — G8427 DOCREV CUR MEDS BY ELIG CLIN: HCPCS | Performed by: FAMILY MEDICINE

## 2024-03-07 PROCEDURE — 99214 OFFICE O/P EST MOD 30 MIN: CPT | Performed by: FAMILY MEDICINE

## 2024-03-07 PROCEDURE — 3017F COLORECTAL CA SCREEN DOC REV: CPT | Performed by: FAMILY MEDICINE

## 2024-03-07 PROCEDURE — G8484 FLU IMMUNIZE NO ADMIN: HCPCS | Performed by: FAMILY MEDICINE

## 2024-03-07 PROCEDURE — G8417 CALC BMI ABV UP PARAM F/U: HCPCS | Performed by: FAMILY MEDICINE

## 2024-03-07 PROCEDURE — 4004F PT TOBACCO SCREEN RCVD TLK: CPT | Performed by: FAMILY MEDICINE

## 2024-03-07 PROCEDURE — 3078F DIAST BP <80 MM HG: CPT | Performed by: FAMILY MEDICINE

## 2024-03-07 ASSESSMENT — ENCOUNTER SYMPTOMS
RESPIRATORY NEGATIVE: 1
ALLERGIC/IMMUNOLOGIC NEGATIVE: 1
GASTROINTESTINAL NEGATIVE: 1
EYES NEGATIVE: 1

## 2024-03-07 ASSESSMENT — PATIENT HEALTH QUESTIONNAIRE - PHQ9
2. FEELING DOWN, DEPRESSED OR HOPELESS: 0
SUM OF ALL RESPONSES TO PHQ QUESTIONS 1-9: 0
SUM OF ALL RESPONSES TO PHQ QUESTIONS 1-9: 0
SUM OF ALL RESPONSES TO PHQ9 QUESTIONS 1 & 2: 0
1. LITTLE INTEREST OR PLEASURE IN DOING THINGS: 0
SUM OF ALL RESPONSES TO PHQ QUESTIONS 1-9: 0
SUM OF ALL RESPONSES TO PHQ QUESTIONS 1-9: 0

## 2024-03-07 NOTE — PROGRESS NOTES
3/7/24     Mg Jaffe    : 1960 Sex: male   Age: 63 y.o.      Chief Complaint   Patient presents with    Discuss Labs       Prior to Admission medications    Medication Sig Start Date End Date Taking? Authorizing Provider   amLODIPine (NORVASC) 5 MG tablet take 1 tablet by mouth once daily 24  Yes Ravinder Rader DO   pravastatin (PRAVACHOL) 40 MG tablet take 1 tablet by mouth once daily 24  Yes Ravinder Rader DO   lisinopril (PRINIVIL;ZESTRIL) 5 MG tablet Take 1 tablet by mouth daily 24  Yes Ravinder Rader DO   COSENTYX SENSOREADY, 300 MG, 150 MG/ML SOAJ  23  Yes Provider, MD Nagi   FLOMAX 0.4 MG capsule 1 hs 23  Yes Ravinder Rader DO   ADBRY 150 MG/ML SOSY  23  Yes Provider, MD Nagi   betamethasone dipropionate 0.05 % cream apply to affected area twice a day 23  Yes Provider, MD Nagi   tacrolimus (PROTOPIC) 0.1 % ointment apply to affected area twice a day 23  Yes Provider, MD Nagi   metFORMIN (GLUCOPHAGE-XR) 500 MG extended release tablet take 1 tablet by mouth once daily with food 23  Yes Ravinder Rader DO   diclofenac sodium (VOLTAREN) 1 % GEL Apply 4 g topically 4 times daily 20  Yes Flynn Weinberg DPM   triamcinolone (KENALOG) 0.1 % cream Apply topically 2 times daily. 20  Yes Marcelle Wong PA   desoximetasone (TOPICORT) 0.25 % cream Apply topically 2 times daily. 20  Yes Ravinder Rader DO   sildenafil (REVATIO) 20 MG tablet 2-3 tabs as needed   Yes Provider, MD Nagi          HPI: Patient seen today medical follow-up hypertension hyperlipidemia blood sugar elevations and currently doing very well.  A1c is excellent 6.5 potassium did improve.  4.2 sodium remains stable.  Much better off of hydrochlorothiazide.  Continue with amlodipine and lisinopril as prescribed.  Metformin well-tolerated.          Review of Systems   Constitutional: Negative.    HENT: Negative.     Eyes:

## 2024-04-24 RX ORDER — METFORMIN HYDROCHLORIDE 500 MG/1
TABLET, EXTENDED RELEASE ORAL
Qty: 90 TABLET | Refills: 0 | Status: SHIPPED | OUTPATIENT
Start: 2024-04-24

## 2024-06-12 ENCOUNTER — HOSPITAL ENCOUNTER (OUTPATIENT)
Age: 64
Discharge: HOME | End: 2024-06-12
Payer: MEDICARE

## 2024-06-12 DIAGNOSIS — R73.01 IMPAIRED FASTING GLUCOSE: ICD-10-CM

## 2024-06-12 DIAGNOSIS — E78.00 PURE HYPERCHOLESTEROLEMIA: ICD-10-CM

## 2024-06-12 DIAGNOSIS — I10 PRIMARY HYPERTENSION: ICD-10-CM

## 2024-06-12 LAB
ALBUMIN SERPL-MCNC: 4.3 G/DL (ref 3.5–5.2)
ALP SERPL-CCNC: 106 U/L (ref 40–129)
ALT SERPL-CCNC: 15 U/L (ref 0–40)
ANION GAP SERPL CALCULATED.3IONS-SCNC: 10 MMOL/L (ref 7–16)
AST SERPL-CCNC: 13 U/L (ref 0–39)
BASOPHILS # BLD: 0.02 K/UL (ref 0–0.2)
BASOPHILS NFR BLD: 0 % (ref 0–2)
BILIRUB SERPL-MCNC: 0.4 MG/DL (ref 0–1.2)
BUN SERPL-MCNC: 14 MG/DL (ref 6–23)
CALCIUM SERPL-MCNC: 9.6 MG/DL (ref 8.6–10.2)
CHLORIDE SERPL-SCNC: 101 MMOL/L (ref 98–107)
CHOLEST SERPL-MCNC: 191 MG/DL
CO2 SERPL-SCNC: 27 MMOL/L (ref 22–29)
CREAT SERPL-MCNC: 0.8 MG/DL (ref 0.7–1.2)
EOSINOPHIL # BLD: 0.17 K/UL (ref 0.05–0.5)
EOSINOPHILS RELATIVE PERCENT: 3 % (ref 0–6)
ERYTHROCYTE [DISTWIDTH] IN BLOOD BY AUTOMATED COUNT: 13.5 % (ref 11.5–15)
GFR, ESTIMATED: >90 ML/MIN/1.73M2
GLUCOSE SERPL-MCNC: 159 MG/DL (ref 74–99)
HBA1C MFR BLD: 6.7 % (ref 4–5.6)
HCT VFR BLD AUTO: 47.2 % (ref 37–54)
HDLC SERPL-MCNC: 40 MG/DL
HGB BLD-MCNC: 15.8 G/DL (ref 12.5–16.5)
IMM GRANULOCYTES # BLD AUTO: <0.03 K/UL (ref 0–0.58)
IMM GRANULOCYTES NFR BLD: 0 % (ref 0–5)
LDLC SERPL CALC-MCNC: 129 MG/DL
LYMPHOCYTES NFR BLD: 1.46 K/UL (ref 1.5–4)
LYMPHOCYTES RELATIVE PERCENT: 26 % (ref 20–42)
MCH RBC QN AUTO: 28.1 PG (ref 26–35)
MCHC RBC AUTO-ENTMCNC: 33.5 G/DL (ref 32–34.5)
MCV RBC AUTO: 83.8 FL (ref 80–99.9)
MONOCYTES NFR BLD: 0.46 K/UL (ref 0.1–0.95)
MONOCYTES NFR BLD: 8 % (ref 2–12)
NEUTROPHILS NFR BLD: 63 % (ref 43–80)
NEUTS SEG NFR BLD: 3.56 K/UL (ref 1.8–7.3)
PLATELET # BLD AUTO: 166 K/UL (ref 130–450)
PMV BLD AUTO: 9.1 FL (ref 7–12)
POTASSIUM SERPL-SCNC: 4 MMOL/L (ref 3.5–5)
PROT SERPL-MCNC: 7 G/DL (ref 6.4–8.3)
RBC # BLD AUTO: 5.63 M/UL (ref 3.8–5.8)
SODIUM SERPL-SCNC: 138 MMOL/L (ref 132–146)
T4 SERPL-MCNC: 8.1 UG/DL (ref 4.5–11.7)
TRIGL SERPL-MCNC: 111 MG/DL
TSH SERPL DL<=0.05 MIU/L-ACNC: 1.75 UIU/ML (ref 0.27–4.2)
VLDLC SERPL CALC-MCNC: 22 MG/DL
WBC OTHER # BLD: 5.7 K/UL (ref 4.5–11.5)

## 2024-06-12 PROCEDURE — 83036 HEMOGLOBIN GLYCOSYLATED A1C: CPT

## 2024-06-12 PROCEDURE — 80053 COMPREHEN METABOLIC PANEL: CPT

## 2024-06-12 PROCEDURE — 80061 LIPID PANEL: CPT

## 2024-06-12 PROCEDURE — 84443 ASSAY THYROID STIM HORMONE: CPT

## 2024-06-12 PROCEDURE — 84436 ASSAY OF TOTAL THYROXINE: CPT

## 2024-06-12 PROCEDURE — 36415 COLL VENOUS BLD VENIPUNCTURE: CPT

## 2024-06-12 PROCEDURE — 85025 COMPLETE CBC W/AUTO DIFF WBC: CPT

## 2024-06-13 ENCOUNTER — OFFICE VISIT (OUTPATIENT)
Dept: PRIMARY CARE CLINIC | Age: 64
End: 2024-06-13

## 2024-06-13 ENCOUNTER — OFFICE VISIT (OUTPATIENT)
Dept: PRIMARY CARE CLINIC | Age: 64
End: 2024-06-13
Payer: MEDICARE

## 2024-06-13 VITALS
DIASTOLIC BLOOD PRESSURE: 70 MMHG | SYSTOLIC BLOOD PRESSURE: 138 MMHG | OXYGEN SATURATION: 94 % | TEMPERATURE: 98 F | HEART RATE: 92 BPM | BODY MASS INDEX: 31.82 KG/M2 | WEIGHT: 198 LBS | HEIGHT: 66 IN

## 2024-06-13 VITALS
DIASTOLIC BLOOD PRESSURE: 86 MMHG | SYSTOLIC BLOOD PRESSURE: 158 MMHG | HEIGHT: 66 IN | BODY MASS INDEX: 31.82 KG/M2 | OXYGEN SATURATION: 94 % | TEMPERATURE: 98 F | HEART RATE: 92 BPM | WEIGHT: 198 LBS

## 2024-06-13 DIAGNOSIS — R73.01 IMPAIRED FASTING GLUCOSE: ICD-10-CM

## 2024-06-13 DIAGNOSIS — E78.00 PURE HYPERCHOLESTEROLEMIA: ICD-10-CM

## 2024-06-13 DIAGNOSIS — Z00.00 WELCOME TO MEDICARE PREVENTIVE VISIT: Primary | ICD-10-CM

## 2024-06-13 DIAGNOSIS — F17.200 SMOKER: ICD-10-CM

## 2024-06-13 DIAGNOSIS — Z87.891 PERSONAL HISTORY OF TOBACCO USE: ICD-10-CM

## 2024-06-13 DIAGNOSIS — I10 PRIMARY HYPERTENSION: Primary | ICD-10-CM

## 2024-06-13 PROCEDURE — 3017F COLORECTAL CA SCREEN DOC REV: CPT | Performed by: FAMILY MEDICINE

## 2024-06-13 PROCEDURE — 3078F DIAST BP <80 MM HG: CPT | Performed by: FAMILY MEDICINE

## 2024-06-13 PROCEDURE — 99214 OFFICE O/P EST MOD 30 MIN: CPT | Performed by: FAMILY MEDICINE

## 2024-06-13 PROCEDURE — 4004F PT TOBACCO SCREEN RCVD TLK: CPT | Performed by: FAMILY MEDICINE

## 2024-06-13 PROCEDURE — G8427 DOCREV CUR MEDS BY ELIG CLIN: HCPCS | Performed by: FAMILY MEDICINE

## 2024-06-13 PROCEDURE — G8417 CALC BMI ABV UP PARAM F/U: HCPCS | Performed by: FAMILY MEDICINE

## 2024-06-13 PROCEDURE — 3075F SYST BP GE 130 - 139MM HG: CPT | Performed by: FAMILY MEDICINE

## 2024-06-13 PROCEDURE — 1123F ACP DISCUSS/DSCN MKR DOCD: CPT | Performed by: FAMILY MEDICINE

## 2024-06-13 ASSESSMENT — LIFESTYLE VARIABLES
HOW MANY STANDARD DRINKS CONTAINING ALCOHOL DO YOU HAVE ON A TYPICAL DAY: 1 OR 2
HOW OFTEN DO YOU HAVE A DRINK CONTAINING ALCOHOL: MONTHLY OR LESS

## 2024-06-13 ASSESSMENT — PATIENT HEALTH QUESTIONNAIRE - PHQ9
SUM OF ALL RESPONSES TO PHQ9 QUESTIONS 1 & 2: 0
SUM OF ALL RESPONSES TO PHQ QUESTIONS 1-9: 0
SUM OF ALL RESPONSES TO PHQ QUESTIONS 1-9: 0
1. LITTLE INTEREST OR PLEASURE IN DOING THINGS: NOT AT ALL
SUM OF ALL RESPONSES TO PHQ QUESTIONS 1-9: 0
2. FEELING DOWN, DEPRESSED OR HOPELESS: NOT AT ALL
SUM OF ALL RESPONSES TO PHQ QUESTIONS 1-9: 0

## 2024-06-13 NOTE — PATIENT INSTRUCTIONS
for you, and not smoking or using tobacco. It also includes taking medicines as directed, managing other health conditions, and trying to get a healthy amount of sleep.  Follow-up care is a key part of your treatment and safety. Be sure to make and go to all appointments, and call your doctor if you are having problems. It's also a good idea to know your test results and keep a list of the medicines you take.  How can you care for yourself at home?  Diet    Use less salt when you cook and eat. This helps lower your blood pressure. Taste food before salting. Add only a little salt when you think you need it. With time, your taste buds will adjust to less salt.     Eat fewer snack items, fast foods, canned soups, and other high-salt, high-fat, processed foods.     Read food labels and try to avoid saturated and trans fats. They increase your risk of heart disease by raising cholesterol levels.     Limit the amount of solid fat--butter, margarine, and shortening--you eat. Use olive, peanut, or canola oil when you cook. Bake, broil, and steam foods instead of frying them.     Eat a variety of fruit and vegetables every day. Dark green, deep orange, red, or yellow fruits and vegetables are especially good for you. Examples include spinach, carrots, peaches, and berries.     Foods high in fiber can reduce your cholesterol and provide important vitamins and minerals. High-fiber foods include whole-grain cereals and breads, oatmeal, beans, brown rice, citrus fruits, and apples.     Eat lean proteins. Heart-healthy proteins include seafood, lean meats and poultry, eggs, beans, peas, nuts, seeds, and soy products.     Limit drinks and foods with added sugar. These include candy, desserts, and soda pop.   Heart-healthy lifestyle    If your doctor recommends it, get more exercise. For many people, walking is a good choice. Or you may want to swim, bike, or do other activities. Bit by bit, increase the time you're active every

## 2024-06-13 NOTE — PROGRESS NOTES
Passive Exposure: Not on file     E-cigarette/Vaping       Questions Responses    E-cigarette/Vaping Use     Start Date     Passive Exposure     Quit Date     Counseling Given     Comments         Interventions:  Continue to encourage off cigarettes plan screening CT of the chest                      Objective   Vitals:    06/13/24 1651   BP: (!) 158/86   Pulse: 92   Temp: 98 °F (36.7 °C)   SpO2: 94%   Weight: 89.8 kg (198 lb)   Height: 1.676 m (5' 6\")      Body mass index is 31.96 kg/m².             Allergies   Allergen Reactions    Mobic [Meloxicam] Hives     Prior to Visit Medications    Medication Sig Taking? Authorizing Provider   metFORMIN (GLUCOPHAGE-XR) 500 MG extended release tablet take 1 tablet by mouth once daily with food  Ravinder Rader DO   amLODIPine (NORVASC) 5 MG tablet take 1 tablet by mouth once daily  Ravinder Rader DO   pravastatin (PRAVACHOL) 40 MG tablet take 1 tablet by mouth once daily  Ravinder Rader DO   lisinopril (PRINIVIL;ZESTRIL) 5 MG tablet Take 1 tablet by mouth daily  Ravinder Rader DO   COSENTYX SENSOREADY, 300 MG, 150 MG/ML SOAJ   ProviderNagi MD   FLOMAX 0.4 MG capsule 1 hs  Ravinder Rader DO   ADBRY 150 MG/ML SOSY   ProviderNagi MD   betamethasone dipropionate 0.05 % cream apply to affected area twice a day  ProviderNagi MD   tacrolimus (PROTOPIC) 0.1 % ointment apply to affected area twice a day  ProviderNagi MD   diclofenac sodium (VOLTAREN) 1 % GEL Apply 4 g topically 4 times daily  Flynn Weinberg DPM   triamcinolone (KENALOG) 0.1 % cream Apply topically 2 times daily.  Marcelle Wong PA   desoximetasone (TOPICORT) 0.25 % cream Apply topically 2 times daily.  Ravinder Rader DO   sildenafil (REVATIO) 20 MG tablet 2-3 tabs as needed  ProviderNagi MD CareTeam (Including outside providers/suppliers regularly involved in providing care):   Patient Care Team:  Ravinder Rader DO as PCP -

## 2024-06-13 NOTE — PROGRESS NOTES
knee    KNEE ARTHROPLASTY       Family History   Problem Relation Age of Onset    Heart Disease Mother     Cancer Mother         Brain    Cancer Father         colon, Pancreatic    Heart Attack Father     Diabetes Father      Past Medical History:   Diagnosis Date    Abscess     Hyperlipidemia     Hypertension     Impaired fasting glucose     Psoriasis        Vitals:    06/13/24 1644 06/13/24 1701   BP: (!) 158/86 138/70   Pulse: 92    Temp: 98 °F (36.7 °C)    SpO2: 94%    Weight: 89.8 kg (198 lb)    Height: 1.676 m (5' 6\")      BP Readings from Last 3 Encounters:   06/13/24 (!) 158/86   06/13/24 138/70   03/07/24 136/78      138/70  Physical Exam  Vitals and nursing note reviewed.   Constitutional:       Appearance: He is well-developed.   HENT:      Head: Normocephalic.      Right Ear: External ear normal.      Left Ear: External ear normal.      Nose: Nose normal.   Eyes:      Conjunctiva/sclera: Conjunctivae normal.      Pupils: Pupils are equal, round, and reactive to light.   Cardiovascular:      Rate and Rhythm: Normal rate.   Pulmonary:      Breath sounds: Normal breath sounds.   Abdominal:      General: Bowel sounds are normal.      Palpations: Abdomen is soft.   Musculoskeletal:         General: Normal range of motion.      Cervical back: Normal range of motion and neck supple.   Skin:     General: Skin is warm and dry.   Neurological:      Mental Status: He is alert and oriented to person, place, and time.   Psychiatric:         Behavior: Behavior normal.     Repeat blood pressure was excellent.  Heart regular lungs are clear.  Medications as prescribed.  Labs reviewed very stable.  A1c is 6.7.  Maintain present meds and care diet exercise follow-up visit with me 3 months and blood work at that time.      Lab Results   Component Value Date    TSH 1.75 06/12/2024    TSH 1.23 01/20/2024    G9GCMLY 8.1 06/12/2024    L6IYHBK 9.4 01/20/2024     Lab Results   Component Value Date    CHOL 191 06/12/2024    CHOL

## 2024-07-27 ENCOUNTER — APPOINTMENT (OUTPATIENT)
Dept: GENERAL RADIOLOGY | Age: 64
End: 2024-07-27
Payer: MEDICARE

## 2024-07-27 ENCOUNTER — OFFICE VISIT (OUTPATIENT)
Dept: FAMILY MEDICINE CLINIC | Age: 64
End: 2024-07-27
Payer: MEDICARE

## 2024-07-27 ENCOUNTER — HOSPITAL ENCOUNTER (EMERGENCY)
Age: 64
Discharge: HOME | End: 2024-07-27
Attending: STUDENT IN AN ORGANIZED HEALTH CARE EDUCATION/TRAINING PROGRAM
Payer: MEDICARE

## 2024-07-27 VITALS
BODY MASS INDEX: 30.53 KG/M2 | TEMPERATURE: 97.6 F | OXYGEN SATURATION: 97 % | WEIGHT: 190 LBS | HEIGHT: 66 IN | DIASTOLIC BLOOD PRESSURE: 80 MMHG | HEART RATE: 95 BPM | SYSTOLIC BLOOD PRESSURE: 144 MMHG

## 2024-07-27 VITALS
DIASTOLIC BLOOD PRESSURE: 80 MMHG | SYSTOLIC BLOOD PRESSURE: 159 MMHG | TEMPERATURE: 98.8 F | HEART RATE: 59 BPM | RESPIRATION RATE: 16 BRPM | OXYGEN SATURATION: 98 %

## 2024-07-27 DIAGNOSIS — S60.222A CONTUSION OF LEFT HAND, INITIAL ENCOUNTER: Primary | ICD-10-CM

## 2024-07-27 DIAGNOSIS — M79.645 PAIN OF LEFT THUMB: Primary | ICD-10-CM

## 2024-07-27 PROCEDURE — G8427 DOCREV CUR MEDS BY ELIG CLIN: HCPCS | Performed by: STUDENT IN AN ORGANIZED HEALTH CARE EDUCATION/TRAINING PROGRAM

## 2024-07-27 PROCEDURE — 3077F SYST BP >= 140 MM HG: CPT | Performed by: STUDENT IN AN ORGANIZED HEALTH CARE EDUCATION/TRAINING PROGRAM

## 2024-07-27 PROCEDURE — 99214 OFFICE O/P EST MOD 30 MIN: CPT | Performed by: STUDENT IN AN ORGANIZED HEALTH CARE EDUCATION/TRAINING PROGRAM

## 2024-07-27 PROCEDURE — 73130 X-RAY EXAM OF HAND: CPT

## 2024-07-27 PROCEDURE — 3079F DIAST BP 80-89 MM HG: CPT | Performed by: STUDENT IN AN ORGANIZED HEALTH CARE EDUCATION/TRAINING PROGRAM

## 2024-07-27 PROCEDURE — 6370000000 HC RX 637 (ALT 250 FOR IP): Performed by: STUDENT IN AN ORGANIZED HEALTH CARE EDUCATION/TRAINING PROGRAM

## 2024-07-27 PROCEDURE — 1123F ACP DISCUSS/DSCN MKR DOCD: CPT | Performed by: STUDENT IN AN ORGANIZED HEALTH CARE EDUCATION/TRAINING PROGRAM

## 2024-07-27 PROCEDURE — 99283 EMERGENCY DEPT VISIT LOW MDM: CPT

## 2024-07-27 PROCEDURE — 3017F COLORECTAL CA SCREEN DOC REV: CPT | Performed by: STUDENT IN AN ORGANIZED HEALTH CARE EDUCATION/TRAINING PROGRAM

## 2024-07-27 PROCEDURE — G8417 CALC BMI ABV UP PARAM F/U: HCPCS | Performed by: STUDENT IN AN ORGANIZED HEALTH CARE EDUCATION/TRAINING PROGRAM

## 2024-07-27 PROCEDURE — 4004F PT TOBACCO SCREEN RCVD TLK: CPT | Performed by: STUDENT IN AN ORGANIZED HEALTH CARE EDUCATION/TRAINING PROGRAM

## 2024-07-27 RX ORDER — HYDROCODONE BITARTRATE AND ACETAMINOPHEN 5; 325 MG/1; MG/1
1 TABLET ORAL ONCE
Status: COMPLETED | OUTPATIENT
Start: 2024-07-27 | End: 2024-07-27

## 2024-07-27 RX ADMIN — HYDROCODONE BITARTRATE AND ACETAMINOPHEN 1 TABLET: 5; 325 TABLET ORAL at 12:00

## 2024-07-27 ASSESSMENT — PAIN - FUNCTIONAL ASSESSMENT
PAIN_FUNCTIONAL_ASSESSMENT: ACTIVITIES ARE NOT PREVENTED
PAIN_FUNCTIONAL_ASSESSMENT: ACTIVITIES ARE NOT PREVENTED
PAIN_FUNCTIONAL_ASSESSMENT: 0-10

## 2024-07-27 ASSESSMENT — PAIN DESCRIPTION - ORIENTATION
ORIENTATION: LEFT

## 2024-07-27 ASSESSMENT — PAIN DESCRIPTION - DESCRIPTORS
DESCRIPTORS: THROBBING;ACHING
DESCRIPTORS: THROBBING
DESCRIPTORS: THROBBING

## 2024-07-27 ASSESSMENT — PAIN DESCRIPTION - LOCATION
LOCATION: HAND

## 2024-07-27 ASSESSMENT — PAIN SCALES - GENERAL
PAINLEVEL_OUTOF10: 8
PAINLEVEL_OUTOF10: 7
PAINLEVEL_OUTOF10: 4

## 2024-07-27 ASSESSMENT — LIFESTYLE VARIABLES
HOW MANY STANDARD DRINKS CONTAINING ALCOHOL DO YOU HAVE ON A TYPICAL DAY: 1 OR 2
HOW OFTEN DO YOU HAVE A DRINK CONTAINING ALCOHOL: 2-4 TIMES A MONTH

## 2024-07-27 ASSESSMENT — PAIN DESCRIPTION - PAIN TYPE
TYPE: ACUTE PAIN
TYPE: ACUTE PAIN

## 2024-07-27 ASSESSMENT — PAIN DESCRIPTION - FREQUENCY: FREQUENCY: INTERMITTENT

## 2024-07-27 NOTE — PROGRESS NOTES
for fracture, unclear if displaced or not, advised to go ED for further evaluation as he will need stat imaging and possibly pain treatment       Counseled regarding above diagnosis, including possible risks and complications,  especially if left uncontrolled. Counseled regarding the possible side effects, risks, benefits and alternatives to treatment;patient and/or guardian verbalizes understanding, agrees, feels comfortable with and wishes to proceed with above treatment plan.    Call or go to EDimmediately if symptoms worsen or persist. Advised patient to call with any new medication issues, and, as applicable, read all Rx info from pharmacy to assure aware of all possible risks and side effects of medicationbefore taking.     Patient and/or guardian given opportunity to ask questions/raise concerns.  The patient verbalized comfort and understanding ofinstructions.    I encourage further reading and education about your health conditions.  Information on many health conditions is provided by the[facility] [employer] of [employer]: [email or web address]  Please bring any questions to me at your nextvisit.    Return to Office: No follow-ups on file.    Medication List:    Current Outpatient Medications   Medication Sig Dispense Refill    metFORMIN (GLUCOPHAGE-XR) 500 MG extended release tablet take 1 tablet by mouth once daily with food 90 tablet 0    amLODIPine (NORVASC) 5 MG tablet take 1 tablet by mouth once daily 90 tablet 3    pravastatin (PRAVACHOL) 40 MG tablet take 1 tablet by mouth once daily 90 tablet 3    lisinopril (PRINIVIL;ZESTRIL) 5 MG tablet Take 1 tablet by mouth daily 30 tablet 5    COSENTYX SENSOREADY, 300 MG, 150 MG/ML SOAJ       FLOMAX 0.4 MG capsule 1 hs 30 capsule 3    ADBRY 150 MG/ML SOSY       betamethasone dipropionate 0.05 % cream apply to affected area twice a day      tacrolimus (PROTOPIC) 0.1 % ointment apply to affected area twice a day      diclofenac sodium (VOLTAREN) 1 % GEL

## 2024-07-27 NOTE — ED PROVIDER NOTES
[facility] EMERGENCY DEPARTMENT  EMERGENCY DEPARTMENT ENCOUNTER        Pt Name: Mg Jaffe  MRN: [id number]  Birthdate 1960  Date of evaluation: 7/27/2024  Provider: Kezia Jensen MD  PCP: Ravinder Rader DO  Note Started: 11:52 AM EDT 7/27/24    HPI  63 y.o. male presenting for left thumb pain.  Patient states he hit his left thumb with a hammer 2 days ago at work.  He broke the skin little at that time.  He complains of continued pain and swelling and states he is now having pain in the rest of his hand as well when he tries to move it.      --------------------------------------------- PAST HISTORY ---------------------------------------------  Past Medical History:  has a past medical history of Abscess, Hyperlipidemia, Hypertension, Impaired fasting glucose, and Psoriasis.    Past Surgical History:  has a past surgical history that includes joint replacement (Left); Colonoscopy (12/05/2016); and Knee Arthroplasty.    Social History:  reports that he has been smoking cigarettes. He has a 45.0 pack-year smoking history. He has never used smokeless tobacco. He reports current alcohol use. He reports that he does not use drugs.    Family History: family history includes Cancer in his father and mother; Diabetes in his father; Heart Attack in his father; Heart Disease in his mother.     The patient’s home medications have been reviewed.    Allergies: Mobic [meloxicam]      Review of Systems   Musculoskeletal:         Left thumb injury, pain   Skin:  Positive for wound.        Physical Exam  Constitutional:       General: He is not in acute distress.     Appearance: He is not ill-appearing.   Cardiovascular:      Comments: Left radial pulse and  Musculoskeletal:      Comments: Minimal swelling of the left hand, range of motion all digits slightly limited due to pain, superficial laceration to palmar surface of left thumb, clean, dry, no surrounding erythema or

## 2024-07-31 ENCOUNTER — OFFICE VISIT (OUTPATIENT)
Dept: FAMILY MEDICINE CLINIC | Age: 64
End: 2024-07-31
Payer: MEDICARE

## 2024-07-31 VITALS
TEMPERATURE: 98.3 F | SYSTOLIC BLOOD PRESSURE: 148 MMHG | OXYGEN SATURATION: 96 % | HEART RATE: 78 BPM | WEIGHT: 190 LBS | DIASTOLIC BLOOD PRESSURE: 74 MMHG | BODY MASS INDEX: 30.53 KG/M2 | HEIGHT: 66 IN

## 2024-07-31 DIAGNOSIS — S60.222A CONTUSION OF DORSUM OF LEFT HAND: Primary | ICD-10-CM

## 2024-07-31 DIAGNOSIS — R73.01 IMPAIRED FASTING GLUCOSE: ICD-10-CM

## 2024-07-31 DIAGNOSIS — I10 PRIMARY HYPERTENSION: ICD-10-CM

## 2024-07-31 PROCEDURE — 99214 OFFICE O/P EST MOD 30 MIN: CPT | Performed by: FAMILY MEDICINE

## 2024-07-31 PROCEDURE — 3017F COLORECTAL CA SCREEN DOC REV: CPT | Performed by: FAMILY MEDICINE

## 2024-07-31 PROCEDURE — 4004F PT TOBACCO SCREEN RCVD TLK: CPT | Performed by: FAMILY MEDICINE

## 2024-07-31 PROCEDURE — G8427 DOCREV CUR MEDS BY ELIG CLIN: HCPCS | Performed by: FAMILY MEDICINE

## 2024-07-31 PROCEDURE — G8417 CALC BMI ABV UP PARAM F/U: HCPCS | Performed by: FAMILY MEDICINE

## 2024-07-31 PROCEDURE — 3077F SYST BP >= 140 MM HG: CPT | Performed by: FAMILY MEDICINE

## 2024-07-31 PROCEDURE — 1123F ACP DISCUSS/DSCN MKR DOCD: CPT | Performed by: FAMILY MEDICINE

## 2024-07-31 PROCEDURE — 3078F DIAST BP <80 MM HG: CPT | Performed by: FAMILY MEDICINE

## 2024-07-31 NOTE — PROGRESS NOTES
Respiratory: Negative.     Gastrointestinal: Negative.    Endocrine: Negative.    Genitourinary: Negative.    Musculoskeletal: Negative.    Skin: Negative.    Allergic/Immunologic: Negative.    Neurological: Negative.    Hematological: Negative.    Psychiatric/Behavioral: Negative.     Left hand contusion as noted          Current Outpatient Medications:     metFORMIN (GLUCOPHAGE-XR) 500 MG extended release tablet, take 1 tablet by mouth once daily with food, Disp: 90 tablet, Rfl: 0    amLODIPine (NORVASC) 5 MG tablet, take 1 tablet by mouth once daily, Disp: 90 tablet, Rfl: 3    pravastatin (PRAVACHOL) 40 MG tablet, take 1 tablet by mouth once daily, Disp: 90 tablet, Rfl: 3    lisinopril (PRINIVIL;ZESTRIL) 5 MG tablet, Take 1 tablet by mouth daily, Disp: 30 tablet, Rfl: 5    COSENTYX SENSOREADY, 300 MG, 150 MG/ML SOAJ, , Disp: , Rfl:     FLOMAX 0.4 MG capsule, 1 hs, Disp: 30 capsule, Rfl: 3    ADBRY 150 MG/ML SOSY, , Disp: , Rfl:     betamethasone dipropionate 0.05 % cream, apply to affected area twice a day, Disp: , Rfl:     tacrolimus (PROTOPIC) 0.1 % ointment, apply to affected area twice a day, Disp: , Rfl:     diclofenac sodium (VOLTAREN) 1 % GEL, Apply 4 g topically 4 times daily, Disp: 4 Tube, Rfl: 1    triamcinolone (KENALOG) 0.1 % cream, Apply topically 2 times daily., Disp: 80 g, Rfl: 1    desoximetasone (TOPICORT) 0.25 % cream, Apply topically 2 times daily., Disp: 30 g, Rfl: 1    sildenafil (REVATIO) 20 MG tablet, 2-3 tabs as needed, Disp: , Rfl:     Allergies   Allergen Reactions    Mobic [Meloxicam] Hives       Social History     Tobacco Use    Smoking status: Heavy Smoker     Current packs/day: 1.50     Average packs/day: 1.5 packs/day for 30.0 years (45.0 ttl pk-yrs)     Types: Cigarettes    Smokeless tobacco: Never   Vaping Use    Vaping Use: Never used   Substance Use Topics    Alcohol use: Yes     Comment: beer     Drug use: No      Past Surgical History:   Procedure Laterality Date

## 2024-08-07 RX ORDER — METFORMIN HYDROCHLORIDE 500 MG/1
500 TABLET, EXTENDED RELEASE ORAL DAILY
Qty: 90 TABLET | Refills: 0 | Status: SHIPPED | OUTPATIENT
Start: 2024-08-07

## 2024-08-14 ENCOUNTER — APPOINTMENT (OUTPATIENT)
Dept: GENERAL RADIOLOGY | Age: 64
End: 2024-08-14
Payer: MEDICARE

## 2024-08-14 ENCOUNTER — HOSPITAL ENCOUNTER (EMERGENCY)
Age: 64
Discharge: HOME | End: 2024-08-14
Payer: MEDICARE

## 2024-08-14 VITALS
TEMPERATURE: 97.8 F | BODY MASS INDEX: 29.99 KG/M2 | HEIGHT: 65 IN | SYSTOLIC BLOOD PRESSURE: 152 MMHG | HEART RATE: 97 BPM | DIASTOLIC BLOOD PRESSURE: 80 MMHG | WEIGHT: 180 LBS | OXYGEN SATURATION: 93 % | RESPIRATION RATE: 14 BRPM

## 2024-08-14 DIAGNOSIS — S51.811A LACERATION OF RIGHT FOREARM, INITIAL ENCOUNTER: Primary | ICD-10-CM

## 2024-08-14 PROCEDURE — 6370000000 HC RX 637 (ALT 250 FOR IP): Performed by: NURSE PRACTITIONER

## 2024-08-14 PROCEDURE — 2500000003 HC RX 250 WO HCPCS: Performed by: NURSE PRACTITIONER

## 2024-08-14 PROCEDURE — 73090 X-RAY EXAM OF FOREARM: CPT

## 2024-08-14 PROCEDURE — 99283 EMERGENCY DEPT VISIT LOW MDM: CPT

## 2024-08-14 PROCEDURE — 12004 RPR S/N/AX/GEN/TRK7.6-12.5CM: CPT

## 2024-08-14 RX ORDER — BACITRACIN ZINC 500 [USP'U]/G
OINTMENT TOPICAL ONCE
Status: COMPLETED | OUTPATIENT
Start: 2024-08-14 | End: 2024-08-14

## 2024-08-14 RX ORDER — LIDOCAINE HYDROCHLORIDE 10 MG/ML
5 INJECTION, SOLUTION INFILTRATION; PERINEURAL ONCE
Status: COMPLETED | OUTPATIENT
Start: 2024-08-14 | End: 2024-08-14

## 2024-08-14 RX ADMIN — LIDOCAINE HYDROCHLORIDE 5 ML: 10 INJECTION, SOLUTION INFILTRATION; PERINEURAL at 14:55

## 2024-08-14 RX ADMIN — BACITRACIN ZINC: 500 OINTMENT TOPICAL at 14:55

## 2024-08-14 ASSESSMENT — PAIN - FUNCTIONAL ASSESSMENT
PAIN_FUNCTIONAL_ASSESSMENT: NONE - DENIES PAIN
PAIN_FUNCTIONAL_ASSESSMENT: NONE - DENIES PAIN

## 2024-08-14 ASSESSMENT — PAIN SCALES - GENERAL: PAINLEVEL_OUTOF10: 4

## 2024-08-14 ASSESSMENT — PAIN DESCRIPTION - LOCATION: LOCATION: ARM

## 2024-08-14 ASSESSMENT — PAIN DESCRIPTION - ORIENTATION: ORIENTATION: RIGHT

## 2024-08-14 NOTE — ED PROVIDER NOTES
[facility]  Department of Emergency Medicine   ED  Encounter Note  Admit Date/RoomTime: 2024  1:19 PM  ED Room: DOM/DOM    NAME: Mg Jaffe  : 1960  MRN: [id number]     Chief Complaint:  Laceration (Laceration to right arm that happened today )    History of Present Illness       Mg Jaffe is a 63 y.o. old male presenting to the emergency department by private vehicle, for a laceration to the Right forearm, caused by metal edge of care exhaust, which occurred at home approximately a few minute(s) prior to arrival.  There is not a possibility of retained foreign body in the affected area.  Bleeding is  controlled. He takes no blood thinning agents.  There is pain at injury site.   Tetanus Status:  up to date.     ROS   Pertinent positives and negatives are stated within HPI, all other systems reviewed and are negative.    Past Medical History:  has a past medical history of Abscess, Hyperlipidemia, Hypertension, Impaired fasting glucose, and Psoriasis.    Surgical History:  has a past surgical history that includes joint replacement (Left); Colonoscopy (2016); and Knee Arthroplasty.    Social History:  reports that he has been smoking cigarettes. He has a 45 pack-year smoking history. He has never used smokeless tobacco. He reports current alcohol use. He reports that he does not use drugs.    Family History: family history includes Cancer in his father and mother; Diabetes in his father; Heart Attack in his father; Heart Disease in his mother.     Allergies: Mobic [meloxicam]    Physical Exam  Physical Exam   Oxygen Saturation Interpretation: Normal.        ED Triage Vitals   BP Systolic BP Percentile Diastolic BP Percentile Temp Temp Source Pulse Respirations SpO2   24 1248 -- -- 24 1248 24 1248 24 1248 24 1248 24 1248   (!) 152/80   97.8 °F (36.6 °C) Oral 97 14 93 %      Height Weight - Scale         24 1313

## 2024-08-24 ENCOUNTER — OFFICE VISIT (OUTPATIENT)
Dept: FAMILY MEDICINE CLINIC | Age: 64
End: 2024-08-24

## 2024-08-24 VITALS
HEIGHT: 65 IN | BODY MASS INDEX: 31.16 KG/M2 | WEIGHT: 187 LBS | HEART RATE: 73 BPM | SYSTOLIC BLOOD PRESSURE: 142 MMHG | OXYGEN SATURATION: 96 % | DIASTOLIC BLOOD PRESSURE: 84 MMHG | TEMPERATURE: 97.4 F

## 2024-08-24 DIAGNOSIS — S41.111S LACERATION OF RIGHT UPPER EXTREMITY, SEQUELA: Primary | ICD-10-CM

## 2024-08-24 ASSESSMENT — ENCOUNTER SYMPTOMS
SHORTNESS OF BREATH: 0
DIARRHEA: 0
BACK PAIN: 0
ABDOMINAL PAIN: 0
EYE PAIN: 0
SINUS PAIN: 0
SORE THROAT: 0
COUGH: 0
NAUSEA: 0
CONSTIPATION: 0
VOMITING: 0
WHEEZING: 0

## 2024-08-24 NOTE — PROGRESS NOTES
Mg Jaffe (:  1960) is a 63 y.o. male,Established patient, here for evaluation of the following chief complaint(s):  Suture / Staple Removal (Right arm 12 stitches on 24)         Assessment & Plan  Laceration of right upper extremity, sequela    Sutures revmovd without difficulty, wound care reviewed with him at length, f/u if it worsens in the next few days, keep open to air at home, covered at work for the next week           Return if symptoms worsen or fail to improve.       Subjective   Here to have sutures removed  12 of them, cut arm on a muffler  They  have been in 11 days not  He has been keeping it covered at work and open at home  No bleeding or pain, denies complaints about it        Review of Systems   Constitutional:  Negative for appetite change, fatigue and fever.   HENT:  Negative for congestion, ear pain, hearing loss, sinus pain and sore throat.    Eyes:  Negative for pain.   Respiratory:  Negative for cough, shortness of breath and wheezing.    Cardiovascular:  Negative for chest pain and leg swelling.   Gastrointestinal:  Negative for abdominal pain, constipation, diarrhea, nausea and vomiting.   Endocrine: Negative for cold intolerance and heat intolerance.   Genitourinary:  Negative for difficulty urinating, hematuria, scrotal swelling, testicular pain and urgency.   Musculoskeletal:  Negative for arthralgias, back pain, joint swelling and myalgias.   Skin:  Negative for rash and wound.   Neurological:  Negative for dizziness, syncope and light-headedness.   Hematological:  Negative for adenopathy.   Psychiatric/Behavioral:  Negative for confusion and sleep disturbance. The patient is not nervous/anxious.           Objective   Physical Exam  Vitals and nursing note reviewed.   Constitutional:       General: He is not in acute distress.     Appearance: He is well-developed. He is not ill-appearing or toxic-appearing.   HENT:      Head: Normocephalic and atraumatic.

## 2024-08-29 RX ORDER — LISINOPRIL 5 MG/1
5 TABLET ORAL DAILY
Qty: 90 TABLET | Refills: 1 | Status: SHIPPED | OUTPATIENT
Start: 2024-08-29

## 2024-09-14 ENCOUNTER — HOSPITAL ENCOUNTER (OUTPATIENT)
Age: 64
Discharge: HOME | End: 2024-09-14
Payer: MEDICARE

## 2024-09-14 DIAGNOSIS — I10 PRIMARY HYPERTENSION: ICD-10-CM

## 2024-09-14 DIAGNOSIS — R73.01 IMPAIRED FASTING GLUCOSE: ICD-10-CM

## 2024-09-14 DIAGNOSIS — E78.00 PURE HYPERCHOLESTEROLEMIA: ICD-10-CM

## 2024-09-14 LAB
ALBUMIN SERPL-MCNC: 4.3 G/DL (ref 3.5–5.2)
ALP SERPL-CCNC: 105 U/L (ref 40–129)
ALT SERPL-CCNC: 12 U/L (ref 0–40)
ANION GAP SERPL CALCULATED.3IONS-SCNC: 11 MMOL/L (ref 7–16)
AST SERPL-CCNC: <5 U/L (ref 0–39)
BILIRUB SERPL-MCNC: 0.4 MG/DL (ref 0–1.2)
BUN SERPL-MCNC: 16 MG/DL (ref 6–23)
CALCIUM SERPL-MCNC: 9.8 MG/DL (ref 8.6–10.2)
CHLORIDE SERPL-SCNC: 103 MMOL/L (ref 98–107)
CHOLEST SERPL-MCNC: 176 MG/DL
CO2 SERPL-SCNC: 28 MMOL/L (ref 22–29)
CREAT SERPL-MCNC: 0.9 MG/DL (ref 0.7–1.2)
GFR, ESTIMATED: >90 ML/MIN/1.73M2
GLUCOSE SERPL-MCNC: 124 MG/DL (ref 74–99)
HBA1C MFR BLD: 6.5 % (ref 4–5.6)
HDLC SERPL-MCNC: 35 MG/DL
LDLC SERPL CALC-MCNC: 126 MG/DL
POTASSIUM SERPL-SCNC: 4 MMOL/L (ref 3.5–5)
PROT SERPL-MCNC: 7.3 G/DL (ref 6.4–8.3)
SODIUM SERPL-SCNC: 142 MMOL/L (ref 132–146)
TRIGL SERPL-MCNC: 73 MG/DL
VLDLC SERPL CALC-MCNC: 15 MG/DL

## 2024-09-14 PROCEDURE — 83036 HEMOGLOBIN GLYCOSYLATED A1C: CPT

## 2024-09-14 PROCEDURE — 36415 COLL VENOUS BLD VENIPUNCTURE: CPT

## 2024-09-14 PROCEDURE — 80053 COMPREHEN METABOLIC PANEL: CPT

## 2024-09-14 PROCEDURE — 80061 LIPID PANEL: CPT

## 2024-09-16 ENCOUNTER — OFFICE VISIT (OUTPATIENT)
Dept: PRIMARY CARE CLINIC | Age: 64
End: 2024-09-16
Payer: MEDICARE

## 2024-09-16 VITALS
TEMPERATURE: 98 F | DIASTOLIC BLOOD PRESSURE: 80 MMHG | HEART RATE: 97 BPM | HEIGHT: 65 IN | OXYGEN SATURATION: 96 % | BODY MASS INDEX: 31.49 KG/M2 | SYSTOLIC BLOOD PRESSURE: 140 MMHG | WEIGHT: 189 LBS

## 2024-09-16 DIAGNOSIS — F17.200 SMOKER: ICD-10-CM

## 2024-09-16 DIAGNOSIS — I10 PRIMARY HYPERTENSION: Primary | ICD-10-CM

## 2024-09-16 DIAGNOSIS — R73.01 IMPAIRED FASTING GLUCOSE: ICD-10-CM

## 2024-09-16 DIAGNOSIS — E78.00 PURE HYPERCHOLESTEROLEMIA: ICD-10-CM

## 2024-09-16 PROCEDURE — G8427 DOCREV CUR MEDS BY ELIG CLIN: HCPCS | Performed by: FAMILY MEDICINE

## 2024-09-16 PROCEDURE — 3079F DIAST BP 80-89 MM HG: CPT | Performed by: FAMILY MEDICINE

## 2024-09-16 PROCEDURE — G8417 CALC BMI ABV UP PARAM F/U: HCPCS | Performed by: FAMILY MEDICINE

## 2024-09-16 PROCEDURE — 3017F COLORECTAL CA SCREEN DOC REV: CPT | Performed by: FAMILY MEDICINE

## 2024-09-16 PROCEDURE — 99214 OFFICE O/P EST MOD 30 MIN: CPT | Performed by: FAMILY MEDICINE

## 2024-09-16 PROCEDURE — 1123F ACP DISCUSS/DSCN MKR DOCD: CPT | Performed by: FAMILY MEDICINE

## 2024-09-16 PROCEDURE — 4004F PT TOBACCO SCREEN RCVD TLK: CPT | Performed by: FAMILY MEDICINE

## 2024-09-16 PROCEDURE — 3077F SYST BP >= 140 MM HG: CPT | Performed by: FAMILY MEDICINE

## 2024-10-15 ENCOUNTER — OFFICE VISIT (OUTPATIENT)
Dept: FAMILY MEDICINE CLINIC | Age: 64
End: 2024-10-15
Payer: MEDICARE

## 2024-10-15 VITALS
DIASTOLIC BLOOD PRESSURE: 80 MMHG | BODY MASS INDEX: 31.49 KG/M2 | SYSTOLIC BLOOD PRESSURE: 138 MMHG | HEIGHT: 65 IN | HEART RATE: 85 BPM | OXYGEN SATURATION: 96 % | TEMPERATURE: 97.8 F | WEIGHT: 189 LBS

## 2024-10-15 DIAGNOSIS — R10.32 LEFT LOWER QUADRANT ABDOMINAL PAIN: Primary | ICD-10-CM

## 2024-10-15 DIAGNOSIS — R10.32 LEFT LOWER QUADRANT ABDOMINAL PAIN: ICD-10-CM

## 2024-10-15 DIAGNOSIS — R73.01 IMPAIRED FASTING GLUCOSE: ICD-10-CM

## 2024-10-15 DIAGNOSIS — I10 PRIMARY HYPERTENSION: ICD-10-CM

## 2024-10-15 LAB
ALBUMIN: 4.5 G/DL (ref 3.5–5.2)
ALP BLD-CCNC: 121 U/L (ref 40–129)
ALT SERPL-CCNC: 9 U/L (ref 0–40)
AMYLASE: 65 U/L (ref 20–100)
ANION GAP SERPL CALCULATED.3IONS-SCNC: 18 MMOL/L (ref 7–16)
AST SERPL-CCNC: 13 U/L (ref 0–39)
BASOPHILS ABSOLUTE: 0.01 K/UL (ref 0–0.2)
BASOPHILS RELATIVE PERCENT: 0 % (ref 0–2)
BILIRUB SERPL-MCNC: 0.5 MG/DL (ref 0–1.2)
BUN BLDV-MCNC: 17 MG/DL (ref 6–23)
C-REACTIVE PROTEIN: 7 MG/L (ref 0–5)
CALCIUM SERPL-MCNC: 9.7 MG/DL (ref 8.6–10.2)
CHLORIDE BLD-SCNC: 101 MMOL/L (ref 98–107)
CO2: 24 MMOL/L (ref 22–29)
CREAT SERPL-MCNC: 1.5 MG/DL (ref 0.7–1.2)
EOSINOPHILS ABSOLUTE: 0.06 K/UL (ref 0.05–0.5)
EOSINOPHILS RELATIVE PERCENT: 1 % (ref 0–6)
GFR, ESTIMATED: 51 ML/MIN/1.73M2
GLUCOSE BLD-MCNC: 134 MG/DL (ref 74–99)
HCT VFR BLD CALC: 46.9 % (ref 37–54)
HEMOGLOBIN: 15.2 G/DL (ref 12.5–16.5)
IMMATURE GRANULOCYTES %: 1 % (ref 0–5)
IMMATURE GRANULOCYTES ABSOLUTE: 0.04 K/UL (ref 0–0.58)
LIPASE: 60 U/L (ref 13–60)
LYMPHOCYTES ABSOLUTE: 0.87 K/UL (ref 1.5–4)
LYMPHOCYTES RELATIVE PERCENT: 12 % (ref 20–42)
MCH RBC QN AUTO: 27.7 PG (ref 26–35)
MCHC RBC AUTO-ENTMCNC: 32.4 G/DL (ref 32–34.5)
MCV RBC AUTO: 85.6 FL (ref 80–99.9)
MONOCYTES ABSOLUTE: 0.59 K/UL (ref 0.1–0.95)
MONOCYTES RELATIVE PERCENT: 8 % (ref 2–12)
NEUTROPHILS ABSOLUTE: 5.64 K/UL (ref 1.8–7.3)
NEUTROPHILS RELATIVE PERCENT: 78 % (ref 43–80)
PDW BLD-RTO: 13.6 % (ref 11.5–15)
PLATELET # BLD: 164 K/UL (ref 130–450)
PMV BLD AUTO: 9.4 FL (ref 7–12)
POTASSIUM SERPL-SCNC: 4.4 MMOL/L (ref 3.5–5)
RBC # BLD: 5.48 M/UL (ref 3.8–5.8)
SODIUM BLD-SCNC: 143 MMOL/L (ref 132–146)
TOTAL PROTEIN: 7.4 G/DL (ref 6.4–8.3)
WBC # BLD: 7.2 K/UL (ref 4.5–11.5)

## 2024-10-15 PROCEDURE — 1123F ACP DISCUSS/DSCN MKR DOCD: CPT | Performed by: FAMILY MEDICINE

## 2024-10-15 PROCEDURE — 3075F SYST BP GE 130 - 139MM HG: CPT | Performed by: FAMILY MEDICINE

## 2024-10-15 PROCEDURE — 4004F PT TOBACCO SCREEN RCVD TLK: CPT | Performed by: FAMILY MEDICINE

## 2024-10-15 PROCEDURE — G8417 CALC BMI ABV UP PARAM F/U: HCPCS | Performed by: FAMILY MEDICINE

## 2024-10-15 PROCEDURE — 3017F COLORECTAL CA SCREEN DOC REV: CPT | Performed by: FAMILY MEDICINE

## 2024-10-15 PROCEDURE — G8484 FLU IMMUNIZE NO ADMIN: HCPCS | Performed by: FAMILY MEDICINE

## 2024-10-15 PROCEDURE — 99214 OFFICE O/P EST MOD 30 MIN: CPT | Performed by: FAMILY MEDICINE

## 2024-10-15 PROCEDURE — 3079F DIAST BP 80-89 MM HG: CPT | Performed by: FAMILY MEDICINE

## 2024-10-15 PROCEDURE — G8427 DOCREV CUR MEDS BY ELIG CLIN: HCPCS | Performed by: FAMILY MEDICINE

## 2024-10-15 RX ORDER — CIPROFLOXACIN 500 MG/1
500 TABLET, FILM COATED ORAL 2 TIMES DAILY
Qty: 20 TABLET | Refills: 0 | Status: SHIPPED | OUTPATIENT
Start: 2024-10-15 | End: 2024-10-25

## 2024-10-15 ASSESSMENT — ENCOUNTER SYMPTOMS
ALLERGIC/IMMUNOLOGIC NEGATIVE: 1
RESPIRATORY NEGATIVE: 1
EYES NEGATIVE: 1
GASTROINTESTINAL NEGATIVE: 1

## 2024-10-15 NOTE — PROGRESS NOTES
tablet, 2-3 tabs as needed, Disp: , Rfl:     Allergies   Allergen Reactions    Mobic [Meloxicam] Hives       Social History     Tobacco Use    Smoking status: Heavy Smoker     Current packs/day: 1.50     Average packs/day: 1.5 packs/day for 30.0 years (45.0 ttl pk-yrs)     Types: Cigarettes    Smokeless tobacco: Never   Vaping Use    Vaping status: Never Used   Substance Use Topics    Alcohol use: Yes     Comment: beer     Drug use: No      Past Surgical History:   Procedure Laterality Date    COLONOSCOPY  12/05/2016    JOINT REPLACEMENT Left     knee    KNEE ARTHROPLASTY       Family History   Problem Relation Age of Onset    Heart Disease Mother     Cancer Mother         Brain    Cancer Father         colon, Pancreatic    Heart Attack Father     Diabetes Father      Past Medical History:   Diagnosis Date    Abscess     Hyperlipidemia     Hypertension     Impaired fasting glucose     Psoriasis        Vitals:    10/15/24 0917   BP: 138/80   Pulse: 85   Temp: 97.8 °F (36.6 °C)   SpO2: 96%   Weight: 85.7 kg (189 lb)   Height: 1.651 m (5' 5\")     BP Readings from Last 3 Encounters:   10/15/24 138/80   09/16/24 (!) 140/80   08/24/24 (!) 142/84        Physical Exam  Vitals and nursing note reviewed.   Constitutional:       Appearance: He is well-developed.   HENT:      Head: Normocephalic.      Right Ear: External ear normal.      Left Ear: External ear normal.      Nose: Nose normal.   Eyes:      Conjunctiva/sclera: Conjunctivae normal.      Pupils: Pupils are equal, round, and reactive to light.   Cardiovascular:      Rate and Rhythm: Normal rate.   Pulmonary:      Breath sounds: Normal breath sounds.   Abdominal:      General: Bowel sounds are normal.      Palpations: Abdomen is soft.   Musculoskeletal:         General: Normal range of motion.      Cervical back: Normal range of motion and neck supple.   Skin:     General: Skin is warm and dry.   Neurological:      Mental Status: He is alert and oriented to person,

## 2024-10-21 ENCOUNTER — OFFICE VISIT (OUTPATIENT)
Dept: PRIMARY CARE CLINIC | Age: 64
End: 2024-10-21
Payer: MEDICARE

## 2024-10-21 VITALS
TEMPERATURE: 98.2 F | HEART RATE: 94 BPM | WEIGHT: 189 LBS | SYSTOLIC BLOOD PRESSURE: 144 MMHG | BODY MASS INDEX: 31.49 KG/M2 | HEIGHT: 65 IN | DIASTOLIC BLOOD PRESSURE: 80 MMHG | OXYGEN SATURATION: 96 %

## 2024-10-21 DIAGNOSIS — K57.92 DIVERTICULITIS: Primary | ICD-10-CM

## 2024-10-21 DIAGNOSIS — E78.00 PURE HYPERCHOLESTEROLEMIA: ICD-10-CM

## 2024-10-21 DIAGNOSIS — R73.01 IMPAIRED FASTING GLUCOSE: ICD-10-CM

## 2024-10-21 DIAGNOSIS — I10 PRIMARY HYPERTENSION: ICD-10-CM

## 2024-10-21 PROCEDURE — 3077F SYST BP >= 140 MM HG: CPT | Performed by: FAMILY MEDICINE

## 2024-10-21 PROCEDURE — 4004F PT TOBACCO SCREEN RCVD TLK: CPT | Performed by: FAMILY MEDICINE

## 2024-10-21 PROCEDURE — G8427 DOCREV CUR MEDS BY ELIG CLIN: HCPCS | Performed by: FAMILY MEDICINE

## 2024-10-21 PROCEDURE — 99214 OFFICE O/P EST MOD 30 MIN: CPT | Performed by: FAMILY MEDICINE

## 2024-10-21 PROCEDURE — 3017F COLORECTAL CA SCREEN DOC REV: CPT | Performed by: FAMILY MEDICINE

## 2024-10-21 PROCEDURE — 1123F ACP DISCUSS/DSCN MKR DOCD: CPT | Performed by: FAMILY MEDICINE

## 2024-10-21 PROCEDURE — G8417 CALC BMI ABV UP PARAM F/U: HCPCS | Performed by: FAMILY MEDICINE

## 2024-10-21 PROCEDURE — 3079F DIAST BP 80-89 MM HG: CPT | Performed by: FAMILY MEDICINE

## 2024-10-21 PROCEDURE — G8484 FLU IMMUNIZE NO ADMIN: HCPCS | Performed by: FAMILY MEDICINE

## 2024-10-21 ASSESSMENT — ENCOUNTER SYMPTOMS
RESPIRATORY NEGATIVE: 1
EYES NEGATIVE: 1
GASTROINTESTINAL NEGATIVE: 1
ALLERGIC/IMMUNOLOGIC NEGATIVE: 1

## 2024-10-21 NOTE — PROGRESS NOTES
10/21/24     Mg Jaffe    : 1960 Sex: male   Age: 64 y.o.      Chief Complaint   Patient presents with    Abdominal Pain    Follow-up       Prior to Admission medications    Medication Sig Start Date End Date Taking? Authorizing Provider   ciprofloxacin (CIPRO) 500 MG tablet Take 1 tablet by mouth 2 times daily for 10 days 10/15/24 10/25/24 Yes Ravinder Rader DO   lisinopril (PRINIVIL;ZESTRIL) 5 MG tablet Take 1 tablet by mouth daily 24  Yes Ravinder Rader DO   metFORMIN (GLUCOPHAGE-XR) 500 MG extended release tablet Take 1 tablet by mouth daily with food 24  Yes Ravinder Rader DO   amLODIPine (NORVASC) 5 MG tablet take 1 tablet by mouth once daily 24  Yes Ravinder Rader DO   pravastatin (PRAVACHOL) 40 MG tablet take 1 tablet by mouth once daily 24  Yes Ravinder Rader DO   COSENTYX SENSOREADY, 300 MG, 150 MG/ML SOAJ  23  Yes Provider, Historical, MD   FLOMAX 0.4 MG capsule 1 hs 23  Yes Ravinder Rader DO   ADBRY 150 MG/ML SOSY  23  Yes Provider, Historical, MD   betamethasone dipropionate 0.05 % cream apply to affected area twice a day 23  Yes Provider, Historical, MD   tacrolimus (PROTOPIC) 0.1 % ointment apply to affected area twice a day 23  Yes Provider, Historical, MD   diclofenac sodium (VOLTAREN) 1 % GEL Apply 4 g topically 4 times daily 20  Yes Flynn Weinberg DPM   triamcinolone (KENALOG) 0.1 % cream Apply topically 2 times daily. 20  Yes Marcelle Wong PA   desoximetasone (TOPICORT) 0.25 % cream Apply topically 2 times daily. 20  Yes Ravinder Rader DO   sildenafil (REVATIO) 20 MG tablet 2-3 tabs as needed   Yes Provider, Historical, MD          HPI: Ida is seen today in follow-up on abdominal discomfort.  He is doing well.  Diverticulitis improved with Cipro we will complete antibiotics.  Recurrent problems to call.  Hypertension hyperlipidemia impaired fasting glucose all well-controlled meds  Itching  Elimination diet    ------------------------------------------  Wt loss   Consider adding wellbutrin (bupropion ) to the naltrexone, escitalopram for decr appetite. Will require monthly follow up on weight.   Make apt on way out. 20 minute visit ( schedule as integrative weight management follow up )  Change time of workouts.   HIIT training   Calorie tracking/ diet. Macros   Fat, protein, carb content   Perimenopause    ---------  Constipation  Constipation Remedies.   Mikala root 500 mg 3-4 times daily will help to improve gut motility (Reputable brands: [employer] green top or [employer])    Triphala - made of 3 fruits - 1-2 g/day 5-7 days, then increase to 3-4 grams/day if needed.    Continue ground flax seed 1-2 Tablespoons twice daily.    Steep 1-2 Tablespoons whole flaxseed in 2 C boiling water for 5-10 minutes (no longer).  Strain. Drink throughout the day.    Continue Triple Mg 2 capsules at night    Psyllium is a fiber that acts as a bulking agent - make sure to take it with a lot of water, or it can exacerbate the constipation.         PE:  Gen: NAD  Head: NCAT  ENT: MMM,  Chest: WWP  Lungs: Symmetrical chest rise  Abdomen: ND  Ext: No gross deformities  Neuro: awake and alert, ambulatory with steady gait.   Skin: no rashes

## 2024-11-19 RX ORDER — METFORMIN HYDROCHLORIDE 500 MG/1
TABLET, EXTENDED RELEASE ORAL
Qty: 90 TABLET | Refills: 0 | Status: SHIPPED
Start: 2024-11-19 | End: 2024-11-19 | Stop reason: SDUPTHER

## 2024-11-19 RX ORDER — METFORMIN HYDROCHLORIDE 500 MG/1
TABLET, EXTENDED RELEASE ORAL
Qty: 90 TABLET | Refills: 0 | Status: SHIPPED | OUTPATIENT
Start: 2024-11-19

## 2024-12-16 ENCOUNTER — HOSPITAL ENCOUNTER (OUTPATIENT)
Age: 64
Discharge: HOME | End: 2024-12-16
Payer: MEDICARE

## 2024-12-16 ENCOUNTER — OFFICE VISIT (OUTPATIENT)
Dept: PRIMARY CARE CLINIC | Age: 64
End: 2024-12-16
Payer: MEDICARE

## 2024-12-16 VITALS
OXYGEN SATURATION: 98 % | TEMPERATURE: 98.2 F | SYSTOLIC BLOOD PRESSURE: 140 MMHG | HEART RATE: 94 BPM | DIASTOLIC BLOOD PRESSURE: 70 MMHG | HEIGHT: 65 IN | BODY MASS INDEX: 31.99 KG/M2 | WEIGHT: 192 LBS

## 2024-12-16 DIAGNOSIS — R73.01 IMPAIRED FASTING GLUCOSE: ICD-10-CM

## 2024-12-16 DIAGNOSIS — R05.1 ACUTE COUGH: Primary | ICD-10-CM

## 2024-12-16 DIAGNOSIS — I10 PRIMARY HYPERTENSION: ICD-10-CM

## 2024-12-16 DIAGNOSIS — E78.00 PURE HYPERCHOLESTEROLEMIA: ICD-10-CM

## 2024-12-16 LAB
ALBUMIN SERPL-MCNC: 4.3 G/DL (ref 3.5–5.2)
ALP SERPL-CCNC: 116 U/L (ref 40–129)
ALT SERPL-CCNC: 10 U/L (ref 0–40)
ANION GAP SERPL CALCULATED.3IONS-SCNC: 10 MMOL/L (ref 7–16)
AST SERPL-CCNC: 13 U/L (ref 0–39)
BILIRUB SERPL-MCNC: 0.4 MG/DL (ref 0–1.2)
BUN SERPL-MCNC: 18 MG/DL (ref 6–23)
CALCIUM SERPL-MCNC: 9.5 MG/DL (ref 8.6–10.2)
CHLORIDE SERPL-SCNC: 103 MMOL/L (ref 98–107)
CHOLEST SERPL-MCNC: 185 MG/DL
CO2 SERPL-SCNC: 27 MMOL/L (ref 22–29)
CREAT SERPL-MCNC: 0.8 MG/DL (ref 0.7–1.2)
GFR, ESTIMATED: >90 ML/MIN/1.73M2
GLUCOSE SERPL-MCNC: 133 MG/DL (ref 74–99)
HBA1C MFR BLD: 6.3 % (ref 4–5.6)
HDLC SERPL-MCNC: 36 MG/DL
LDLC SERPL CALC-MCNC: 132 MG/DL
Lab: NORMAL
PERFORMING INSTRUMENT: NORMAL
POTASSIUM SERPL-SCNC: 3.7 MMOL/L (ref 3.5–5)
PROT SERPL-MCNC: 7.4 G/DL (ref 6.4–8.3)
QC PASS/FAIL: NORMAL
SARS-COV-2, POC: NORMAL
SODIUM SERPL-SCNC: 140 MMOL/L (ref 132–146)
TRIGL SERPL-MCNC: 84 MG/DL
VLDLC SERPL CALC-MCNC: 17 MG/DL

## 2024-12-16 PROCEDURE — G8427 DOCREV CUR MEDS BY ELIG CLIN: HCPCS | Performed by: FAMILY MEDICINE

## 2024-12-16 PROCEDURE — 1159F MED LIST DOCD IN RCRD: CPT | Performed by: FAMILY MEDICINE

## 2024-12-16 PROCEDURE — G8484 FLU IMMUNIZE NO ADMIN: HCPCS | Performed by: FAMILY MEDICINE

## 2024-12-16 PROCEDURE — 4004F PT TOBACCO SCREEN RCVD TLK: CPT | Performed by: FAMILY MEDICINE

## 2024-12-16 PROCEDURE — 87426 SARSCOV CORONAVIRUS AG IA: CPT | Performed by: FAMILY MEDICINE

## 2024-12-16 PROCEDURE — 99214 OFFICE O/P EST MOD 30 MIN: CPT | Performed by: FAMILY MEDICINE

## 2024-12-16 PROCEDURE — 83036 HEMOGLOBIN GLYCOSYLATED A1C: CPT

## 2024-12-16 PROCEDURE — G8417 CALC BMI ABV UP PARAM F/U: HCPCS | Performed by: FAMILY MEDICINE

## 2024-12-16 PROCEDURE — 3077F SYST BP >= 140 MM HG: CPT | Performed by: FAMILY MEDICINE

## 2024-12-16 PROCEDURE — 3078F DIAST BP <80 MM HG: CPT | Performed by: FAMILY MEDICINE

## 2024-12-16 PROCEDURE — 1123F ACP DISCUSS/DSCN MKR DOCD: CPT | Performed by: FAMILY MEDICINE

## 2024-12-16 PROCEDURE — 80061 LIPID PANEL: CPT

## 2024-12-16 PROCEDURE — 80053 COMPREHEN METABOLIC PANEL: CPT

## 2024-12-16 PROCEDURE — 3017F COLORECTAL CA SCREEN DOC REV: CPT | Performed by: FAMILY MEDICINE

## 2024-12-16 PROCEDURE — 36415 COLL VENOUS BLD VENIPUNCTURE: CPT

## 2024-12-16 RX ORDER — AZITHROMYCIN 250 MG/1
TABLET, FILM COATED ORAL
Qty: 1 PACKET | Refills: 0 | Status: SHIPPED | OUTPATIENT
Start: 2024-12-16

## 2024-12-16 RX ORDER — PREDNISONE 5 MG/1
TABLET ORAL
Qty: 30 TABLET | Refills: 0 | Status: SHIPPED | OUTPATIENT
Start: 2024-12-16

## 2024-12-16 ASSESSMENT — ENCOUNTER SYMPTOMS
ALLERGIC/IMMUNOLOGIC NEGATIVE: 1
EYES NEGATIVE: 1
COUGH: 1
GASTROINTESTINAL NEGATIVE: 1

## 2024-12-16 NOTE — PROGRESS NOTES
Vaping Use    Vaping status: Never Used   Substance Use Topics    Alcohol use: Yes     Comment: beer     Drug use: No      Past Surgical History:   Procedure Laterality Date    COLONOSCOPY  12/05/2016    JOINT REPLACEMENT Left     knee    KNEE ARTHROPLASTY       Family History   Problem Relation Age of Onset    Heart Disease Mother     Cancer Mother         Brain    Cancer Father         colon, Pancreatic    Heart Attack Father     Diabetes Father      Past Medical History:   Diagnosis Date    Abscess     Hyperlipidemia     Hypertension     Impaired fasting glucose     Psoriasis        Vitals:    12/16/24 1637   BP: (!) 140/70   Pulse: 94   Temp: 98.2 °F (36.8 °C)   SpO2: 98%   Weight: 87.1 kg (192 lb)   Height: 1.651 m (5' 5\")     BP Readings from Last 3 Encounters:   12/16/24 (!) 140/70   10/21/24 (!) 144/80   10/15/24 138/80        Physical Exam  Vitals and nursing note reviewed.   Constitutional:       Appearance: He is well-developed.   HENT:      Head: Normocephalic.      Right Ear: External ear normal.      Left Ear: External ear normal.      Nose: Congestion present.   Eyes:      Conjunctiva/sclera: Conjunctivae normal.      Pupils: Pupils are equal, round, and reactive to light.   Cardiovascular:      Rate and Rhythm: Normal rate.   Pulmonary:      Breath sounds: Normal breath sounds.   Abdominal:      General: Bowel sounds are normal.      Palpations: Abdomen is soft.   Musculoskeletal:         General: Normal range of motion.      Cervical back: Normal range of motion and neck supple.   Skin:     General: Skin is warm and dry.   Neurological:      Mental Status: He is alert and oriented to person, place, and time.   Psychiatric:         Behavior: Behavior normal.     Today's exam findings are all stable.  We will maintain current meds and care.  Addition of Z-Ponce prednisone as prescribed.  Follow-up visit with me 3 months and sooner if problems.  Blood work again at that time.        Lab Results

## 2025-02-23 NOTE — ED NOTES
Discharge instructions given. Patient verbalizes understanding. No other noted or stated problems at this time. Patient will follow up with primary care.   Pain Management H&P    History Of Present Illness  Stephanie Gallardo is a 58 y.o. female presents for procedure state below. Endorses no changes in past medical history or medical health since last seen in clinic.      Past Medical History  She has a past medical history of Acute atopic conjunctivitis, bilateral (2018), Anxiety disorder, unspecified (2017), Bitten or stung by nonvenomous insect and other nonvenomous arthropods, initial encounter (2014), Cellulitis of left toe (2017), Cellulitis of right toe (2017), Cellulitis of unspecified toe (2017), Complete or unspecified spontaneous  without complication, Contusion of left forearm, initial encounter (10/12/2016), COVID-19 (2022), Dry eye syndrome of bilateral lacrimal glands (2019), Encounter for gynecological examination (general) (routine) without abnormal findings (2018), Encounter for gynecological examination (general) (routine) without abnormal findings (2016), Encounter for screening for cardiovascular disorders (06/10/2019), Encounter for screening for diabetes mellitus (2019), Encounter for screening for other disorder (2019), Encounter for screening for other viral diseases (2015), Encounter for screening, unspecified (2019), Foreign body in conjunctival sac, left eye, initial encounter (2020), Foreign body in conjunctival sac, right eye, initial encounter (2020), Ingrowing nail (2017), Injury of conjunctiva and corneal abrasion without foreign body, right eye, initial encounter (2019), Injury of conjunctiva and corneal abrasion without foreign body, right eye, initial encounter (2019), Localized edema (2018), Melena (2018), Onycholysis (2017), Other chest pain (2017), Other chest pain (2019), Other conditions influencing health status, Other conditions influencing health status (02/10/2022),  Other conditions influencing health status, Other conditions influencing health status (10/24/2013), Pain in left knee (03/09/2016), Pain in right shoulder (11/03/2014), Personal history of diseases of the skin and subcutaneous tissue (08/05/2021), Personal history of other benign neoplasm (07/23/2018), Personal history of other diseases of the female genital tract, Personal history of other diseases of the female genital tract (08/15/2013), Personal history of other diseases of the musculoskeletal system and connective tissue (08/15/2013), Personal history of other diseases of the respiratory system (04/24/2018), Personal history of other diseases of the respiratory system (10/31/2018), Personal history of other diseases of the respiratory system (04/24/2018), Personal history of other endocrine, nutritional and metabolic disease (02/22/2019), Personal history of other endocrine, nutritional and metabolic disease (01/07/2019), Personal history of other infectious and parasitic diseases, Personal history of other medical treatment, Personal history of other medical treatment (10/24/2013), Personal history of other mental and behavioral disorders (04/24/2018), Personal history of other mental and behavioral disorders (04/24/2018), Personal history of other specified conditions (07/12/2018), Personal history of other specified conditions (01/09/2020), Personal history of other specified conditions (01/10/2021), Personal history of other specified conditions (06/08/2015), Personal history of other specified conditions (10/24/2013), Personal history of other specified conditions (09/19/2017), Personal history of other specified conditions (02/20/2018), Personal history of other specified conditions (03/11/2022), Pyogenic granuloma (08/09/2017), Segmental and somatic dysfunction of cervical region (07/23/2018), Segmental and somatic dysfunction of cervical region (11/10/2014), Segmental and somatic dysfunction of rib  cage (2014), Segmental and somatic dysfunction of thoracic region (2018), Segmental and somatic dysfunction of thoracic region (11/10/2014), Segmental and somatic dysfunction of upper extremity (11/10/2014), Strain of muscle, fascia and tendon of unspecified hip, initial encounter (2017), Syncope and collapse (2019), Twin pregnancy, unspecified number of placenta and unspecified number of amniotic sacs, unspecified trimester ([facility]), Unspecified astigmatism, bilateral (2022), Unspecified astigmatism, unspecified eye (2019), Unspecified disorder of synovium and tendon, unspecified shoulder (2014), and Unspecified injury of right lower leg, initial encounter (2017).    Surgical History  She has a past surgical history that includes Other surgical history (08/15/2013); Gastric bypass (08/15/2013); Gastric bypass (10/29/2021); Other surgical history (2018); Laparoscopy diagnostic / biopsy / aspiration / lysis (2014); Other surgical history (2014);  section, classic (2014); CT angio coronary art with heartflow if score >30% (2019); and Shoulder open rotator cuff repair (Left).     Social History  She reports that she has never smoked. She has never used smokeless tobacco. She reports current alcohol use of about 1.0 standard drink of alcohol per week. She reports that she does not use drugs.    Family History  Family History   Problem Relation Name Age of Onset    Other (cardiac disorder) Mother      Diabetes Mother      Hypertension Mother      Stroke Mother      Other (trauma) Father      Bone cancer Sister      Diabetes Sister      Epilepsy Sister      Hypertension Sister      Lung cancer Sister      Stroke Sister      Other (grand mal seizure) Sister      Breast cancer Sister      Autism Son      Other (allergic disorder) Child      Asthma Child      Eczema Child      Other (food allergy) Child      Other (sinus problem) Child       Cancer Mother's Sister      Cancer Maternal Cousin      Diabetes Other Grandmother         Allergies  Bee pollen, Cat dander, Dog dander, Grass pollen, Other, Ragweed pollen, and Amoxicillin-pot clavulanate    Review of Symptoms:   Constitutional: Negative for chills, diaphoresis or fever  HENT: Negative for neck swelling  Eyes:.  Negative for eye pain  Respiratory:.  Negative for cough, shortness of breath or wheezing    Cardiovascular:.  Negative for chest pain or palpitations  Gastrointestinal:.  Negative for abdominal pain, nausea and vomiting  Genitourinary:.  Negative for urgency  Musculoskeletal:  Positive for back pain. Positive for joint pain. Denies falls within the past 3 months.  Skin: Negative for wounds or itching   Neurological: Negative for dizziness, seizures, loss of consciousness and weakness  Endo/Heme/Allergies: Does not bruise/bleed easily  Psychiatric/Behavioral: Negative for depression. The patient does not appear anxious.      Pre-sedation Evaluation  ASA class 3  Mallampati score 2     PHYSICAL EXAM  Vitals signs reviewed  Constitutional:       General: Not in acute distress     Appearance: Normal appearance. Not ill-appearing.  HENT:     Head: Normocephalic and atraumatic  Eyes:     Conjunctiva/sclera: Conjunctivae normal  Cardiovascular:     Rate and Rhythm: Normal rate and regular rhythm  Pulmonary:     Effort: No respiratory distress  Abdominal:     Palpations: Abdomen is soft  Musculoskeletal: CLANCY  Skin:     General: Skin is warm and dry  Neurological:     General: No focal deficit present  Psychiatric:         Mood and Affect: Mood normal         Behavior: Behavior normal     Last Recorded Vitals  There were no vitals taken for this visit.    Relevant Results  Current Outpatient Medications   Medication Instructions    albuterol 90 mcg/actuation inhaler Inhale.    aluminum hydrox-magnesium carb (Gaviscon Extra Strength) 160-105 mg tablet,chewable 2 tablets, oral, 4 times daily,  After each meal and at bedtime.    azelastine (Astelin) 137 mcg (0.1 %) nasal spray 2 sprays, Each Nostril, 2 times daily PRN, Use in each nostril as directed    beclomethasone (QNASL) 80 mcg/actuation HFA aerosol inhaler 2 sprays, Each Nostril, Daily    calcium citrate-vitamin D2 250 mg-2.5 mcg (100 unit) tablet 1 tablet, oral, 2 times daily    cholecalciferol (VITAMIN D3) 100 mcg, oral, Daily    cycloSPORINE (Restasis) 0.05 % ophthalmic emulsion 1 drop, Every 12 hours    diphenoxylate-atropine (Lomotil) 2.5-0.025 mg tablet 1 tablet, oral, 2 times daily PRN    DULoxetine (CYMBALTA) 60 mg, oral, Daily    ferrous sulfate 325 (65 Fe) MG EC tablet 1 tablet, oral, 3 times weekly, Do not crush, chew, or split.    fluticasone (Flonase) 50 mcg/actuation nasal spray Administer into each nostril.    gabapentin (NEURONTIN) 300 mg, oral, Every 12 hours scheduled (0630,1830)    levocetirizine (XYZAL) 5 mg, oral, Daily    levocetirizine (XYZAL) 5 mg, oral, Daily    levothyroxine (SYNTHROID, LEVOXYL) 75 mcg, oral, Daily    lidocaine (Xylocaine) 5 % ointment Topical, As needed    Nystop 100,000 unit/gram powder 1 Application    ondansetron (ZOFRAN) 4 mg, oral, Every 8 hours PRN    valACYclovir (VALTREX) 500 mg, oral, Daily       No results found for this or any previous visit from the past 1000 days.     No image results found.       No diagnosis found.     ASSESSMENT/PLAN  Stephanie Gallardo is a 58 y.o. female here for L5-S1 interlaminar epidural steroid injection under fluoroscopy    Patient denies any recent antibiotic use or infections, denies any blood thinner use, and denies contrast or local anesthetic allergies     Risks, benefits, alternatives discussed. All questions answered to the best of my ability. Patient agrees to proceed.      Our plan is as follows:  - Proceed with aforementioned procedure          Alberto Braga MD   Pain fellow

## 2025-03-04 RX ORDER — LISINOPRIL 5 MG/1
5 TABLET ORAL DAILY
Qty: 90 TABLET | Refills: 0 | Status: SHIPPED | OUTPATIENT
Start: 2025-03-04

## 2025-03-04 RX ORDER — AMLODIPINE BESYLATE 5 MG/1
5 TABLET ORAL DAILY
Qty: 90 TABLET | Refills: 0 | Status: SHIPPED | OUTPATIENT
Start: 2025-03-04

## 2025-03-04 RX ORDER — PRAVASTATIN SODIUM 40 MG
40 TABLET ORAL DAILY
Qty: 90 TABLET | Refills: 0 | Status: SHIPPED | OUTPATIENT
Start: 2025-03-04

## 2025-03-15 ENCOUNTER — HOSPITAL ENCOUNTER (OUTPATIENT)
Age: 65
Discharge: HOME | End: 2025-03-15
Payer: MEDICARE

## 2025-03-15 DIAGNOSIS — R73.01 IMPAIRED FASTING GLUCOSE: ICD-10-CM

## 2025-03-15 DIAGNOSIS — I10 PRIMARY HYPERTENSION: ICD-10-CM

## 2025-03-15 DIAGNOSIS — E78.00 PURE HYPERCHOLESTEROLEMIA: ICD-10-CM

## 2025-03-15 LAB
ALBUMIN SERPL-MCNC: 4.2 G/DL (ref 3.5–5.2)
ALP SERPL-CCNC: 106 U/L (ref 40–129)
ALT SERPL-CCNC: 15 U/L (ref 0–40)
ANION GAP SERPL CALCULATED.3IONS-SCNC: 10 MMOL/L (ref 7–16)
AST SERPL-CCNC: 15 U/L (ref 0–39)
BILIRUB SERPL-MCNC: 0.4 MG/DL (ref 0–1.2)
BUN SERPL-MCNC: 18 MG/DL (ref 6–23)
CALCIUM SERPL-MCNC: 9.2 MG/DL (ref 8.6–10.2)
CHLORIDE SERPL-SCNC: 106 MMOL/L (ref 98–107)
CHOLEST SERPL-MCNC: 168 MG/DL
CO2 SERPL-SCNC: 26 MMOL/L (ref 22–29)
CREAT SERPL-MCNC: 0.7 MG/DL (ref 0.7–1.2)
GFR, ESTIMATED: >90 ML/MIN/1.73M2
GLUCOSE SERPL-MCNC: 140 MG/DL (ref 74–99)
HBA1C MFR BLD: 6.4 % (ref 4–5.6)
HDLC SERPL-MCNC: 35 MG/DL
LDLC SERPL CALC-MCNC: 117 MG/DL
POTASSIUM SERPL-SCNC: 4 MMOL/L (ref 3.5–5)
PROT SERPL-MCNC: 7.2 G/DL (ref 6.4–8.3)
SODIUM SERPL-SCNC: 142 MMOL/L (ref 132–146)
TRIGL SERPL-MCNC: 82 MG/DL
VLDLC SERPL CALC-MCNC: 16 MG/DL

## 2025-03-15 PROCEDURE — 80061 LIPID PANEL: CPT

## 2025-03-15 PROCEDURE — 83036 HEMOGLOBIN GLYCOSYLATED A1C: CPT

## 2025-03-15 PROCEDURE — 80053 COMPREHEN METABOLIC PANEL: CPT

## 2025-03-15 PROCEDURE — 36415 COLL VENOUS BLD VENIPUNCTURE: CPT

## 2025-03-18 ENCOUNTER — OFFICE VISIT (OUTPATIENT)
Dept: PRIMARY CARE CLINIC | Age: 65
End: 2025-03-18
Payer: MEDICARE

## 2025-03-18 VITALS
OXYGEN SATURATION: 96 % | BODY MASS INDEX: 32.82 KG/M2 | WEIGHT: 197 LBS | SYSTOLIC BLOOD PRESSURE: 138 MMHG | DIASTOLIC BLOOD PRESSURE: 80 MMHG | HEART RATE: 90 BPM | HEIGHT: 65 IN | TEMPERATURE: 98.3 F

## 2025-03-18 DIAGNOSIS — L40.9 PSORIASIS: ICD-10-CM

## 2025-03-18 DIAGNOSIS — F17.200 SMOKER: ICD-10-CM

## 2025-03-18 DIAGNOSIS — I10 PRIMARY HYPERTENSION: Primary | ICD-10-CM

## 2025-03-18 DIAGNOSIS — R73.01 IMPAIRED FASTING GLUCOSE: ICD-10-CM

## 2025-03-18 DIAGNOSIS — E78.00 PURE HYPERCHOLESTEROLEMIA: ICD-10-CM

## 2025-03-18 PROCEDURE — G8417 CALC BMI ABV UP PARAM F/U: HCPCS | Performed by: FAMILY MEDICINE

## 2025-03-18 PROCEDURE — 99214 OFFICE O/P EST MOD 30 MIN: CPT | Performed by: FAMILY MEDICINE

## 2025-03-18 PROCEDURE — 3079F DIAST BP 80-89 MM HG: CPT | Performed by: FAMILY MEDICINE

## 2025-03-18 PROCEDURE — 3075F SYST BP GE 130 - 139MM HG: CPT | Performed by: FAMILY MEDICINE

## 2025-03-18 PROCEDURE — 1159F MED LIST DOCD IN RCRD: CPT | Performed by: FAMILY MEDICINE

## 2025-03-18 PROCEDURE — 3017F COLORECTAL CA SCREEN DOC REV: CPT | Performed by: FAMILY MEDICINE

## 2025-03-18 PROCEDURE — G8427 DOCREV CUR MEDS BY ELIG CLIN: HCPCS | Performed by: FAMILY MEDICINE

## 2025-03-18 PROCEDURE — 1123F ACP DISCUSS/DSCN MKR DOCD: CPT | Performed by: FAMILY MEDICINE

## 2025-03-18 PROCEDURE — 4004F PT TOBACCO SCREEN RCVD TLK: CPT | Performed by: FAMILY MEDICINE

## 2025-03-18 ASSESSMENT — PATIENT HEALTH QUESTIONNAIRE - PHQ9
2. FEELING DOWN, DEPRESSED OR HOPELESS: NOT AT ALL
SUM OF ALL RESPONSES TO PHQ QUESTIONS 1-9: 0
1. LITTLE INTEREST OR PLEASURE IN DOING THINGS: NOT AT ALL
SUM OF ALL RESPONSES TO PHQ QUESTIONS 1-9: 0

## 2025-03-18 NOTE — PROGRESS NOTES
Lab Results   Component Value Date    TRIG 82 03/15/2025    TRIG 84 12/16/2024     Lab Results   Component Value Date    HDL 35 (L) 03/15/2025    HDL 36 (L) 12/16/2024     No components found for: \"LDLCHOLESTEROL\", \"LDLCALC\"  Lab Results   Component Value Date    VLDL 16 03/15/2025    VLDL 17 12/16/2024     Lab Results   Component Value Date    CHOLHDLRATIO 4.7 02/27/2021    CHOLHDLRATIO 4.9 01/18/2020     Lab Results   Component Value Date    WBC 7.2 10/15/2024    HGB 15.2 10/15/2024    HCT 46.9 10/15/2024    MCV 85.6 10/15/2024     10/15/2024    LYMPHOPCT 12 (L) 10/15/2024    RBC 5.48 10/15/2024    MCH 27.7 10/15/2024    MCHC 32.4 10/15/2024    RDW 13.6 10/15/2024     Lab Results   Component Value Date     03/15/2025    K 4.0 03/15/2025     03/15/2025    CO2 26 03/15/2025    BUN 18 03/15/2025    CREATININE 0.7 03/15/2025    GLUCOSE 140 (H) 03/15/2025    CALCIUM 9.2 03/15/2025    BILITOT 0.4 03/15/2025    ALKPHOS 106 03/15/2025    AST 15 03/15/2025    ALT 15 03/15/2025    LABGLOM >90 03/15/2025    GFRAA >60 07/18/2022        Lab Results   Component Value Date    PSA 1.76 05/06/2023    PSA 1.94 03/19/2022      Lab Results   Component Value Date    LABA1C 6.4 (H) 03/15/2025    LABA1C 6.3 (H) 12/16/2024    LABA1C 6.5 (H) 09/14/2024     No results found for: \"EAG\"      Plan Per Assessment:  Mg was seen today for discuss labs.    Diagnoses and all orders for this visit:    Primary hypertension    Pure hypercholesterolemia    Impaired fasting glucose    Psoriasis    Smoker            No follow-ups on file.      Ravinder Rader DO    Note was generated with the assistance of voice recognition software.  Document was reviewed however may contain grammatical errors.

## 2025-04-27 NOTE — PROGRESS NOTES
21     Mg Jaffe    : 1960 Sex: male   Age: 56 y.o. Chief Complaint   Patient presents with   [address]       Prior to Admission medications    Medication Sig Start Date End Date Taking? Authorizing Provider   methylPREDNISolone (MEDROL DOSEPACK) 4 MG tablet Take by mouth as directed. 21  Yes Ivory Said DO Prasanth   hydrOXYzine (ATARAX) 10 MG tablet take 1 tablet by mouth four times a day if needed 21  Yes Historical Provider, MD   hydroCHLOROthiazide (HYDRODIURIL) 25 MG tablet Take 1 tablet by mouth every morning 3/4/21  Yes Ivory Said Prasanth, DO   pravastatin (PRAVACHOL) 40 MG tablet Take 1 tablet by mouth daily 20  Yes Ivory Said Prasanth DO   amLODIPine (NORVASC) 5 MG tablet Take 1 tablet by mouth daily 1 tablet daily 20  Yes Ivory Said DO Prasanth   metFORMIN (GLUCOPHAGE-XR) 500 MG extended release tablet take 1 tablet by mouth once daily with food 20  Yes Ivory Said DO Prasanth   diclofenac sodium (VOLTAREN) 1 % GEL Apply 4 g topically 4 times daily 20  Yes Nohemy Leonard DPM   triamcinolone (KENALOG) 0.1 % cream Apply topically 2 times daily. 20  Yes LEONARDO Kang   desoximetasone (TOPICORT) 0.25 % cream Apply topically 2 times daily. 20  Yes Ravinder Rader DO   adalimumab (HUMIRA PEN) 40 MG/0.8ML injection Inject into the skin   Yes Historical Provider, MD   sildenafil (REVATIO) 20 MG tablet 2-3 tabs as needed   Yes Historical Provider, MD          HPI: Patient seen this evening with hypertension impaired fasting glucose hyperlipidemia chronic smoking history. Overall medically doing well and meds well-tolerated. Recent labs stable. Reviewed today. Problems with left hip pain etiology uncertain no known trauma. X-rays will be completed bilaterally of the hips and then further discussion next week. Addition of a Medrol pack today. Review of Systems   Constitutional: Negative. HENT: Negative. Eyes: Negative. Respiratory: Negative. Gastrointestinal: Negative. Endocrine: Negative. Genitourinary: Negative. Musculoskeletal: Positive for arthralgias. Skin: Negative. Allergic/Immunologic: Negative. Neurological: Negative. Hematological: Negative. Psychiatric/Behavioral: Negative. Complaints of left hip pain difficulty with ambulation. Difficulty with forward bending. Current Outpatient Medications:     methylPREDNISolone (MEDROL DOSEPACK) 4 MG tablet, Take by mouth as directed., Disp: 21 tablet, Rfl: 0    hydrOXYzine (ATARAX) 10 MG tablet, take 1 tablet by mouth four times a day if needed, Disp: , Rfl:     hydroCHLOROthiazide (HYDRODIURIL) 25 MG tablet, Take 1 tablet by mouth every morning, Disp: 90 tablet, Rfl: 3    pravastatin (PRAVACHOL) 40 MG tablet, Take 1 tablet by mouth daily, Disp: 90 tablet, Rfl: 3    amLODIPine (NORVASC) 5 MG tablet, Take 1 tablet by mouth daily 1 tablet daily, Disp: 90 tablet, Rfl: 3    metFORMIN (GLUCOPHAGE-XR) 500 MG extended release tablet, take 1 tablet by mouth once daily with food, Disp: 90 tablet, Rfl: 3    diclofenac sodium (VOLTAREN) 1 % GEL, Apply 4 g topically 4 times daily, Disp: 4 Tube, Rfl: 1    triamcinolone (KENALOG) 0.1 % cream, Apply topically 2 times daily. , Disp: 80 g, Rfl: 1    desoximetasone (TOPICORT) 0.25 % cream, Apply topically 2 times daily. , Disp: 30 g, Rfl: 1    adalimumab (HUMIRA PEN) 40 MG/0.8ML injection, Inject into the skin, Disp: , Rfl:     sildenafil (REVATIO) 20 MG tablet, 2-3 tabs as needed, Disp: , Rfl:     Allergies   Allergen Reactions    Mobic [Meloxicam] Hives       Social History     Tobacco Use    Smoking status: Heavy Tobacco Smoker     Packs/day: 1.50     Years: 30.00     Pack years: 45.00     Types: Cigarettes    Smokeless tobacco: Never Used   Substance Use Topics    Alcohol use: Yes     Comment: beer     Drug use: No      Past Surgical History:   Procedure Laterality Date    COLONOSCOPY 12/05/2016    JOINT REPLACEMENT Left     knee    KNEE ARTHROPLASTY       Family History   Problem Relation Age of Onset    Heart Disease Mother     Cancer Mother         Brain    Cancer Father         colon, Pancreatic    Heart Attack Father     Diabetes Father      Past Medical History:   Diagnosis Date    Abscess     Hyperlipidemia     Hypertension     Impaired fasting glucose     Psoriasis        Vitals:    06/08/21 1631   BP: 138/82   Pulse: 96   Resp: 16   Temp: 98.7 °F (37.1 °C)   SpO2: 96%   Weight: 196 lb (88.9 kg)   Height: 5' 6\" (1.676 m)     BP Readings from Last 3 Encounters:   06/08/21 138/82   03/04/21 130/80   11/25/20 (!) 140/70        Physical Exam  Vitals and nursing note reviewed. Constitutional:       Appearance: He is well-developed. HENT:      Head: Normocephalic. Right Ear: External ear normal.      Left Ear: External ear normal.      Nose: Nose normal.   Eyes:      Conjunctiva/sclera: Conjunctivae normal.      Pupils: Pupils are equal, round, and reactive to light. Cardiovascular:      Rate and Rhythm: Normal rate. Pulmonary:      Breath sounds: Normal breath sounds. Abdominal:      General: Bowel sounds are normal.      Palpations: Abdomen is soft. Musculoskeletal:         General: Normal range of motion. Cervical back: Normal range of motion and neck supple. Skin:     General: Skin is warm and dry. Neurological:      Mental Status: He is alert and oriented to person, place, and time. Psychiatric:         Behavior: Behavior normal.     Present vitals physical examination stable. Medications as prescribed. Addition of Medrol pack as noted. Bilateral hip x-rays and then further discussion when he returns.   No results found for: TSH, U6RKPWQ, T8QTIMD, THYROIDAB, FT3, T4FREE  Lab Results   Component Value Date    CHOL 163 06/05/2021    CHOL 184 02/27/2021     Lab Results   Component Value Date    TRIG 91 06/05/2021    TRIG 80 02/27/2021     Lab Results Component Value Date    HDL 32 06/05/2021    HDL 39 02/27/2021     No results found for: TIMOTHY [facility]  Lab Results   Component Value Date    LABVLDL 18 06/05/2021    LABVLDL 22 08/20/2020     Lab Results   Component Value Date    CHOLHDLRATIO 4.7 02/27/2021    CHOLHDLRATIO 4.9 01/18/2020     Lab Results   Component Value Date    WBC 4.6 10/11/2018    HGB 12.8 10/11/2018    HCT 36.2 (L) 10/11/2018    MCV 87.2 10/11/2018     10/11/2018    LYMPHOPCT 22.1 10/11/2018    RBC 4.15 10/11/2018    MCH 30.8 10/11/2018    MCHC 35.4 (H) 10/11/2018    RDW 13.7 10/11/2018     Lab Results   Component Value Date     06/05/2021    K 4.3 06/05/2021     06/05/2021    CO2 30 (H) 06/05/2021    BUN 16 06/05/2021    CREATININE 0.9 06/05/2021    GLUCOSE 134 (H) 06/05/2021    CALCIUM 9.4 06/05/2021    PROT 7.2 06/05/2021    LABALBU 4.2 06/05/2021    BILITOT 0.3 06/05/2021    ALKPHOS 88 06/05/2021    AST 15 06/05/2021    ALT 18 06/05/2021    LABGLOM >60 06/05/2021    GFRAA >60 06/05/2021      No results found for: PSA, PSADIA   Lab Results   Component Value Date    LABA1C 6.0 (H) 06/05/2021    LABA1C 6.0 02/27/2021    LABA1C 6.1 (H) 08/20/2020     No results found for: EAG         Plan Per Assessment:  Bettye Tejeda was seen today for discuss labs. Diagnoses and all orders for this visit:    Essential hypertension    Impaired fasting glucose    Hyperlipidemia, unspecified hyperlipidemia type    Psoriasis    Cigarette nicotine dependence without complication    Hip pain, left  -     XR HIP BILATERAL W AP PELVIS (2 VIEWS); Future    Other orders  -     methylPREDNISolone (MEDROL DOSEPACK) 4 MG tablet; Take by mouth as directed. No follow-ups on file. Sonia Narayan DO    Note was generated with the assistance of voice recognition software. Document was reviewed however may contain grammatical errors. Attending

## 2025-05-01 ENCOUNTER — OFFICE VISIT (OUTPATIENT)
Dept: PRIMARY CARE CLINIC | Age: 65
End: 2025-05-01
Payer: MEDICARE

## 2025-05-01 VITALS
HEART RATE: 87 BPM | OXYGEN SATURATION: 98 % | SYSTOLIC BLOOD PRESSURE: 132 MMHG | HEIGHT: 65 IN | BODY MASS INDEX: 32.15 KG/M2 | DIASTOLIC BLOOD PRESSURE: 78 MMHG | WEIGHT: 193 LBS | TEMPERATURE: 98 F

## 2025-05-01 DIAGNOSIS — L40.9 PSORIASIS: ICD-10-CM

## 2025-05-01 DIAGNOSIS — R73.01 IMPAIRED FASTING GLUCOSE: ICD-10-CM

## 2025-05-01 DIAGNOSIS — I10 PRIMARY HYPERTENSION: Primary | ICD-10-CM

## 2025-05-01 DIAGNOSIS — E78.00 PURE HYPERCHOLESTEROLEMIA: ICD-10-CM

## 2025-05-01 PROCEDURE — 3075F SYST BP GE 130 - 139MM HG: CPT | Performed by: FAMILY MEDICINE

## 2025-05-01 PROCEDURE — 1159F MED LIST DOCD IN RCRD: CPT | Performed by: FAMILY MEDICINE

## 2025-05-01 PROCEDURE — 3017F COLORECTAL CA SCREEN DOC REV: CPT | Performed by: FAMILY MEDICINE

## 2025-05-01 PROCEDURE — 3078F DIAST BP <80 MM HG: CPT | Performed by: FAMILY MEDICINE

## 2025-05-01 PROCEDURE — G8427 DOCREV CUR MEDS BY ELIG CLIN: HCPCS | Performed by: FAMILY MEDICINE

## 2025-05-01 PROCEDURE — 4004F PT TOBACCO SCREEN RCVD TLK: CPT | Performed by: FAMILY MEDICINE

## 2025-05-01 PROCEDURE — G8417 CALC BMI ABV UP PARAM F/U: HCPCS | Performed by: FAMILY MEDICINE

## 2025-05-01 PROCEDURE — 99214 OFFICE O/P EST MOD 30 MIN: CPT | Performed by: FAMILY MEDICINE

## 2025-05-01 PROCEDURE — 1123F ACP DISCUSS/DSCN MKR DOCD: CPT | Performed by: FAMILY MEDICINE

## 2025-05-01 RX ORDER — LISINOPRIL 10 MG/1
10 TABLET ORAL DAILY
Qty: 90 TABLET | Refills: 1 | Status: SHIPPED | OUTPATIENT
Start: 2025-05-01

## 2025-05-01 RX ORDER — LISINOPRIL 10 MG/1
10 TABLET ORAL DAILY
Qty: 90 TABLET | Refills: 1 | Status: SHIPPED | OUTPATIENT
Start: 2025-05-01 | End: 2025-05-01 | Stop reason: SDUPTHER

## 2025-05-01 RX ORDER — LISINOPRIL 20 MG/1
TABLET ORAL
Qty: 90 TABLET | Refills: 3 | Status: SHIPPED | OUTPATIENT
Start: 2025-05-01 | End: 2025-05-01

## 2025-05-01 NOTE — PROGRESS NOTES
25     Mg Jaffe    : 1960 Sex: male   Age: 64 y.o.      Chief Complaint   Patient presents with    Hypertension    Discuss Medications       Prior to Admission medications    Medication Sig Start Date End Date Taking? Authorizing Provider   lisinopril (PRINIVIL;ZESTRIL) 10 MG tablet Take 1 tablet by mouth daily 25  Yes Ravinder Rader DO   amLODIPine (NORVASC) 5 MG tablet TAKE ONE TABLET BY MOUTH ONCE DAILY 3/4/25  Yes Ravinder Rader DO   pravastatin (PRAVACHOL) 40 MG tablet TAKE ONE TABLET BY MOUTH ONCE DAILY 3/4/25  Yes Ravinder Rader DO   metFORMIN (GLUCOPHAGE-XR) 500 MG extended release tablet TAKE ONE TABLET BY MOUTH DAILY WITH FOOD 24  Yes Ravinder Rader DO   COSENTYX SENSOREADY, 300 MG, 150 MG/ML SOAJ  23  Yes ProviderNagi MD   FLOMAX 0.4 MG capsule 1 hs 23  Yes Ravinder Rader DO   ADBRY 150 MG/ML SOSY  23  Yes Provider, MD Nagi   betamethasone dipropionate 0.05 % cream apply to affected area twice a day 23  Yes Provider, MD Nagi   tacrolimus (PROTOPIC) 0.1 % ointment apply to affected area twice a day 23  Yes Provider, MD Nagi   diclofenac sodium (VOLTAREN) 1 % GEL Apply 4 g topically 4 times daily 20  Yes Flynn Weinberg DPM   triamcinolone (KENALOG) 0.1 % cream Apply topically 2 times daily. 20  Yes Marcelle Wong PA   desoximetasone (TOPICORT) 0.25 % cream Apply topically 2 times daily. 20  Yes Ravinder Rader DO   sildenafil (REVATIO) 20 MG tablet 2-3 tabs as needed   Yes Provider, MD Nagi          HPI: Patient evaluated today with hypertension hyperlipidemia impaired fasting glucose and history of psoriasis.  Clinically he is well.  Medications well-tolerated lisinopril now at 10 mg a day and pressure improved.  Norvasc will continue at 5 daily.  Reassessment with me then in 3 months.  Blood work again at that time.  Current A1c at 6.4.          Review of Systems

## 2025-06-10 RX ORDER — METFORMIN HYDROCHLORIDE 500 MG/1
TABLET, EXTENDED RELEASE ORAL
Qty: 90 TABLET | Refills: 1 | Status: SHIPPED | OUTPATIENT
Start: 2025-06-10

## 2025-06-10 RX ORDER — PRAVASTATIN SODIUM 40 MG
40 TABLET ORAL DAILY
Qty: 90 TABLET | Refills: 1 | Status: SHIPPED | OUTPATIENT
Start: 2025-06-10

## 2025-06-10 RX ORDER — AMLODIPINE BESYLATE 5 MG/1
5 TABLET ORAL DAILY
Qty: 90 TABLET | Refills: 1 | Status: SHIPPED | OUTPATIENT
Start: 2025-06-10

## 2025-07-26 ENCOUNTER — HOSPITAL ENCOUNTER (OUTPATIENT)
Age: 65
Discharge: HOME | End: 2025-07-26
Payer: MEDICARE

## 2025-07-26 DIAGNOSIS — R73.01 IMPAIRED FASTING GLUCOSE: ICD-10-CM

## 2025-07-26 DIAGNOSIS — I10 PRIMARY HYPERTENSION: ICD-10-CM

## 2025-07-26 DIAGNOSIS — E78.00 PURE HYPERCHOLESTEROLEMIA: ICD-10-CM

## 2025-07-26 LAB
ALBUMIN SERPL-MCNC: 4.3 G/DL (ref 3.5–5.2)
ALP SERPL-CCNC: 96 U/L (ref 40–129)
ALT SERPL-CCNC: 13 U/L (ref 0–50)
ANION GAP SERPL CALCULATED.3IONS-SCNC: 12 MMOL/L (ref 7–16)
AST SERPL-CCNC: 15 U/L (ref 0–50)
BILIRUB SERPL-MCNC: 0.4 MG/DL (ref 0–1.2)
BUN SERPL-MCNC: 20 MG/DL (ref 8–23)
CALCIUM SERPL-MCNC: 9.3 MG/DL (ref 8.8–10.2)
CHLORIDE SERPL-SCNC: 106 MMOL/L (ref 98–107)
CHOLEST SERPL-MCNC: 180 MG/DL
CO2 SERPL-SCNC: 25 MMOL/L (ref 22–29)
CREAT SERPL-MCNC: 0.8 MG/DL (ref 0.7–1.2)
GFR, ESTIMATED: >90 ML/MIN/1.73M2
GLUCOSE SERPL-MCNC: 134 MG/DL (ref 74–99)
HBA1C MFR BLD: 6.6 % (ref 4–5.6)
HDLC SERPL-MCNC: 39 MG/DL
LDLC SERPL CALC-MCNC: 123 MG/DL
POTASSIUM SERPL-SCNC: 3.9 MMOL/L (ref 3.5–5.1)
PROT SERPL-MCNC: 7.1 G/DL (ref 6.4–8.3)
SODIUM SERPL-SCNC: 142 MMOL/L (ref 136–145)
TRIGL SERPL-MCNC: 89 MG/DL
VLDLC SERPL CALC-MCNC: 18 MG/DL

## 2025-07-26 PROCEDURE — 80061 LIPID PANEL: CPT

## 2025-07-26 PROCEDURE — 36415 COLL VENOUS BLD VENIPUNCTURE: CPT

## 2025-07-26 PROCEDURE — 83036 HEMOGLOBIN GLYCOSYLATED A1C: CPT

## 2025-07-26 PROCEDURE — 80053 COMPREHEN METABOLIC PANEL: CPT

## 2025-08-01 ENCOUNTER — OFFICE VISIT (OUTPATIENT)
Dept: PRIMARY CARE CLINIC | Age: 65
End: 2025-08-01
Payer: MEDICARE

## 2025-08-01 VITALS
OXYGEN SATURATION: 96 % | BODY MASS INDEX: 32.15 KG/M2 | SYSTOLIC BLOOD PRESSURE: 148 MMHG | TEMPERATURE: 97.8 F | HEIGHT: 65 IN | HEART RATE: 86 BPM | WEIGHT: 193 LBS | DIASTOLIC BLOOD PRESSURE: 78 MMHG

## 2025-08-01 DIAGNOSIS — I10 PRIMARY HYPERTENSION: Primary | ICD-10-CM

## 2025-08-01 DIAGNOSIS — F17.200 SMOKER: ICD-10-CM

## 2025-08-01 DIAGNOSIS — L81.9 PIGMENTED SKIN LESION OF UNCERTAIN BEHAVIOR OF TORSO: ICD-10-CM

## 2025-08-01 DIAGNOSIS — R73.01 IMPAIRED FASTING GLUCOSE: ICD-10-CM

## 2025-08-01 DIAGNOSIS — L40.9 PSORIASIS: ICD-10-CM

## 2025-08-01 DIAGNOSIS — E78.00 PURE HYPERCHOLESTEROLEMIA: ICD-10-CM

## 2025-08-01 PROCEDURE — G8427 DOCREV CUR MEDS BY ELIG CLIN: HCPCS | Performed by: FAMILY MEDICINE

## 2025-08-01 PROCEDURE — 1123F ACP DISCUSS/DSCN MKR DOCD: CPT | Performed by: FAMILY MEDICINE

## 2025-08-01 PROCEDURE — 4004F PT TOBACCO SCREEN RCVD TLK: CPT | Performed by: FAMILY MEDICINE

## 2025-08-01 PROCEDURE — 3078F DIAST BP <80 MM HG: CPT | Performed by: FAMILY MEDICINE

## 2025-08-01 PROCEDURE — G8417 CALC BMI ABV UP PARAM F/U: HCPCS | Performed by: FAMILY MEDICINE

## 2025-08-01 PROCEDURE — 99214 OFFICE O/P EST MOD 30 MIN: CPT | Performed by: FAMILY MEDICINE

## 2025-08-01 PROCEDURE — 3077F SYST BP >= 140 MM HG: CPT | Performed by: FAMILY MEDICINE

## 2025-08-01 PROCEDURE — 1159F MED LIST DOCD IN RCRD: CPT | Performed by: FAMILY MEDICINE

## 2025-08-01 PROCEDURE — 3017F COLORECTAL CA SCREEN DOC REV: CPT | Performed by: FAMILY MEDICINE

## 2025-08-01 NOTE — PROGRESS NOTES
25     Mg Jaffe    : 1960 Sex: male   Age: 64 y.o.      Chief Complaint   Patient presents with    Skin Problem     Spot on side that wants looked at.       Prior to Admission medications    Medication Sig Start Date End Date Taking? Authorizing Provider   amLODIPine (NORVASC) 5 MG tablet Take 1 tablet by mouth daily 6/10/25   Ravinder Rader DO   pravastatin (PRAVACHOL) 40 MG tablet Take 1 tablet by mouth daily 6/10/25   Ravinder Rader DO   metFORMIN (GLUCOPHAGE-XR) 500 MG extended release tablet TAKE ONE TABLET BY MOUTH DAILY WITH FOOD 6/10/25   Ravinder Rader DO   lisinopril (PRINIVIL;ZESTRIL) 10 MG tablet Take 1 tablet by mouth daily 25   Ravinder Rader DO   COSENTYX SENSOREADY, 300 MG, 150 MG/ML SOAJ  23   ProviderNagi MD   FLOMAX 0.4 MG capsule 1 hs 23   Ravinder Rader DO   ADBRY 150 MG/ML SOSY  23   Provider, MD Nagi   betamethasone dipropionate 0.05 % cream apply to affected area twice a day 23   ProviderNagi MD   tacrolimus (PROTOPIC) 0.1 % ointment apply to affected area twice a day 23   ProviderNagi MD   diclofenac sodium (VOLTAREN) 1 % GEL Apply 4 g topically 4 times daily 20   Flynn Weinberg DPM   triamcinolone (KENALOG) 0.1 % cream Apply topically 2 times daily. 20   Marcelle Wong PA   desoximetasone (TOPICORT) 0.25 % cream Apply topically 2 times daily. 20   Ravinder Rader DO   sildenafil (REVATIO) 20 MG tablet 2-3 tabs as needed    Provider, MD Nagi          HPI: Patient presents today medical follow-up hypertension hyperlipidemia impaired fasting glucose psoriasis all stable.  Has a pigmented peduncular mass left side of the abdomen groin location and recommended dermatology referral for removal.  Benign appearance but inflamed and irritated and recommending dermatology consultation.          Review of Systems   Constitutional: Negative.    HENT: Negative.